# Patient Record
Sex: FEMALE | Race: WHITE | NOT HISPANIC OR LATINO
[De-identification: names, ages, dates, MRNs, and addresses within clinical notes are randomized per-mention and may not be internally consistent; named-entity substitution may affect disease eponyms.]

---

## 2017-06-13 ENCOUNTER — APPOINTMENT (OUTPATIENT)
Dept: ORTHOPEDIC SURGERY | Facility: CLINIC | Age: 62
End: 2017-06-13

## 2017-06-13 VITALS — WEIGHT: 160 LBS | BODY MASS INDEX: 25.71 KG/M2 | HEIGHT: 66 IN

## 2017-06-13 DIAGNOSIS — Z78.9 OTHER SPECIFIED HEALTH STATUS: ICD-10-CM

## 2017-06-13 DIAGNOSIS — Z80.9 FAMILY HISTORY OF MALIGNANT NEOPLASM, UNSPECIFIED: ICD-10-CM

## 2017-08-17 ENCOUNTER — APPOINTMENT (OUTPATIENT)
Dept: VASCULAR SURGERY | Facility: CLINIC | Age: 62
End: 2017-08-17
Payer: COMMERCIAL

## 2017-08-17 PROCEDURE — 93970 EXTREMITY STUDY: CPT

## 2018-01-14 ENCOUNTER — TRANSCRIPTION ENCOUNTER (OUTPATIENT)
Age: 63
End: 2018-01-14

## 2019-01-21 ENCOUNTER — RX RENEWAL (OUTPATIENT)
Age: 64
End: 2019-01-21

## 2019-01-22 ENCOUNTER — TRANSCRIPTION ENCOUNTER (OUTPATIENT)
Age: 64
End: 2019-01-22

## 2019-02-22 DIAGNOSIS — Z12.31 ENCOUNTER FOR SCREENING MAMMOGRAM FOR MALIGNANT NEOPLASM OF BREAST: ICD-10-CM

## 2019-03-08 ENCOUNTER — RECORD ABSTRACTING (OUTPATIENT)
Age: 64
End: 2019-03-08

## 2019-03-09 DIAGNOSIS — M79.652 PAIN IN LEFT THIGH: ICD-10-CM

## 2019-03-09 DIAGNOSIS — Z12.11 ENCOUNTER FOR SCREENING FOR MALIGNANT NEOPLASM OF COLON: ICD-10-CM

## 2019-03-09 DIAGNOSIS — Z87.01 PERSONAL HISTORY OF PNEUMONIA (RECURRENT): ICD-10-CM

## 2019-03-09 DIAGNOSIS — Z87.19 PERSONAL HISTORY OF OTHER DISEASES OF THE DIGESTIVE SYSTEM: ICD-10-CM

## 2019-03-09 DIAGNOSIS — Z80.1 FAMILY HISTORY OF MALIGNANT NEOPLASM OF TRACHEA, BRONCHUS AND LUNG: ICD-10-CM

## 2019-03-09 DIAGNOSIS — Z87.898 PERSONAL HISTORY OF OTHER SPECIFIED CONDITIONS: ICD-10-CM

## 2019-03-09 DIAGNOSIS — Z82.0 FAMILY HISTORY OF EPILEPSY AND OTHER DISEASES OF THE NERVOUS SYSTEM: ICD-10-CM

## 2019-03-09 DIAGNOSIS — M47.817 SPONDYLOSIS W/OUT MYELOPATHY OR RADICULOPATHY, LUMBOSACRAL REGION: ICD-10-CM

## 2019-03-09 DIAGNOSIS — H90.2 CONDUCTIVE HEARING LOSS, UNSPECIFIED: ICD-10-CM

## 2019-03-09 DIAGNOSIS — R43.9 UNSPECIFIED DISTURBANCES OF SMELL AND TASTE: ICD-10-CM

## 2019-03-09 DIAGNOSIS — M79.651 PAIN IN RIGHT THIGH: ICD-10-CM

## 2019-03-09 DIAGNOSIS — R87.610 ATYPICAL SQUAMOUS CELLS OF UNDETERMINED SIGNIFICANCE ON CYTOLOGIC SMEAR OF CERVIX (ASC-US): ICD-10-CM

## 2019-03-09 DIAGNOSIS — H93.19 TINNITUS, UNSPECIFIED EAR: ICD-10-CM

## 2019-03-09 DIAGNOSIS — M54.41 LUMBAGO WITH SCIATICA, RIGHT SIDE: ICD-10-CM

## 2019-03-09 DIAGNOSIS — Z87.440 PERSONAL HISTORY OF URINARY (TRACT) INFECTIONS: ICD-10-CM

## 2019-03-09 DIAGNOSIS — Z86.39 PERSONAL HISTORY OF OTHER ENDOCRINE, NUTRITIONAL AND METABOLIC DISEASE: ICD-10-CM

## 2019-03-09 DIAGNOSIS — Z86.79 PERSONAL HISTORY OF OTHER DISEASES OF THE CIRCULATORY SYSTEM: ICD-10-CM

## 2019-03-09 DIAGNOSIS — M75.22 BICIPITAL TENDINITIS, LEFT SHOULDER: ICD-10-CM

## 2019-03-09 DIAGNOSIS — R92.2 INCONCLUSIVE MAMMOGRAM: ICD-10-CM

## 2019-03-09 DIAGNOSIS — K76.89 OTHER SPECIFIED DISEASES OF LIVER: ICD-10-CM

## 2019-03-09 DIAGNOSIS — Q85.03 SCHWANNOMATOSIS: ICD-10-CM

## 2019-03-09 LAB — CYTOLOGY CVX/VAG DOC THIN PREP: NORMAL

## 2019-04-15 ENCOUNTER — MESSAGE (OUTPATIENT)
Age: 64
End: 2019-04-15

## 2019-04-15 RX ORDER — DOXYCYCLINE HYCLATE 100 MG/1
100 TABLET ORAL ONCE
Qty: 2 | Refills: 0 | Status: COMPLETED | COMMUNITY
Start: 2019-04-15 | End: 2019-04-16

## 2019-04-22 ENCOUNTER — RESULT REVIEW (OUTPATIENT)
Age: 64
End: 2019-04-22

## 2019-04-25 ENCOUNTER — APPOINTMENT (OUTPATIENT)
Dept: GERIATRICS | Facility: CLINIC | Age: 64
End: 2019-04-25
Payer: COMMERCIAL

## 2019-04-25 VITALS
TEMPERATURE: 98.1 F | BODY MASS INDEX: 26.31 KG/M2 | WEIGHT: 163 LBS | HEART RATE: 103 BPM | DIASTOLIC BLOOD PRESSURE: 67 MMHG | OXYGEN SATURATION: 98 % | SYSTOLIC BLOOD PRESSURE: 112 MMHG

## 2019-04-25 DIAGNOSIS — Z23 ENCOUNTER FOR IMMUNIZATION: ICD-10-CM

## 2019-04-25 PROCEDURE — XXXXX: CPT

## 2019-04-25 RX ORDER — PANTOPRAZOLE 40 MG/1
40 TABLET, DELAYED RELEASE ORAL
Refills: 0 | Status: DISCONTINUED | COMMUNITY
End: 2019-04-25

## 2019-04-25 RX ORDER — ESTRADIOL 10 UG/1
10 INSERT VAGINAL
Refills: 0 | Status: DISCONTINUED | COMMUNITY
End: 2019-04-25

## 2019-04-25 RX ORDER — NITROFURANTOIN MONOHYDRATE/MACROCRYSTALLINE 25; 75 MG/1; MG/1
100 CAPSULE ORAL
Refills: 0 | Status: DISCONTINUED | COMMUNITY
End: 2019-04-25

## 2019-04-25 NOTE — HEALTH RISK ASSESSMENT
[No falls in past year] : Patient reported no falls in the past year [0] : 1) Little interest or pleasure doing things: Not at all (0) [With Significant Other] : lives with significant other [Employed] : employed [] :  [Fully functional (bathing, dressing, toileting, transferring, walking, feeding)] : Fully functional (bathing, dressing, toileting, transferring, walking, feeding) [Fully functional (using the telephone, shopping, preparing meals, housekeeping, doing laundry, using] : Fully functional and needs no help or supervision to perform IADLs (using the telephone, shopping, preparing meals, housekeeping, doing laundry, using transportation, managing medications and managing finances) [Smoke Detector] : smoke detector [Carbon Monoxide Detector] : carbon monoxide detector [Seat Belt] :  uses seat belt [Reports changes in hearing] : Reports no changes in hearing [Reports changes in vision] : Reports no changes in vision [Reports normal functional visual acuity (ie: able to read med bottle)] : Reports poor functional visual acuity.  [Reports changes in dental health] : Reports no changes in dental health [MammogramDate] : 04/19 [MammogramComments] : waiting for results

## 2019-04-25 NOTE — HISTORY OF PRESENT ILLNESS
No [de-identified] : mammo done monday 4/22 with ultrasound\par \par colonoscopy dec 2017-clear, repeat due 7 years\par \par dr hernandez-appt may 2019\par \par feeling fatigues towards end of day\par \par pool 1x/week, walking a lot\par jose thigh pain-due to bursitis-relief with bursitis inj jose

## 2019-04-29 ENCOUNTER — MEDICATION RENEWAL (OUTPATIENT)
Age: 64
End: 2019-04-29

## 2019-05-03 ENCOUNTER — RESULT REVIEW (OUTPATIENT)
Age: 64
End: 2019-05-03

## 2019-05-24 ENCOUNTER — APPOINTMENT (OUTPATIENT)
Dept: OBGYN | Facility: CLINIC | Age: 64
End: 2019-05-24
Payer: COMMERCIAL

## 2019-05-24 VITALS
WEIGHT: 164 LBS | DIASTOLIC BLOOD PRESSURE: 68 MMHG | BODY MASS INDEX: 26.36 KG/M2 | SYSTOLIC BLOOD PRESSURE: 118 MMHG | HEIGHT: 66 IN

## 2019-05-24 PROCEDURE — 99396 PREV VISIT EST AGE 40-64: CPT

## 2019-06-02 NOTE — HISTORY OF PRESENT ILLNESS
[1 Year Ago] : 1 year ago [Healthy Diet] : a healthy diet [Good] : being in good health [Post-Menopause, No Sxs] : post-menopausal, currently without symptoms [Regular Exercise] : regular exercise

## 2019-06-21 LAB
BACTERIA UR CULT: NORMAL
CYTOLOGY CVX/VAG DOC THIN PREP: NORMAL
HPV HIGH+LOW RISK DNA PNL CVX: NOT DETECTED

## 2019-09-17 ENCOUNTER — MEDICATION RENEWAL (OUTPATIENT)
Age: 64
End: 2019-09-17

## 2019-10-30 ENCOUNTER — APPOINTMENT (OUTPATIENT)
Dept: GERIATRICS | Facility: CLINIC | Age: 64
End: 2019-10-30

## 2019-11-01 ENCOUNTER — APPOINTMENT (OUTPATIENT)
Dept: RHEUMATOLOGY | Facility: CLINIC | Age: 64
End: 2019-11-01
Payer: COMMERCIAL

## 2019-11-01 VITALS
HEIGHT: 66 IN | DIASTOLIC BLOOD PRESSURE: 70 MMHG | SYSTOLIC BLOOD PRESSURE: 120 MMHG | WEIGHT: 174 LBS | BODY MASS INDEX: 27.97 KG/M2

## 2019-11-01 PROCEDURE — 36415 COLL VENOUS BLD VENIPUNCTURE: CPT

## 2019-11-01 PROCEDURE — 99214 OFFICE O/P EST MOD 30 MIN: CPT | Mod: 25

## 2019-11-01 RX ORDER — DOXYCYCLINE HYCLATE 100 MG/1
100 CAPSULE ORAL ONCE
Qty: 2 | Refills: 0 | Status: DISCONTINUED | COMMUNITY
End: 2019-11-01

## 2019-11-06 NOTE — ASSESSMENT
[FreeTextEntry1] : There definitely is a component of fibromyalgia.  We have discussed pathophysiology and treatment paradigm at length.  Concern for PMR given pain in shoulder girdle and fatigue.  Will check markers of inflammation.  She did  have similar symptoms in the past, and took a 6 day trial of steroids for presumed PMR, without benefit.  These symptoms resolved on their own at the time long after steroids were discontinued.

## 2019-11-06 NOTE — HISTORY OF PRESENT ILLNESS
[FreeTextEntry1] : 63 yo female here for evaluation of body pain.  She reports that 2 weeks ago, she developed pain in her shoulders and neck (front and back).  Pain is continuous and interferes with her sleep. No preceding trauma or overexertion.\par She feels very tired.\par No joint pain  or swelling in hands or knees.  + stiffness in the morning.\par Last week she woke up with headaches 3 days in a row, which is not typical for her.  She took Tylenol and it went away.  She does not remember what part of her head hurt.\par No blurry vision, jaw pain/fatigue or scalp tenderness.\par She took one Aleve on Wednesday, which did not help.\par The trochanteric bursitis pain that was previously injected has not recurred.\par \par She found 3 ticks on her body this year, the last time was in May.\par no dry eyes or mouth\par no oral ulcers\par No Raynauds\par + GERD\par \par office number at work [phone]

## 2019-11-08 ENCOUNTER — MESSAGE (OUTPATIENT)
Age: 64
End: 2019-11-08

## 2019-11-14 ENCOUNTER — RX RENEWAL (OUTPATIENT)
Age: 64
End: 2019-11-14

## 2019-11-18 LAB
25(OH)D3 SERPL-MCNC: 33.1 NG/ML
ALBUMIN SERPL ELPH-MCNC: 4.3 G/DL
ALP BLD-CCNC: 81 U/L
ALT SERPL-CCNC: 17 U/L
ANA SER IF-ACNC: NEGATIVE
ANION GAP SERPL CALC-SCNC: 14 MMOL/L
AST SERPL-CCNC: 15 U/L
B BURGDOR AB SER-IMP: NEGATIVE
B BURGDOR IGM PATRN SER IB-IMP: NEGATIVE
B BURGDOR18KD IGG SER QL IB: NORMAL
B BURGDOR23KD IGG SER QL IB: NORMAL
B BURGDOR23KD IGM SER QL IB: NORMAL
B BURGDOR28KD IGG SER QL IB: NORMAL
B BURGDOR30KD IGG SER QL IB: NORMAL
B BURGDOR31KD IGG SER QL IB: NORMAL
B BURGDOR39KD IGG SER QL IB: NORMAL
B BURGDOR39KD IGM SER QL IB: NORMAL
B BURGDOR41KD IGG SER QL IB: PRESENT
B BURGDOR41KD IGM SER QL IB: PRESENT
B BURGDOR45KD IGG SER QL IB: NORMAL
B BURGDOR58KD IGG SER QL IB: NORMAL
B BURGDOR66KD IGG SER QL IB: NORMAL
B BURGDOR93KD IGG SER QL IB: NORMAL
BASOPHILS # BLD AUTO: 0.05 K/UL
BASOPHILS NFR BLD AUTO: 0.6 %
BILIRUB SERPL-MCNC: 0.2 MG/DL
BUN SERPL-MCNC: 20 MG/DL
CALCIUM SERPL-MCNC: 9.6 MG/DL
CENTROMERE IGG SER-ACNC: <0.2 CD:130001892
CHLORIDE SERPL-SCNC: 101 MMOL/L
CHROMATIN AB SERPL-ACNC: <0.2 AL
CO2 SERPL-SCNC: 26 MMOL/L
CREAT SERPL-MCNC: 0.88 MG/DL
CRP SERPL-MCNC: 0.91 MG/DL
DSDNA AB SER-ACNC: <12 IU/ML
ENA JO1 AB SER IA-ACNC: <0.2 AL
ENA RNP AB SER IA-ACNC: <0.2 AL
ENA SCL70 IGG SER IA-ACNC: <0.2 AL
ENA SM AB SER IA-ACNC: <0.2 AL
ENA SS-A AB SER IA-ACNC: <0.2 AL
ENA SS-B AB SER IA-ACNC: <0.2 AL
EOSINOPHIL # BLD AUTO: 0.06 K/UL
EOSINOPHIL NFR BLD AUTO: 0.8 %
ERYTHROCYTE [SEDIMENTATION RATE] IN BLOOD BY WESTERGREN METHOD: 49 MM/HR
GLUCOSE SERPL-MCNC: 91 MG/DL
HCT VFR BLD CALC: 41.4 %
HGB BLD-MCNC: 12.8 G/DL
IMM GRANULOCYTES NFR BLD AUTO: 0.1 %
LYMPHOCYTES # BLD AUTO: 2 K/UL
LYMPHOCYTES NFR BLD AUTO: 25.7 %
MAN DIFF?: NORMAL
MCHC RBC-ENTMCNC: 30.7 PG
MCHC RBC-ENTMCNC: 30.9 GM/DL
MCV RBC AUTO: 99.3 FL
MONOCYTES # BLD AUTO: 0.67 K/UL
MONOCYTES NFR BLD AUTO: 8.6 %
NEUTROPHILS # BLD AUTO: 5 K/UL
NEUTROPHILS NFR BLD AUTO: 64.2 %
PLATELET # BLD AUTO: 331 K/UL
POTASSIUM SERPL-SCNC: 3.9 MMOL/L
PROT SERPL-MCNC: 7 G/DL
RBC # BLD: 4.17 M/UL
RBC # FLD: 12.8 %
RNA POLYMERASE III IGG: <10 U
SODIUM SERPL-SCNC: 141 MMOL/L
VIT B12 SERPL-MCNC: 553 PG/ML
WBC # FLD AUTO: 7.79 K/UL

## 2019-11-19 ENCOUNTER — TRANSCRIPTION ENCOUNTER (OUTPATIENT)
Age: 64
End: 2019-11-19

## 2019-11-20 ENCOUNTER — APPOINTMENT (OUTPATIENT)
Dept: PODIATRY | Facility: CLINIC | Age: 64
End: 2019-11-20
Payer: COMMERCIAL

## 2019-11-20 VITALS
HEIGHT: 66 IN | DIASTOLIC BLOOD PRESSURE: 80 MMHG | WEIGHT: 174 LBS | SYSTOLIC BLOOD PRESSURE: 142 MMHG | BODY MASS INDEX: 27.97 KG/M2

## 2019-11-20 DIAGNOSIS — S92.215A NONDISPLACED FRACTURE OF CUBOID BONE OF LEFT FOOT, INITIAL ENCOUNTER FOR CLOSED FRACTURE: ICD-10-CM

## 2019-11-20 PROCEDURE — 28470 CLTX METATARSAL FX WO MNP EA: CPT

## 2019-11-20 PROCEDURE — 99203 OFFICE O/P NEW LOW 30 MIN: CPT | Mod: 25

## 2019-11-20 NOTE — PHYSICAL EXAM
[General Appearance - Alert] : alert [General Appearance - In No Acute Distress] : in no acute distress [Full Pulse] : the pedal pulses are present [Edema] : there was no peripheral edema [Deep Tendon Reflexes (DTR)] : deep tendon reflexes were 2+ and symmetric [Sensation] : the sensory exam was normal to light touch and pinprick [Oriented To Time, Place, And Person] : oriented to person, place, and time [No Focal Deficits] : no focal deficits [Impaired Insight] : insight and judgment were intact [Affect] : the affect was normal [FreeTextEntry1] : Ecchymosis and bruising noted but no break in the skin

## 2019-11-20 NOTE — PROCEDURE
[FreeTextEntry1] : X-rays from urgent care having revealed an avulsion fracture identified at the level of the cuboid no other acute pathology seen.\par had a lengthy discussion with the patient regarding the diagnosis etiology and differential diagnosis as well as treatment options for the presenting problem. Risks alternatives and benefits of treatment ranging from conservative to surgical explained in great detail. I also explained the progression of treatment from conservative to possible surgical treatment options as well as the benefits of each. I do stress conservative treatment if in fact conservative treatment is an option until it no longer provides relief. Over-the-counter products medications padding, and splinting were reviewed as well. All questions asked and answered appropriately to the patient's satisfaction\par \par I had a lengthy discussion with the patient regarding immobilization via a cam walker. After reviewing the benefits as well as the risks of being placed in a cam walker the patient has agreed. Next a new cam walker was removed from its packaging and customized to fit the patient's foot and leg and was instructed on how to use the Cam Walker. They were advised that they need to stay in the cam walker at times except showering and sleeping. I advised the patient that they should not drive with a cam walker. The Cam Walker was placed on the appropriate limb, instructions on how to ambulate with the cam walker were reviewed and the patient showed a knowledge on how to walk, remove, and reapply the cam walker. Complete discharge instructions were given as well as a follow up appointment.\par

## 2019-11-20 NOTE — REVIEW OF SYSTEMS
[As Noted in HPI] : as noted in HPI [Negative] : Cardiovascular [Leg Claudication] : no intermittent leg claudication [Lower Ext Edema] : no lower extremity edema [de-identified] : significant bruising to left foot dorsal as well as medial and lateral

## 2019-11-20 NOTE — HISTORY OF PRESENT ILLNESS
[FreeTextEntry1] : Location: lateral side of left foot4\par Duration:about a week, suffered an injury and a fall, had x-rays at  reveal avulsion fx of left cuboid\par Acute:yes\par Past Tx:[facility] and xrays\par Exacerbated by: WB\par \par

## 2019-11-21 ENCOUNTER — RESULT REVIEW (OUTPATIENT)
Age: 64
End: 2019-11-21

## 2019-12-05 ENCOUNTER — RX RENEWAL (OUTPATIENT)
Age: 64
End: 2019-12-05

## 2019-12-17 ENCOUNTER — APPOINTMENT (OUTPATIENT)
Dept: PODIATRY | Facility: CLINIC | Age: 64
End: 2019-12-17

## 2020-01-01 NOTE — END OF VISIT
[Time Spent: ___ minutes] : I have spent [unfilled] minutes of face to face time with the patient
Hyperbilirubinemia

## 2020-01-10 ENCOUNTER — APPOINTMENT (OUTPATIENT)
Dept: RHEUMATOLOGY | Facility: CLINIC | Age: 65
End: 2020-01-10
Payer: COMMERCIAL

## 2020-01-10 VITALS
BODY MASS INDEX: 28.28 KG/M2 | WEIGHT: 176 LBS | HEIGHT: 66 IN | DIASTOLIC BLOOD PRESSURE: 80 MMHG | SYSTOLIC BLOOD PRESSURE: 120 MMHG

## 2020-01-10 PROCEDURE — 36415 COLL VENOUS BLD VENIPUNCTURE: CPT

## 2020-01-10 PROCEDURE — 99213 OFFICE O/P EST LOW 20 MIN: CPT | Mod: 25

## 2020-01-22 LAB
ALBUMIN SERPL ELPH-MCNC: 4.4 G/DL
ALP BLD-CCNC: 69 U/L
ALT SERPL-CCNC: 18 U/L
ANION GAP SERPL CALC-SCNC: 14 MMOL/L
AST SERPL-CCNC: 16 U/L
BASOPHILS # BLD AUTO: 0.07 K/UL
BASOPHILS NFR BLD AUTO: 0.6 %
BILIRUB SERPL-MCNC: 0.3 MG/DL
BUN SERPL-MCNC: 19 MG/DL
CALCIUM SERPL-MCNC: 9.7 MG/DL
CHLORIDE SERPL-SCNC: 98 MMOL/L
CO2 SERPL-SCNC: 28 MMOL/L
CREAT SERPL-MCNC: 0.89 MG/DL
CRP SERPL-MCNC: 0.52 MG/DL
EOSINOPHIL # BLD AUTO: 0.06 K/UL
EOSINOPHIL NFR BLD AUTO: 0.6 %
ERYTHROCYTE [SEDIMENTATION RATE] IN BLOOD BY WESTERGREN METHOD: 47 MM/HR
GLUCOSE SERPL-MCNC: 96 MG/DL
HCT VFR BLD CALC: 41.9 %
HGB BLD-MCNC: 13.2 G/DL
IMM GRANULOCYTES NFR BLD AUTO: 0.5 %
LYMPHOCYTES # BLD AUTO: 2.8 K/UL
LYMPHOCYTES NFR BLD AUTO: 25.9 %
MAN DIFF?: NORMAL
MCHC RBC-ENTMCNC: 31 PG
MCHC RBC-ENTMCNC: 31.5 GM/DL
MCV RBC AUTO: 98.4 FL
MONOCYTES # BLD AUTO: 0.67 K/UL
MONOCYTES NFR BLD AUTO: 6.2 %
NEUTROPHILS # BLD AUTO: 7.17 K/UL
NEUTROPHILS NFR BLD AUTO: 66.2 %
PLATELET # BLD AUTO: 337 K/UL
POTASSIUM SERPL-SCNC: 4.2 MMOL/L
PROT SERPL-MCNC: 6.9 G/DL
RBC # BLD: 4.26 M/UL
RBC # FLD: 13.4 %
SODIUM SERPL-SCNC: 140 MMOL/L
WBC # FLD AUTO: 10.82 K/UL

## 2020-02-03 ENCOUNTER — APPOINTMENT (OUTPATIENT)
Dept: RHEUMATOLOGY | Facility: CLINIC | Age: 65
End: 2020-02-03

## 2020-02-21 ENCOUNTER — APPOINTMENT (OUTPATIENT)
Dept: RHEUMATOLOGY | Facility: CLINIC | Age: 65
End: 2020-02-21
Payer: COMMERCIAL

## 2020-02-21 PROCEDURE — 99214 OFFICE O/P EST MOD 30 MIN: CPT | Mod: 25

## 2020-02-21 PROCEDURE — 36415 COLL VENOUS BLD VENIPUNCTURE: CPT

## 2020-02-28 NOTE — HISTORY OF PRESENT ILLNESS
[FreeTextEntry1] : She is taking Prednisone 15 mg daily.  She feels well except cramps in her legs from the knee down.  No low back pain.\par She denies headache, blurry vision, scalp and jaw tenderness.\par

## 2020-03-06 LAB
ALBUMIN SERPL ELPH-MCNC: 4.6 G/DL
ALP BLD-CCNC: 65 U/L
ALT SERPL-CCNC: 21 U/L
ANION GAP SERPL CALC-SCNC: 14 MMOL/L
AST SERPL-CCNC: 16 U/L
BASOPHILS # BLD AUTO: 0.06 K/UL
BASOPHILS NFR BLD AUTO: 0.6 %
BILIRUB SERPL-MCNC: 0.3 MG/DL
BUN SERPL-MCNC: 17 MG/DL
CALCIUM SERPL-MCNC: 9.7 MG/DL
CHLORIDE SERPL-SCNC: 100 MMOL/L
CO2 SERPL-SCNC: 28 MMOL/L
CREAT SERPL-MCNC: 0.95 MG/DL
CRP SERPL-MCNC: 0.67 MG/DL
EOSINOPHIL # BLD AUTO: 0.09 K/UL
EOSINOPHIL NFR BLD AUTO: 0.9 %
ERYTHROCYTE [SEDIMENTATION RATE] IN BLOOD BY WESTERGREN METHOD: 31 MM/HR
FERRITIN SERPL-MCNC: 82 NG/ML
GLUCOSE SERPL-MCNC: 92 MG/DL
HCT VFR BLD CALC: 43.4 %
HGB BLD-MCNC: 13.5 G/DL
IMM GRANULOCYTES NFR BLD AUTO: 0.3 %
IRON SATN MFR SERPL: 20 %
IRON SERPL-MCNC: 62 UG/DL
LYMPHOCYTES # BLD AUTO: 3.63 K/UL
LYMPHOCYTES NFR BLD AUTO: 36 %
MAGNESIUM SERPL-MCNC: 2.4 MG/DL
MAN DIFF?: NORMAL
MCHC RBC-ENTMCNC: 31 PG
MCHC RBC-ENTMCNC: 31.1 GM/DL
MCV RBC AUTO: 99.5 FL
MONOCYTES # BLD AUTO: 0.79 K/UL
MONOCYTES NFR BLD AUTO: 7.8 %
NEUTROPHILS # BLD AUTO: 5.49 K/UL
NEUTROPHILS NFR BLD AUTO: 54.4 %
PLATELET # BLD AUTO: 349 K/UL
POTASSIUM SERPL-SCNC: 4.3 MMOL/L
PROT SERPL-MCNC: 7.2 G/DL
RBC # BLD: 4.36 M/UL
RBC # FLD: 13.5 %
SODIUM SERPL-SCNC: 142 MMOL/L
TIBC SERPL-MCNC: 307 UG/DL
UIBC SERPL-MCNC: 245 UG/DL
WBC # FLD AUTO: 10.09 K/UL

## 2020-03-13 ENCOUNTER — APPOINTMENT (OUTPATIENT)
Dept: PULMONOLOGY | Facility: CLINIC | Age: 65
End: 2020-03-13
Payer: COMMERCIAL

## 2020-03-13 ENCOUNTER — LABORATORY RESULT (OUTPATIENT)
Age: 65
End: 2020-03-13

## 2020-03-13 VITALS
HEART RATE: 88 BPM | SYSTOLIC BLOOD PRESSURE: 132 MMHG | DIASTOLIC BLOOD PRESSURE: 70 MMHG | BODY MASS INDEX: 28.28 KG/M2 | HEIGHT: 66 IN | WEIGHT: 176 LBS | OXYGEN SATURATION: 96 % | TEMPERATURE: 98.7 F

## 2020-03-13 DIAGNOSIS — J84.10 PULMONARY FIBROSIS, UNSPECIFIED: ICD-10-CM

## 2020-03-13 DIAGNOSIS — R69 ILLNESS, UNSPECIFIED: ICD-10-CM

## 2020-03-13 PROCEDURE — 99213 OFFICE O/P EST LOW 20 MIN: CPT

## 2020-03-15 PROBLEM — R69 INFLUENZA-LIKE ILLNESS: Status: ACTIVE | Noted: 2020-03-13

## 2020-03-15 PROBLEM — J84.10 PULMONARY FIBROSIS: Status: ACTIVE | Noted: 2019-03-09

## 2020-03-15 NOTE — ASSESSMENT
[FreeTextEntry1] : above was discussed at length with the patient who has an excellent understanding of the issues\par \par

## 2020-03-15 NOTE — REVIEW OF SYSTEMS
[Fever] : fever [Ear Disturbance] : ear disturbance [Menopause] : menopause [Anxiety] : anxiety [Fatigue] : no fatigue [Recent Wt Gain (___ Lbs)] : ~T no recent weight gain [EDS] : no EDS [Chills] : no chills [Poor Appetite] : no poor appetite [Recent Wt Loss (___ Lbs)] : ~T no recent weight loss [Dry Eyes] : no dry eyes [Epistaxis] : no epistaxis [Sore Throat] : no sore throat [Eye Irritation] : no eye irritation [Postnasal Drip] : no postnasal drip [Dry Mouth] : no dry mouth [Sinus Problems] : no sinus problems [Mouth Ulcers] : no mouth ulcers [Poor Dentition] : no poor dentition [Edentulous] : no edentulous [Cough] : no cough [Hemoptysis] : no hemoptysis [Chest Tightness] : no chest tightness [Frequent URIs] : no frequent URIs [Sputum] : no sputum [Dyspnea] : no dyspnea [Pleuritic Pain] : no pleuritic pain [Wheezing] : no wheezing [A.M. Dry Mouth] : no a.m. dry mouth [SOB on Exertion] : no sob on exertion [Chest Discomfort] : no chest discomfort [Claudication] : no claudication [Edema] : no edema [Leg Cramps] : no leg cramps [Orthopnea] : no orthopnea [Palpitations] : no palpitations [Phlebitis] : no phlebitis [PND] : no PND [Syncope] : no syncope [Hay Fever] : no hay fever [Watery Eyes] : no watery eyes [Itchy Eyes] : no itchy eyes [Seasonal Allergies] : no seasonal allergies [Nasal Discharge] : no nasal discharge [Hives] : no hives [Angioedema] : no angioedema [Immunocompromised] : not immunocompromised [GERD] : no gerd [Abdominal Pain] : no abdominal pain [Nausea] : no nausea [Vomiting] : no vomiting [Diarrhea] : no diarrhea [Constipation] : no constipation [Dysphagia] : no dysphagia [Bleeding] : no bleeding [Food Intolerance] : no food intolerance [Hepatic Disease] : no hepatic disease [Nocturia] : no nocturia [Dysuria] : no dysuria [Frequency] : no frequency [Urgency] : no urgency [Arthralgias] : no arthralgias [Myalgias] : no myalgias [Back Pain] : no back pain [Fracture] : no fracture [Trauma/ Injury] : no trauma/ injury [Chronic Pain] : no chronic pain [Raynaud] : no raynaud [Rash] : no rash [Ulcerations] : no ulcerations [Itch] : no itch [Telangiectasias] : no telangiectasias [Anemia] : no anemia [Blood Transfusion] : no blood transfusion [Easy Bruising] : no easy bruising [Clotting Disorder/ Frequent bleeding] : no clotting disorder/ frequent bleeding [History of Iron Deficiency] : no history of iron deficiency [Headache] : no headache [Focal Weakness] : no focal weakness [Seizures] : no seizures [Head Injury] : no head injury [Dizziness] : no dizziness [Numbness] : no numbness [Memory Loss] : no memory loss [Involuntary Movements] : no involuntary movements [Paralysis] : no paralysis [Confusion] : no confusion [Tremor] : no tremor [Depression] : no depression [Panic Attacks] : no panic attacks [Diabetes] : no diabetes [Thyroid Problem] : no thyroid problem [Obesity] : no obesity

## 2020-03-15 NOTE — REASON FOR VISIT
[Acute] : an acute visit [Abnormal CXR/ Chest CT] : an abnormal CXR/ chest CT [Cough] : cough [TextBox_44] : sweats

## 2020-03-15 NOTE — HISTORY OF PRESENT ILLNESS
[Former] : former [Never] : never [TextBox_4] : 63 yo WW former smoker 1PPD 12 pack yr hx with + DENNY PMR FM GERD Mild CT-ILD as evidenced by mild subpleural thickening seen on recent CT 11/2019 w/o change c/w 11/17 + RUL 7mm nodule s/p CT Bx'd  3 years ago c/w min int fibrosis and a gastric Schwanomma S100 '+' resected in 2/11 who has been c/o cough and waking up early AM with sweats for the last 2 days. No fevers or chills but temp has been 99.2-99.8 all day today and is now asking if she should be tested for COV-2. No SOB CP pressure and otherwise feels fine.

## 2020-03-15 NOTE — PHYSICAL EXAM
[No Acute Distress] : no acute distress [Normal Oropharynx] : normal oropharynx [Erythema] : erythema [Nasal congestion] : nasal congestion [Normal Appearance] : normal appearance [No Neck Mass] : no neck mass [Normal Rate/Rhythm] : normal rate/rhythm [Normal S1, S2] : normal s1, s2 [No Murmurs] : no murmurs [No Resp Distress] : no resp distress [No Acc Muscle Use] : no acc muscle use [Normal Rhythm and Effort] : normal rhythm and effort [No Abnormalities] : no abnormalities [Benign] : benign [Normal Gait] : normal gait [No Clubbing] : no clubbing [No Cyanosis] : no cyanosis [No Edema] : no edema [FROM] : FROM [Normal Color/ Pigmentation] : normal color/ pigmentation [No Focal Deficits] : no focal deficits [Oriented x3] : oriented x3 [Normal Affect] : normal affect

## 2020-03-15 NOTE — DISCUSSION/SUMMARY
[FreeTextEntry1] : All images and report reviewed by me in the office \par CT 11/21/19 mild subleural fibroiss and stable RUL nodule\par CT BX- results noted \par PFT 4/20/2017 FEV1 2.77 (103% predicted) FEV1/FVC 74 no change postBD % % DLCO 81% consistent with mild hyperinflation and a borderline DLCO

## 2020-03-18 ENCOUNTER — NON-APPOINTMENT (OUTPATIENT)
Age: 65
End: 2020-03-18

## 2020-04-26 ENCOUNTER — MESSAGE (OUTPATIENT)
Age: 65
End: 2020-04-26

## 2020-04-27 ENCOUNTER — RESULT REVIEW (OUTPATIENT)
Age: 65
End: 2020-04-27

## 2020-04-29 ENCOUNTER — APPOINTMENT (OUTPATIENT)
Dept: ORTHOPEDIC SURGERY | Facility: CLINIC | Age: 65
End: 2020-04-29
Payer: COMMERCIAL

## 2020-04-29 VITALS — WEIGHT: 176 LBS | BODY MASS INDEX: 28.28 KG/M2 | HEIGHT: 66 IN

## 2020-04-29 PROCEDURE — 73030 X-RAY EXAM OF SHOULDER: CPT | Mod: RT

## 2020-04-29 PROCEDURE — 99213 OFFICE O/P EST LOW 20 MIN: CPT | Mod: 25

## 2020-04-29 PROCEDURE — 20610 DRAIN/INJ JOINT/BURSA W/O US: CPT | Mod: RT

## 2020-04-29 NOTE — REVIEW OF SYSTEMS
[Joint Pain] : joint pain [Joint Stiffness] : joint stiffness [Sleep Disturbances] : ~T sleep disturbances [Muscle Weakness] : muscle weakness [Negative] : Heme/Lymph

## 2020-04-29 NOTE — PHYSICAL EXAM
[UE] : Sensory: Intact in bilateral upper extremities [Normal] : Gait: normal [Bicep] : biceps 2+ and symmetric bilaterally [Rad] : radial 2+ and symmetric bilaterally [FreeTextEntry2] : Pain on palpation\par right no\par left no\par ROM\par right 140\par left 160\par Strength\par right 4/5\par left 5/5\par Skin intact [Shoulder Instab Anterior Apprehension (Crank) Test Left] : negative Apprehension test

## 2020-04-29 NOTE — HISTORY OF PRESENT ILLNESS
[de-identified] : Patient reports moderate to severe anterior and lateral right shoulder pain for 5 months.  Patient fell on right side when walking dogs in November.  Patient had injection for right shoulder pain 3 years ago with relief.  Pain at night and clicking with motion.   PT has no effect on pain.  NSAID use helps a little with pain.

## 2020-05-05 ENCOUNTER — APPOINTMENT (OUTPATIENT)
Age: 65
End: 2020-05-05

## 2020-05-05 LAB
SARS-COV-2 IGG SERPL IA-ACNC: 0.1 INDEX
SARS-COV-2 IGG SERPL QL IA: NEGATIVE

## 2020-05-06 ENCOUNTER — APPOINTMENT (OUTPATIENT)
Dept: RHEUMATOLOGY | Facility: CLINIC | Age: 65
End: 2020-05-06
Payer: COMMERCIAL

## 2020-05-06 VITALS
SYSTOLIC BLOOD PRESSURE: 110 MMHG | WEIGHT: 176 LBS | BODY MASS INDEX: 28.28 KG/M2 | DIASTOLIC BLOOD PRESSURE: 80 MMHG | HEIGHT: 66 IN

## 2020-05-06 PROCEDURE — 20610 DRAIN/INJ JOINT/BURSA W/O US: CPT

## 2020-05-06 PROCEDURE — 99213 OFFICE O/P EST LOW 20 MIN: CPT | Mod: 25

## 2020-05-06 RX ORDER — METHYLPRED ACET/NACL,ISO-OS/PF 80 MG/ML
80 VIAL (ML) INJECTION
Qty: 1 | Refills: 0 | Status: COMPLETED | OUTPATIENT
Start: 2020-05-06

## 2020-05-06 RX ADMIN — METHYLPREDNISOLONE ACETATE 1 MG/ML: 80 INJECTION, SUSPENSION INTRA-ARTICULAR; INTRALESIONAL; INTRAMUSCULAR; SOFT TISSUE at 00:00

## 2020-05-08 NOTE — HISTORY OF PRESENT ILLNESS
[FreeTextEntry1] : She is on Prednisone 10 mg daily since mid March.  She denies headache, blurry vision, scalp and jaw tenderness.\par She is gaining weight.\par last injection of B/L trochanteric bursitis 3/14/18\par She had right shoulder injected by Dr. Casillas 4/29/20.  The previous shoulder injection was 3 years ago.

## 2020-05-08 NOTE — PROCEDURE
[FreeTextEntry1] : TROCHANTERIC BURSITIS:\par Patient placed in lateral recumbent position. Point of maximal tenderness identified. Area cleaned with alcohol.  L Trochanteric bursa injected with 1cc of Lidocaine 1% and DepoMedrol 80mg. Patient tolerated the procedure well without complications. Post-procedure instructions given.\par \par

## 2020-05-29 ENCOUNTER — APPOINTMENT (OUTPATIENT)
Dept: OBGYN | Facility: CLINIC | Age: 65
End: 2020-05-29
Payer: COMMERCIAL

## 2020-05-29 VITALS
BODY MASS INDEX: 28.93 KG/M2 | HEIGHT: 66 IN | DIASTOLIC BLOOD PRESSURE: 80 MMHG | SYSTOLIC BLOOD PRESSURE: 112 MMHG | WEIGHT: 180 LBS

## 2020-05-29 DIAGNOSIS — Z01.419 ENCOUNTER FOR GYNECOLOGICAL EXAMINATION (GENERAL) (ROUTINE) W/OUT ABNORMAL FINDINGS: ICD-10-CM

## 2020-05-29 PROCEDURE — 99396 PREV VISIT EST AGE 40-64: CPT

## 2020-06-03 ENCOUNTER — APPOINTMENT (OUTPATIENT)
Dept: RHEUMATOLOGY | Facility: CLINIC | Age: 65
End: 2020-06-03
Payer: COMMERCIAL

## 2020-06-03 PROCEDURE — 99213 OFFICE O/P EST LOW 20 MIN: CPT | Mod: 95

## 2020-06-04 NOTE — HISTORY OF PRESENT ILLNESS
[Home] : at home, [unfilled] , at the time of the visit. [Verbal consent obtained from patient] : the patient, [unfilled] [Medical Office: ([facility])___] : at the medical office located in  [FreeTextEntry1] : She is on Prednisone 8 mg for a week now.  No recurrence of symptoms as she lowers the dose.\par She had an injection of her troch bursa at last visit, which helped.\par No new symptoms.\par She denies headache, blurry vision, scalp and jaw tenderness.\par No other joint pain.\par No rashes.

## 2020-06-04 NOTE — ASSESSMENT
[FreeTextEntry1] : cont Prednisone 8 mg daily for 2 weeks, then decrease to 7 mg for 3 weeks, then f/u

## 2020-06-25 ENCOUNTER — NON-APPOINTMENT (OUTPATIENT)
Age: 65
End: 2020-06-25

## 2020-06-25 ENCOUNTER — APPOINTMENT (OUTPATIENT)
Dept: GERIATRICS | Facility: CLINIC | Age: 65
End: 2020-06-25
Payer: COMMERCIAL

## 2020-06-25 VITALS
SYSTOLIC BLOOD PRESSURE: 140 MMHG | OXYGEN SATURATION: 99 % | TEMPERATURE: 97.1 F | WEIGHT: 180 LBS | HEART RATE: 76 BPM | BODY MASS INDEX: 29.05 KG/M2 | DIASTOLIC BLOOD PRESSURE: 80 MMHG

## 2020-06-25 DIAGNOSIS — Z87.891 PERSONAL HISTORY OF NICOTINE DEPENDENCE: ICD-10-CM

## 2020-06-25 DIAGNOSIS — Z78.9 OTHER SPECIFIED HEALTH STATUS: ICD-10-CM

## 2020-06-25 PROCEDURE — 93010 ELECTROCARDIOGRAM REPORT: CPT

## 2020-06-25 PROCEDURE — 99214 OFFICE O/P EST MOD 30 MIN: CPT | Mod: 25

## 2020-06-25 PROCEDURE — 99396 PREV VISIT EST AGE 40-64: CPT | Mod: 25

## 2020-06-28 VITALS — SYSTOLIC BLOOD PRESSURE: 128 MMHG | DIASTOLIC BLOOD PRESSURE: 68 MMHG

## 2020-06-28 NOTE — HISTORY OF PRESENT ILLNESS
[de-identified] : d/w PMR around dec  started on prednisine 15 mg, has had decreasing doses and now on 7mg , doing "ok" with decreasing doses, f/u with Dr stapleton, decreasing doeses 1mg about every 3 weeks, \par \par leg cramps at night, taking magnesium with min results, trying to increase fluid intakds, foot and calf, \par \par pap smear May 2020-neg\par \par due for colonoscopy -2024\par \par ammo-april 2020-neg f/u one year\par \par Ct scan Nov 2019-neg for change in RUL nodule, Liver stable

## 2020-06-28 NOTE — REVIEW OF SYSTEMS
[Joint Pain] : joint pain [Muscle Pain] : muscle pain [Negative] : Heme/Lymph [FreeTextEntry9] : improved on steroids, cont with leg cramps at night

## 2020-06-28 NOTE — PHYSICAL EXAM
[Declined Breast Exam] : declined breast exam  [Declined Rectal Exam] : declined rectal exam [Normal] : affect was normal and insight and judgment were intact [de-identified] : slight decreased hearing right>left

## 2020-07-22 ENCOUNTER — RESULT REVIEW (OUTPATIENT)
Age: 65
End: 2020-07-22

## 2020-07-28 DIAGNOSIS — N39.0 URINARY TRACT INFECTION, SITE NOT SPECIFIED: ICD-10-CM

## 2020-08-11 ENCOUNTER — APPOINTMENT (OUTPATIENT)
Dept: RHEUMATOLOGY | Facility: CLINIC | Age: 65
End: 2020-08-11
Payer: COMMERCIAL

## 2020-08-11 VITALS
HEIGHT: 66 IN | SYSTOLIC BLOOD PRESSURE: 122 MMHG | DIASTOLIC BLOOD PRESSURE: 82 MMHG | WEIGHT: 178 LBS | BODY MASS INDEX: 28.61 KG/M2

## 2020-08-11 PROCEDURE — 99214 OFFICE O/P EST MOD 30 MIN: CPT | Mod: 25

## 2020-08-11 PROCEDURE — 20610 DRAIN/INJ JOINT/BURSA W/O US: CPT

## 2020-08-11 RX ORDER — NITROFURANTOIN (MONOHYDRATE/MACROCRYSTALS) 25; 75 MG/1; MG/1
100 CAPSULE ORAL TWICE DAILY
Qty: 10 | Refills: 0 | Status: DISCONTINUED | COMMUNITY
Start: 2020-07-28 | End: 2020-08-11

## 2020-08-11 RX ADMIN — METHYLPREDNISOLONE ACETATE 1 MG/ML: 40 INJECTION, SUSPENSION INTRA-ARTICULAR; INTRALESIONAL; INTRAMUSCULAR; SOFT TISSUE at 00:00

## 2020-08-12 RX ORDER — METHYLPRED ACET/NACL,ISO-OS/PF 40 MG/ML
40 VIAL (ML) INJECTION
Qty: 1 | Refills: 0 | Status: COMPLETED | OUTPATIENT
Start: 2020-08-11

## 2020-08-13 LAB
CYTOLOGY CVX/VAG DOC THIN PREP: NORMAL
HPV HIGH+LOW RISK DNA PNL CVX: NOT DETECTED

## 2020-08-13 NOTE — PROCEDURE
[FreeTextEntry1] : TROCHANTERIC BURSITIS:\par Patient placed in lateral recumbent position. Point of maximal tenderness identified. Area cleaned with alcohol.  L Trochanteric bursa injected with 1cc of Lidocaine 1% and DepoMedrol 40mg. Patient tolerated the procedure well without complications. Post-procedure instructions given.\par \par

## 2020-08-13 NOTE — HISTORY OF PRESENT ILLNESS
[FreeTextEntry1] : Prednisone is down to 5 mg since Aug 1st.  No flare of symptoms.\par April 29 she had a right shoulder injection by Dr. Casillas, with minimal benefit.  It hurts and clicks a lot.\par She gets frontal headaches.\par No scalp tenderness or jaw pain/fatigue.\par When she walks her dog she gets pain on the side of the her left thigh and into her groin.\par She gets leg cramps.  Dr. Prajapati recommended Tonic water.  The three nights that she didn’t drink it, the cramps came back.\par Last bursitis injection to hip was 5/6/20, which helped, but it wore off a few weeks ago.

## 2020-08-14 ENCOUNTER — RESULT REVIEW (OUTPATIENT)
Age: 65
End: 2020-08-14

## 2020-10-15 ENCOUNTER — APPOINTMENT (OUTPATIENT)
Dept: RHEUMATOLOGY | Facility: CLINIC | Age: 65
End: 2020-10-15
Payer: COMMERCIAL

## 2020-10-15 VITALS
HEIGHT: 66 IN | BODY MASS INDEX: 29.57 KG/M2 | WEIGHT: 184 LBS | DIASTOLIC BLOOD PRESSURE: 80 MMHG | SYSTOLIC BLOOD PRESSURE: 126 MMHG

## 2020-10-15 DIAGNOSIS — G89.29 OTHER CHRONIC PAIN: ICD-10-CM

## 2020-10-15 PROCEDURE — 99214 OFFICE O/P EST MOD 30 MIN: CPT

## 2020-10-18 NOTE — HISTORY OF PRESENT ILLNESS
[FreeTextEntry1] : She started getting headaches at the top of her head yesterday for 12 hours.  She woke up with it and she took Tylenol and it got better, but now it is starting to come back.  She feels like her head is in a fog.\par No blurry vision.  No scalp tenderness.  No jaw pain.\par She had "silver aura" last week in her right eye and lasted 25 minutes.  She didn't get a migraine afterwards.\par She saw Dr. Billingsley who thought her arthritis was worse than the tear and recommends shoulder replacement.  He does not do shoulder replacement, so he referred her to Dr. Franck Bran at [facility].\par Dr. Billingsley did give her an injection.  It took 2 weeks for it to kick in, and it took the edge off for a bit, but now the pain is back again and severe.\par She has pain on the side of her left side of her thigh and front of thigh.\par When she is walking her dog, she feels like she needs to sit down.\par If she lifts her leg or moves it, she has severe pain.  No pain at rest.  Standing for prolonged periods makes the pain come on.\par One day she did 22,000 steps and had no pain.

## 2020-10-18 NOTE — ASSESSMENT
[FreeTextEntry1] : She takes Aleve but her gums are bleeding and it irritates her stomach.  Recommend 2 tabs of Tylenol ES.

## 2020-10-23 ENCOUNTER — RESULT REVIEW (OUTPATIENT)
Age: 65
End: 2020-10-23

## 2020-11-05 ENCOUNTER — APPOINTMENT (OUTPATIENT)
Dept: PAIN MANAGEMENT | Facility: HOSPITAL | Age: 65
End: 2020-11-05
Payer: COMMERCIAL

## 2020-11-05 VITALS
SYSTOLIC BLOOD PRESSURE: 140 MMHG | TEMPERATURE: 97.2 F | DIASTOLIC BLOOD PRESSURE: 82 MMHG | HEIGHT: 66 IN | BODY MASS INDEX: 29.57 KG/M2 | WEIGHT: 184 LBS

## 2020-11-05 PROCEDURE — 99072 ADDL SUPL MATRL&STAF TM PHE: CPT

## 2020-11-05 PROCEDURE — 99214 OFFICE O/P EST MOD 30 MIN: CPT

## 2020-11-05 NOTE — ASSESSMENT
[FreeTextEntry1] : 65 yof presents w/ long standing low back pain that radiates down the bilateral lower extremities. Pain is worse w/ walking. Improves when sitting. Neurogenic claudication symptoms are worsening. Given her failure to improve w/ all other conservative measures and worsening radiculopathy recommend MRI lumbar spine. Patient will return to review imaging and plan for potential intervention, likely JOSE ALBERTO. \par

## 2020-11-05 NOTE — HISTORY OF PRESENT ILLNESS
[9] : 2. What number best describes how, during the past week, pain has interfered with your enjoyment of life? 9/10 pain [FreeTextEntry1] : 65 yof presents w/ long standing low back pain that radiates down the bilateral lower extremities. Pain is worse w/ walking. Improves when sitting. Has cramping in the legs at night which helps w/ tonic water and magnesium. Has a feeling of weakness and heaviness in the legs. \par \par Interventions:\par Left L4-L5 and L5-S1 TFESI 2018 [FreeTextEntry2] : 27

## 2020-11-07 ENCOUNTER — RESULT REVIEW (OUTPATIENT)
Age: 65
End: 2020-11-07

## 2020-11-09 ENCOUNTER — RESULT REVIEW (OUTPATIENT)
Age: 65
End: 2020-11-09

## 2020-11-10 ENCOUNTER — APPOINTMENT (OUTPATIENT)
Dept: PAIN MANAGEMENT | Facility: HOSPITAL | Age: 65
End: 2020-11-10

## 2020-11-10 ENCOUNTER — RESULT REVIEW (OUTPATIENT)
Age: 65
End: 2020-11-10

## 2020-12-01 ENCOUNTER — APPOINTMENT (OUTPATIENT)
Dept: RHEUMATOLOGY | Facility: CLINIC | Age: 65
End: 2020-12-01
Payer: COMMERCIAL

## 2020-12-01 VITALS
SYSTOLIC BLOOD PRESSURE: 130 MMHG | DIASTOLIC BLOOD PRESSURE: 82 MMHG | BODY MASS INDEX: 28.28 KG/M2 | HEIGHT: 66 IN | WEIGHT: 176 LBS

## 2020-12-01 DIAGNOSIS — M19.011 PRIMARY OSTEOARTHRITIS, RIGHT SHOULDER: ICD-10-CM

## 2020-12-01 DIAGNOSIS — M54.5 LOW BACK PAIN: ICD-10-CM

## 2020-12-01 DIAGNOSIS — M25.511 PAIN IN RIGHT SHOULDER: ICD-10-CM

## 2020-12-01 DIAGNOSIS — M62.89 OTHER SPECIFIED DISORDERS OF MUSCLE: ICD-10-CM

## 2020-12-01 PROCEDURE — 99072 ADDL SUPL MATRL&STAF TM PHE: CPT

## 2020-12-01 PROCEDURE — 99214 OFFICE O/P EST MOD 30 MIN: CPT | Mod: 25

## 2020-12-01 PROCEDURE — 36415 COLL VENOUS BLD VENIPUNCTURE: CPT

## 2020-12-01 RX ORDER — PREDNISONE 1 MG/1
1 TABLET ORAL
Qty: 360 | Refills: 3 | Status: COMPLETED | COMMUNITY
Start: 2020-05-06 | End: 2020-11-24

## 2020-12-01 RX ORDER — PREDNISONE 10 MG/1
10 TABLET ORAL
Refills: 0 | Status: COMPLETED | COMMUNITY
Start: 2020-01-10 | End: 2020-11-24

## 2020-12-02 ENCOUNTER — LABORATORY RESULT (OUTPATIENT)
Age: 65
End: 2020-12-02

## 2020-12-04 ENCOUNTER — RESULT REVIEW (OUTPATIENT)
Age: 65
End: 2020-12-04

## 2020-12-04 PROBLEM — M62.89 TENSOR FASCIA LATA SYNDROME: Status: ACTIVE | Noted: 2020-12-04

## 2020-12-04 PROBLEM — M25.511 RIGHT SHOULDER PAIN: Status: ACTIVE | Noted: 2017-06-13

## 2020-12-04 PROBLEM — M54.5 LEFT LOW BACK PAIN: Status: ACTIVE | Noted: 2020-10-15

## 2020-12-04 PROBLEM — M19.011 PRIMARY OSTEOARTHRITIS OF RIGHT SHOULDER: Status: ACTIVE | Noted: 2017-06-13

## 2020-12-04 NOTE — HISTORY OF PRESENT ILLNESS
[FreeTextEntry1] : She had a spine MRI, then saw Dr. Rodríguez and had an epidural, which did not help.  The pain in the leg is worse than the pain in her back.  Dr. Rodríguez proposed injecting another area that showed bad arthritic changes.\par \par She topped Prednisone on Friday.  Started feeling bad weeks ago.  Back pain is constant.  Pain is radiating down the left side of her left leg and is becoming more frequent.  Sitting down alleviates the pain.\par \par She still has constant shoulder pain.  She needs right shoulder surgery by Dr. Whiteside from [facility] but will have the surgery at [facility].  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  No blurry vision.  No jaw or scalp pain.

## 2020-12-10 ENCOUNTER — APPOINTMENT (OUTPATIENT)
Dept: PULMONOLOGY | Facility: CLINIC | Age: 65
End: 2020-12-10
Payer: COMMERCIAL

## 2020-12-10 PROCEDURE — 99214 OFFICE O/P EST MOD 30 MIN: CPT | Mod: 95

## 2020-12-10 NOTE — REASON FOR VISIT
[Abnormal CXR/ Chest CT] : an abnormal CXR/ chest CT [Acute] : an acute visit [Cough] : cough [TextBox_44] : sweats

## 2020-12-10 NOTE — HISTORY OF PRESENT ILLNESS
[Other Location: e.g. School (Enter Location, City,State)___] : at [unfilled], at the time of the visit. [Medical Office: ([facility])___] : at the medical office located in  [Spouse] : spouse [Verbal consent obtained from patient] : the patient, [unfilled] [Former] : former [Never] : never [TextBox_4] : 63 yo [place] former smoker 1PPD 12 pack yr hx with + DENNY PMR FM GERD Mild CT-ILD as evidenced by mild subpleural thickening seen on recent CT 11/2019 w/o change c/w 11/17 + RUL 7mm nodule s/p CT Bx'd  3 years ago c/w min int fibrosis and a gastric Schwanomma S100 '+' resected in 2/11 who has been c/o cough and waking up early AM with sweats for the last 2 days. No fevers or chills but temp has been 99.2-99.8 all day today and is now asking if she should be tested for COV-2. No SOB CP pressure and otherwise feels fine.

## 2020-12-10 NOTE — DISCUSSION/SUMMARY
[FreeTextEntry1] : All images and report reviewed by me in the office \par CT 11/21/19 mild subleural fibroiss and stable RUL nodule\par CT BX- results noted \par PFT 4/20/2017 FEV1 2.77 (103% predicted) FEV1/FVC 74 no change postBD % % DLCO 81% consistent with mild hyperinflation and a borderline DLCO\par Chest CT 12/4/2020 I disagree there has been interval growth in the RUL nodule within the past year.

## 2020-12-10 NOTE — ASSESSMENT
[FreeTextEntry1] : above was discussed at length with the patient who has an excellent understanding of the issues. 32min spent with 18min coordinating care\par \par

## 2020-12-13 ENCOUNTER — OUTPATIENT (OUTPATIENT)
Dept: OUTPATIENT SERVICES | Facility: HOSPITAL | Age: 65
LOS: 1 days | End: 2020-12-13
Payer: COMMERCIAL

## 2020-12-13 PROCEDURE — A9552: CPT

## 2020-12-13 PROCEDURE — 78815 PET IMAGE W/CT SKULL-THIGH: CPT

## 2020-12-13 PROCEDURE — 82962 GLUCOSE BLOOD TEST: CPT

## 2020-12-13 PROCEDURE — 78815 PET IMAGE W/CT SKULL-THIGH: CPT | Mod: 26

## 2020-12-23 PROBLEM — Z01.419 ENCOUNTER FOR ANNUAL ROUTINE GYNECOLOGICAL EXAMINATION: Status: RESOLVED | Noted: 2019-05-24 | Resolved: 2020-12-23

## 2020-12-23 PROBLEM — N39.0 ACUTE LOWER UTI: Status: RESOLVED | Noted: 2020-07-28 | Resolved: 2020-12-23

## 2021-01-07 ENCOUNTER — APPOINTMENT (OUTPATIENT)
Dept: THORACIC SURGERY | Facility: CLINIC | Age: 66
End: 2021-01-07
Payer: COMMERCIAL

## 2021-01-07 ENCOUNTER — APPOINTMENT (OUTPATIENT)
Dept: THORACIC SURGERY | Facility: CLINIC | Age: 66
End: 2021-01-07

## 2021-01-08 ENCOUNTER — APPOINTMENT (OUTPATIENT)
Dept: THORACIC SURGERY | Facility: CLINIC | Age: 66
End: 2021-01-08
Payer: COMMERCIAL

## 2021-01-08 PROCEDURE — 99204 OFFICE O/P NEW MOD 45 MIN: CPT

## 2021-01-08 PROCEDURE — 99072 ADDL SUPL MATRL&STAF TM PHE: CPT

## 2021-01-11 ENCOUNTER — NON-APPOINTMENT (OUTPATIENT)
Age: 66
End: 2021-01-11

## 2021-01-14 ENCOUNTER — RESULT REVIEW (OUTPATIENT)
Age: 66
End: 2021-01-14

## 2021-01-15 ENCOUNTER — APPOINTMENT (OUTPATIENT)
Dept: CARDIOLOGY | Facility: CLINIC | Age: 66
End: 2021-01-15
Payer: COMMERCIAL

## 2021-01-15 ENCOUNTER — NON-APPOINTMENT (OUTPATIENT)
Age: 66
End: 2021-01-15

## 2021-01-15 VITALS
WEIGHT: 176 LBS | HEIGHT: 66 IN | SYSTOLIC BLOOD PRESSURE: 170 MMHG | BODY MASS INDEX: 28.28 KG/M2 | DIASTOLIC BLOOD PRESSURE: 80 MMHG

## 2021-01-15 PROCEDURE — 93242 EXT ECG>48HR<7D RECORDING: CPT

## 2021-01-15 PROCEDURE — 99072 ADDL SUPL MATRL&STAF TM PHE: CPT

## 2021-01-15 PROCEDURE — 99204 OFFICE O/P NEW MOD 45 MIN: CPT

## 2021-01-15 PROCEDURE — 93000 ELECTROCARDIOGRAM COMPLETE: CPT | Mod: 59

## 2021-01-15 NOTE — ASSESSMENT
[FreeTextEntry1] : Referred by Dr. Paulson\par \par EKG January 2021 normal sinus rhythm with sinus variation\par \par 65-year-old ex-smoker with pulmonary nodule in for preop evaluation patient with palpitations as well as chest discomfort would recommend 2-D echocardiogram and treadmill stress test as well as outpatient cardiac monitor to assess sinus arrhythmia and PVCs burden

## 2021-01-15 NOTE — PHYSICAL EXAM
[General Appearance - Well Developed] : well developed [Normal Appearance] : normal appearance [Well Groomed] : well groomed [General Appearance - Well Nourished] : well nourished [No Deformities] : no deformities [General Appearance - In No Acute Distress] : no acute distress [Normal Conjunctiva] : the conjunctiva exhibited no abnormalities [Eyelids - No Xanthelasma] : the eyelids demonstrated no xanthelasmas [Normal Oral Mucosa] : normal oral mucosa [No Oral Pallor] : no oral pallor [No Oral Cyanosis] : no oral cyanosis [Normal Jugular Venous A Waves Present] : normal jugular venous A waves present [Normal Jugular Venous V Waves Present] : normal jugular venous V waves present [No Jugular Venous Rosa A Waves] : no jugular venous rosa A waves [Heart Rate And Rhythm] : heart rate and rhythm were normal [Heart Sounds] : normal S1 and S2 [Murmurs] : no murmurs present [Respiration, Rhythm And Depth] : normal respiratory rhythm and effort [Exaggerated Use Of Accessory Muscles For Inspiration] : no accessory muscle use [Abdomen Soft] : soft [Auscultation Breath Sounds / Voice Sounds] : lungs were clear to auscultation bilaterally [Abdomen Tenderness] : non-tender [Abdomen Mass (___ Cm)] : no abdominal mass palpated [Abnormal Walk] : normal gait [Gait - Sufficient For Exercise Testing] : the gait was sufficient for exercise testing [Nail Clubbing] : no clubbing of the fingernails [Cyanosis, Localized] : no localized cyanosis [Petechial Hemorrhages (___cm)] : no petechial hemorrhages [Skin Color & Pigmentation] : normal skin color and pigmentation [] : no rash [No Venous Stasis] : no venous stasis [Skin Lesions] : no skin lesions [No Skin Ulcers] : no skin ulcer [No Xanthoma] : no  xanthoma was observed [Oriented To Time, Place, And Person] : oriented to person, place, and time [Affect] : the affect was normal [Mood] : the mood was normal [No Anxiety] : not feeling anxious

## 2021-01-15 NOTE — HISTORY OF PRESENT ILLNESS
[FreeTextEntry1] : 65-year-old female with past medical history of intermittent hypertension and hyperlipidemia, ex-smoker and pulmonary nodule that is increasing in size is here for preop evaluation for lobectomy. Patient complains of episodes of chest pain radiating down to her left arm and numbness in her arm pit. She had a previous cardiac workup that started in 2010 which revealed sinus arrhythmia with PVCs. A 2-D echocardiogram was normal and patient also had a exercise stress test which was unremarkable at that time

## 2021-01-20 PROCEDURE — 99072 ADDL SUPL MATRL&STAF TM PHE: CPT

## 2021-01-20 PROCEDURE — 93244 EXT ECG>48HR<7D REV&INTERPJ: CPT

## 2021-01-22 ENCOUNTER — LABORATORY RESULT (OUTPATIENT)
Age: 66
End: 2021-01-22

## 2021-01-24 ENCOUNTER — RESULT REVIEW (OUTPATIENT)
Age: 66
End: 2021-01-24

## 2021-01-25 ENCOUNTER — RESULT REVIEW (OUTPATIENT)
Age: 66
End: 2021-01-25

## 2021-01-27 ENCOUNTER — RESULT CHARGE (OUTPATIENT)
Age: 66
End: 2021-01-27

## 2021-01-27 DIAGNOSIS — T50.Z95A ADVERSE EFFECT OF OTHER VACCINES AND BIOLOGICAL SUBSTANCES, INITIAL ENCOUNTER: ICD-10-CM

## 2021-01-28 ENCOUNTER — APPOINTMENT (OUTPATIENT)
Dept: GERIATRICS | Facility: CLINIC | Age: 66
End: 2021-01-28
Payer: COMMERCIAL

## 2021-01-28 VITALS
TEMPERATURE: 98.3 F | WEIGHT: 171 LBS | OXYGEN SATURATION: 97 % | HEART RATE: 83 BPM | BODY MASS INDEX: 27.6 KG/M2 | SYSTOLIC BLOOD PRESSURE: 126 MMHG | DIASTOLIC BLOOD PRESSURE: 66 MMHG

## 2021-01-28 DIAGNOSIS — Z01.818 ENCOUNTER FOR OTHER PREPROCEDURAL EXAMINATION: ICD-10-CM

## 2021-01-28 PROCEDURE — 99214 OFFICE O/P EST MOD 30 MIN: CPT

## 2021-01-28 RX ORDER — ESTRADIOL 0.1 MG/G
0.1 CREAM VAGINAL
Refills: 0 | Status: DISCONTINUED | COMMUNITY
End: 2021-01-28

## 2021-01-28 RX ORDER — MELOXICAM 7.5 MG/1
7.5 TABLET ORAL TWICE DAILY
Qty: 60 | Refills: 0 | Status: DISCONTINUED | COMMUNITY
Start: 2020-12-02 | End: 2021-01-28

## 2021-01-28 NOTE — HISTORY OF PRESENT ILLNESS
[FreeTextEntry1] : She is taking Prednisone 12.5 mg for about a month.  She notes with decreasing the dose of Prednisone, the achiness came back a little.  Nothing compared to what she had.  No headaches.  no blurry vision.  No scalp or jaw pain.\par + leg cramps

## 2021-01-29 ENCOUNTER — APPOINTMENT (OUTPATIENT)
Dept: CARDIOLOGY | Facility: CLINIC | Age: 66
End: 2021-01-29
Payer: COMMERCIAL

## 2021-01-29 PROCEDURE — 99214 OFFICE O/P EST MOD 30 MIN: CPT | Mod: 25

## 2021-01-29 PROCEDURE — 99214 OFFICE O/P EST MOD 30 MIN: CPT | Mod: 95

## 2021-01-29 NOTE — HISTORY OF PRESENT ILLNESS
[FreeTextEntry1] : 65-year-old female with past medical history of intermittent hypertension and hyperlipidemia, ex-smoker and pulmonary nodule that is increasing in size is here for preop evaluation for lobectomy. Patient complains of episodes of chest pain radiating down to her left arm and numbness in her arm pit. She had a previous cardiac workup that started in 2010 which revealed sinus arrhythmia with PVCs. A 2-D echocardiogram was normal and patient also had a exercise stress test which was unremarkable at that time\par \par Since last visit patient had outpatient cardiac monitor to assess PVC burden found to have 9 episodes of short SVT 10.2 seconds the lowest episode also found a rare PACs and PVCs patient is not on negative chronotropic medication

## 2021-01-29 NOTE — PHYSICAL EXAM
[General Appearance - Well Developed] : well developed [Normal Appearance] : normal appearance [Well Groomed] : well groomed [General Appearance - Well Nourished] : well nourished [No Deformities] : no deformities [General Appearance - In No Acute Distress] : no acute distress [Normal Conjunctiva] : the conjunctiva exhibited no abnormalities [Eyelids - No Xanthelasma] : the eyelids demonstrated no xanthelasmas [Normal Oral Mucosa] : normal oral mucosa [No Oral Pallor] : no oral pallor [No Oral Cyanosis] : no oral cyanosis [Normal Jugular Venous A Waves Present] : normal jugular venous A waves present [Normal Jugular Venous V Waves Present] : normal jugular venous V waves present [No Jugular Venous Rosa A Waves] : no jugular venous rosa A waves [Respiration, Rhythm And Depth] : normal respiratory rhythm and effort [Exaggerated Use Of Accessory Muscles For Inspiration] : no accessory muscle use [Auscultation Breath Sounds / Voice Sounds] : lungs were clear to auscultation bilaterally [Heart Rate And Rhythm] : heart rate and rhythm were normal [Murmurs] : no murmurs present [Heart Sounds] : normal S1 and S2 [Abdomen Soft] : soft [Abdomen Tenderness] : non-tender [Abdomen Mass (___ Cm)] : no abdominal mass palpated [Abnormal Walk] : normal gait [Gait - Sufficient For Exercise Testing] : the gait was sufficient for exercise testing [Nail Clubbing] : no clubbing of the fingernails [Cyanosis, Localized] : no localized cyanosis [Petechial Hemorrhages (___cm)] : no petechial hemorrhages [Skin Color & Pigmentation] : normal skin color and pigmentation [] : no rash [No Venous Stasis] : no venous stasis [Skin Lesions] : no skin lesions [No Skin Ulcers] : no skin ulcer [No Xanthoma] : no  xanthoma was observed [Oriented To Time, Place, And Person] : oriented to person, place, and time [Affect] : the affect was normal [Mood] : the mood was normal [No Anxiety] : not feeling anxious

## 2021-01-29 NOTE — ASSESSMENT
[FreeTextEntry1] : 66 y/o female, former smoker 1PPD 12 pack yr with a PMH of + DNENY ,PMR, GERD, Mild CT-ILD as evidenced by mild subpleural thickening seen on recent CT. She presents today for surgical evaluation of the right upper lobe lung nodule. Previously biopsied in 2017 revealing interstitial fibrosis.  Patient is referred by Dr. Tonny Paulson.\par \par Today the patient reports feeling generally well. Discussed any patient with a spot on lung in someone that is over the age of 40, it is concerning for malignancy.Explained that this is most likely precancerous or a non invasive cancer. A biopsy is not recommended because a negative biopsy still does not preclude malignancy. Resection recommended. Patient already received 1st COVID vaccine on 1/4/21. Has plans to undergo second vaccine in around 3 weeks. Recommend resection around 3 weeks after 2nd dose. Discussed that if hospitals become overwhelmed due to the pandemic, we will have to postpone the surgery. \par \par CT chest completed on 12/04/20: was reviewed which revealed no significant change in the right upper lobe nodule and interval increase in size as compared to 11/15/17. PET CT completed on 12/13/20: was reviewed which revealed  right upper lobe lung nodule does not exhibit increased FDG activity. It should be noted that this does not exclude malignancy.\par \par Will schedule for: Bronchoscopy RIGHT VATS, robotics assisted, RUL wedge resection, intraoperative frozen section, possible right upper lobectomy, with MLND on 2/10/21. She will need medical and cardiac clearance and PST labs. Discussed that we give a local anesthetic intraoperatively to decrease pain sensation postoperatively. The chance of needing additional treatment is around 15% . IF all lymph nodes are clean, no chemo/RT will be needed based on guidelines. \par \par The patient was counseled on the risks, benefits and alternatives of proceeding with lung resection. Specifically, the risk of bleeding, need for a blood transfusion, DVT, pulmonary embolism, pneumonia, postoperative respiratory insufficiency, postoperative ventilator support, as well as prolonged air leak, need for chest drainage, dysrhythmia, myocardial infarction, postoperative pain syndrome, need for adjuvant therapy, risk of recurrence, need for surveillance, conversion to an open procedure, as well as mortality was discussed with the patient who understands and agrees to the above. Will most likely require around 6 weeks out of work. \par \par PLAN:\par 1. Bronchoscopy RIGHT VATS, robotics assisted, RUL wedge resection, intraoperative frozen section, possible right upper lobectomy, with MLND on 2/10/21\par 2. Medical and cardiac clearance ( labs, UA, EKG)\par \par I reviewed the documentation recorded by the scribe in my presence and it accurately and completely records my words and actions\par  \par \par

## 2021-01-29 NOTE — END OF VISIT
[FreeTextEntry3] : I, KETAN MARQUEZ , am scribing for and in the presence of RAJIV GROVE the following sections: history of present illness, past medical/family/surgical/family/social history, review of systems, vital signs, physical exam, and disposition.\par \par

## 2021-01-29 NOTE — ASSESSMENT
[FreeTextEntry1] : Referred by Dr. Paulson\par \par EKG January 2021 normal sinus rhythm with sinus variation\par \par 65-year-old ex-smoker with pulmonary nodule in for preop evaluation patient \par 2-D echocardiogram  - normal \par treadmill stress test - normal \par zio svt and pac and pvc < 1 %\par \par Patient has no cardiac contraindication for planned procedure\par Patient followup post procedure to start beta blocker or calcium channel blocker for SVT\par \par Followup patient\par Reason patient request contact:\par Conference took place on video conference on [employer]\par Consent was obtained from patient\par Time spent: 25 minutes > 50% of the time in the encounter in both counseling and coordination of care for any or all of the diagnosis submitted\par \par

## 2021-01-29 NOTE — HISTORY OF PRESENT ILLNESS
[FreeTextEntry1] : 64 y/o female, former smoker 1PPD 12 pack yr with a PMH of + DENNY ,PMR, GERD, Mild CT-ILD as evidenced by mild subpleural thickening seen on recent CT. She presents today for surgical evaluation of the right upper lobe lung nodule. Patient is referred by Dr. Tonny Paulson\par \par CT chest completed on 12/04/20:\par -no significant change in the right upper lobe nodule\par -interval increase in size as compared to 11/15/17\par \par PET CT completed on 12/13/20:\par -right upper lobe lung nodule does not exhibit increased FDG activity.\par -it should be noted that this does not exclude malignancy\par \par PFT done on 12/23/20 showed FEV1 = 1.92, 98% of predicted value, FVC = 2.47, 103% of predicted value, PEF = , % of predicted value and DLCO = 26.93, 59% of predicted value.\par

## 2021-02-02 VITALS
OXYGEN SATURATION: 97 % | HEIGHT: 66 IN | RESPIRATION RATE: 18 BRPM | WEIGHT: 171 LBS | TEMPERATURE: 98.3 F | BODY MASS INDEX: 27.48 KG/M2 | DIASTOLIC BLOOD PRESSURE: 66 MMHG | HEART RATE: 83 BPM | SYSTOLIC BLOOD PRESSURE: 126 MMHG

## 2021-02-02 PROBLEM — Z01.818 PREOP EXAMINATION: Status: ACTIVE | Noted: 2021-01-15

## 2021-02-02 NOTE — REVIEW OF SYSTEMS
[Joint Pain] : joint pain [Muscle Pain] : muscle pain [Negative] : Heme/Lymph [Dysuria] : dysuria [Frequency] : frequency [FreeTextEntry9] : improved on steroids, cont with leg cramps at night

## 2021-02-02 NOTE — ASSESSMENT
[Procedure Intermediate Risk] : the procedure risk is intermediate [Patient Low Risk] : the patient is a low surgical risk [Optimized for Surgery] : the patient is optimized for surgery [As per surgery] : as per surgery [FreeTextEntry3] : no NSAID's one week prior to surgery [FreeTextEntry1] : Ms Finn was seen today in order to complete a pre-operative assessment pending a RU lobectomy on 2/10/2021 due to a growing pulmonary nodule. \par Ms Finn was found to be in her usual state of health today. She did complain of dysuria prior to the exam and was given a short course of antibiotics pending her surgery. She denies any active cardiac or pulmonary symptoms at the present time. She is able to do her normal activities without any difficulties. She had previously complained of possible chest pain with radiation and palpitations.She was seen by Dr. Scottie Gifford for cardiac evaluation. She underwent cardiac stress testing, ECHO and Holter monitoring. No significant findings were identified and patient was cleared to proceed with surgery. \par Given the above information as well as exam and review of labs today pt may proceed with her planned surgery with acceptable risk. There are no contraindications at the present time and patient is optimized.

## 2021-02-02 NOTE — PHYSICAL EXAM
[General Appearance - Alert] : alert [General Appearance - In No Acute Distress] : in no acute distress [General Appearance - Well Nourished] : well nourished [General Appearance - Well Developed] : well developed [General Appearance - Well-Appearing] : healthy appearing [Sclera] : the sclera and conjunctiva were normal [PERRL With Normal Accommodation] : pupils were equal in size, round, and reactive to light [Extraocular Movements] : extraocular movements were intact [Optic Disc Abnormality] : the optic disc were normal in size and color [Outer Ear] : the ears and nose were normal in appearance [Hearing Threshold Finger Rub Not [place]] : hearing was normal [Examination Of The Oral Cavity] : the lips and gums were normal [Both Tympanic Membranes Were Examined] : both tympanic membranes were normal [Nasal Cavity] : the nasal mucosa and septum were normal [Oropharynx] : the oropharynx was normal [Neck Appearance] : the appearance of the neck was normal [Neck Cervical Mass (___cm)] : no neck mass was observed [Jugular Venous Distention Increased] : there was no jugular-venous distention [Thyroid Diffuse Enlargement] : the thyroid was not enlarged [Thyroid Nodule] : there were no palpable thyroid nodules [Respiration, Rhythm And Depth] : normal respiratory rhythm and effort [Exaggerated Use Of Accessory Muscles For Inspiration] : no accessory muscle use [Auscultation Breath Sounds / Voice Sounds] : lungs were clear to auscultation bilaterally [Chest Palpation] : palpation of the chest revealed no abnormalities [Lungs Percussion] : the lungs were normal to percussion [Apical Impulse] : the apical impulse was normal [Heart Rate And Rhythm] : heart rate was normal and rhythm regular [Heart Sounds] : normal S1 and S2 [Heart Sounds Gallop] : no gallops [Murmurs] : no murmurs [Heart Sounds Pericardial Friction Rub] : no pericardial rub [Abdominal Aorta] : the abdominal aorta was normal [Arterial Pulses Carotid] : carotid pulses were normal with no bruits [Arterial Pulses Femoral] : femoral pulses were normal without bruits [Full Pulse] : the pedal pulses are present [Edema] : there was no peripheral edema [Bowel Sounds] : normal bowel sounds [Abdomen Soft] : soft [Abdomen Tenderness] : non-tender [Abdomen Hernia] : no hernia was discovered [Cervical Lymph Nodes Enlarged Posterior Bilaterally] : posterior cervical [Supraclavicular Lymph Nodes Enlarged Bilaterally] : supraclavicular [Axillary Lymph Nodes Enlarged Bilaterally] : axillary [No CVA Tenderness] : no ~M costovertebral angle tenderness [No Spinal Tenderness] : no spinal tenderness [Abnormal Walk] : normal gait [Nail Clubbing] : no clubbing  or cyanosis of the fingernails [Motor Tone] : muscle strength and tone were normal [Skin Color & Pigmentation] : normal skin color and pigmentation [Skin Turgor] : normal skin turgor [] : no rash [Skin Lesions] : no skin lesions [Cranial Nerves] : cranial nerves 2-12 were intact [Deep Tendon Reflexes (DTR)] : deep tendon reflexes were 2+ and symmetric [Sensation] : the sensory exam was normal to light touch and pinprick [Motor Exam] : the motor exam was normal [No Focal Deficits] : no focal deficits [Oriented To Time, Place, And Person] : oriented to person, place, and time [Impaired Insight] : insight and judgment were intact [Affect] : the affect was normal [Mood] : the mood was normal [Memory Recent] : recent memory was not impaired [Memory Remote] : remote memory was not impaired

## 2021-02-02 NOTE — HISTORY OF PRESENT ILLNESS
[Preoperative Visit] : for a medical evaluation prior to surgery [Scheduled Procedure ___] : a [unfilled] [Date of Surgery ___] : on [unfilled] [Surgeon Name ___] : surgeon: [unfilled] [Good] : Good [Stable] : Stable [Urinary Frequency] : urinary frequency [Prior Anesthesia] : Prior anesthesia [Electrocardiogram] : ~T an ECG ~C was performed [Echocardiogram] : ~T an echocardiogram ~C was performed [Cardiovascular Stress Test] : a cardiac stress test ~T ~C was performed [Fever] : no fever [Chills] : no chills [Fatigue] : no fatigue [Chest Pain] : no chest pain [Cough] : no cough [Dyspnea] : no dyspnea [Dysuria] : no dysuria [Diarrhea] : no diarrhea [Abdominal Pain] : no abdominal pain [Easy Bruising] : no easy bruising [Lower Extremity Swelling] : no lower extremity swelling [Poor Exercise Tolerance] : no poor exercise tolerance [Diabetes] : no diabetes [Cardiovascular Disease] : no cardiovascular disease [Pulmonary Disease] : no pulmonary disease [Anti-Platelet Agents] : no anti-platelet agents [Nicotine Dependence] : no nicotine dependence [Prev Anesthesia Reaction] : no previous anesthesia reaction [Anesthesia Reaction] : no anesthesia reaction [Clotting Disorder] : no clotting disorder [Sudden Death] : no sudden death [Bleeding Disorder] : no bleeding disorder [de-identified] : given course antibiotics for UTI [de-identified] : Dr. Lawrence Cortez [de-identified] : ECHO-1/15/21  Stress testing-1/25/21

## 2021-02-03 NOTE — HISTORY OF PRESENT ILLNESS
[Preoperative Visit] : for a medical evaluation prior to surgery [Date of Surgery ___] : on [unfilled] [Surgeon Name ___] : surgeon: [unfilled] [de-identified] : Dr. Lawrence Cortez

## 2021-02-07 ENCOUNTER — RESULT REVIEW (OUTPATIENT)
Age: 66
End: 2021-02-07

## 2021-02-09 ENCOUNTER — TRANSCRIPTION ENCOUNTER (OUTPATIENT)
Age: 66
End: 2021-02-09

## 2021-02-09 VITALS
HEART RATE: 82 BPM | RESPIRATION RATE: 18 BRPM | TEMPERATURE: 97 F | HEIGHT: 65.5 IN | WEIGHT: 172.84 LBS | SYSTOLIC BLOOD PRESSURE: 129 MMHG | DIASTOLIC BLOOD PRESSURE: 77 MMHG | OXYGEN SATURATION: 100 %

## 2021-02-09 NOTE — H&P ADULT - NSHPLABSRESULTS_GEN_ALL_CORE
CT chest completed on 12/04/20:  -no significant change in the right upper lobe nodule  -interval increase in size as compared to 11/15/17    PET CT completed on 12/13/20:  -right upper lobe lung nodule does not exhibit increased FDG activity.  -it should be noted that this does not exclude malignancy    PFT done on 12/23/20 showed FEV1 = 1.92, 98% of predicted value, FVC = 2.47, 103% of predicted value, PEF = , % of predicted value and DLCO = 26.93, 59% of predicted value.

## 2021-02-09 NOTE — H&P ADULT - REASON FOR ADMISSION
Bronchoscopy RIGHT VATS, robotics assisted, RUL wedge resection, intraoperative frozen section, possible right upper lobectomy, with MLND

## 2021-02-09 NOTE — H&P ADULT - ASSESSMENT
66 y/o female, former smoker 1PPD 12 pack yr, with a PMH of + DENNY ,PMR, GERD, with recent CT chest completed on 12/04/20: revealing no significant change in the right upper lobe nodule and interval increase in size as compared to 11/15/17. This nodule was previously biopsied in 2017 revealing interstitial fibrosis. PET CT completed on 12/13/20: was reviewed which revealed right upper lobe lung nodule does not exhibit increased FDG activity. It should be noted that this does not exclude malignancy. Patient is referred by Dr. Tonny Paulson.  All tests reviewed and patient deemed appropriate candidate for elective Bronchoscopy RIGHT VATS, robotics assisted, RUL wedge resection, intraoperative frozen section, possible right upper lobectomy, with MLND on 2/10/21.

## 2021-02-09 NOTE — H&P ADULT - HISTORY OF PRESENT ILLNESS
66 y/o female, former smoker 1PPD 12 pack yr, with a PMH of + DENNY ,PMR, GERD, with recent CT chest completed on 12/04/20: revealing no significant change in the right upper lobe nodule and interval increase in size as compared to 11/15/17. This nodule was previously biopsied in 2017 revealing interstitial fibrosis. PET CT completed on 12/13/20: was reviewed which revealed right upper lobe lung nodule does not exhibit increased FDG activity. It should be noted that this does not exclude malignancy. Patient is referred by Dr. Tonny Paulson.  She presents today for an elective Bronchoscopy RIGHT VATS, robotics assisted, RUL wedge resection, intraoperative frozen section, possible right upper lobectomy, with MLND on 2/10/21

## 2021-02-10 ENCOUNTER — INPATIENT (INPATIENT)
Facility: HOSPITAL | Age: 66
LOS: 1 days | Discharge: HOME | DRG: 165 | End: 2021-02-12
Attending: SPECIALIST | Admitting: SPECIALIST
Payer: COMMERCIAL

## 2021-02-10 ENCOUNTER — APPOINTMENT (OUTPATIENT)
Dept: THORACIC SURGERY | Facility: HOSPITAL | Age: 66
End: 2021-02-10

## 2021-02-10 ENCOUNTER — RESULT REVIEW (OUTPATIENT)
Age: 66
End: 2021-02-10

## 2021-02-10 DIAGNOSIS — Z98.891 HISTORY OF UTERINE SCAR FROM PREVIOUS SURGERY: Chronic | ICD-10-CM

## 2021-02-10 DIAGNOSIS — Z90.89 ACQUIRED ABSENCE OF OTHER ORGANS: Chronic | ICD-10-CM

## 2021-02-10 DIAGNOSIS — Z98.890 OTHER SPECIFIED POSTPROCEDURAL STATES: Chronic | ICD-10-CM

## 2021-02-10 LAB
ANION GAP SERPL CALC-SCNC: 10 MMOL/L — SIGNIFICANT CHANGE UP (ref 5–17)
APTT BLD: 29.5 SEC — SIGNIFICANT CHANGE UP (ref 27.5–35.5)
BASE EXCESS BLDA CALC-SCNC: -1.1 MMOL/L — SIGNIFICANT CHANGE UP (ref -2–3)
BASOPHILS # BLD AUTO: 0.02 K/UL — SIGNIFICANT CHANGE UP (ref 0–0.2)
BASOPHILS NFR BLD AUTO: 0.2 % — SIGNIFICANT CHANGE UP (ref 0–2)
BLD GP AB SCN SERPL QL: NEGATIVE — SIGNIFICANT CHANGE UP
BUN SERPL-MCNC: 15 MG/DL — SIGNIFICANT CHANGE UP (ref 7–23)
CA-I BLDA-SCNC: 0.83 MMOL/L — LOW (ref 1.12–1.3)
CALCIUM SERPL-MCNC: 8.9 MG/DL — SIGNIFICANT CHANGE UP (ref 8.4–10.5)
CHLORIDE SERPL-SCNC: 106 MMOL/L — SIGNIFICANT CHANGE UP (ref 96–108)
CO2 SERPL-SCNC: 23 MMOL/L — SIGNIFICANT CHANGE UP (ref 22–31)
COHGB MFR BLDA: 0.3 % — SIGNIFICANT CHANGE UP
CREAT SERPL-MCNC: 0.69 MG/DL — SIGNIFICANT CHANGE UP (ref 0.5–1.3)
EOSINOPHIL # BLD AUTO: 0 K/UL — SIGNIFICANT CHANGE UP (ref 0–0.5)
EOSINOPHIL NFR BLD AUTO: 0 % — SIGNIFICANT CHANGE UP (ref 0–6)
GAS PNL BLDA: SIGNIFICANT CHANGE UP
GLUCOSE BLDC GLUCOMTR-MCNC: 140 MG/DL — HIGH (ref 70–99)
GLUCOSE BLDC GLUCOMTR-MCNC: 144 MG/DL — HIGH (ref 70–99)
GLUCOSE BLDC GLUCOMTR-MCNC: 146 MG/DL — HIGH (ref 70–99)
GLUCOSE BLDC GLUCOMTR-MCNC: 99 MG/DL — SIGNIFICANT CHANGE UP (ref 70–99)
GLUCOSE SERPL-MCNC: 179 MG/DL — HIGH (ref 70–99)
HCO3 BLDA-SCNC: 22 MMOL/L — SIGNIFICANT CHANGE UP (ref 21–28)
HCT VFR BLD CALC: 35 % — SIGNIFICANT CHANGE UP (ref 34.5–45)
HGB BLD-MCNC: 11 G/DL — LOW (ref 11.5–15.5)
HGB BLDA-MCNC: 10.1 G/DL — LOW (ref 11.5–15.5)
IMM GRANULOCYTES NFR BLD AUTO: 0.6 % — SIGNIFICANT CHANGE UP (ref 0–1.5)
INR BLD: 1.11 — SIGNIFICANT CHANGE UP (ref 0.88–1.16)
LYMPHOCYTES # BLD AUTO: 0.69 K/UL — LOW (ref 1–3.3)
LYMPHOCYTES # BLD AUTO: 5.8 % — LOW (ref 13–44)
MCHC RBC-ENTMCNC: 30.6 PG — SIGNIFICANT CHANGE UP (ref 27–34)
MCHC RBC-ENTMCNC: 31.4 GM/DL — LOW (ref 32–36)
MCV RBC AUTO: 97.2 FL — SIGNIFICANT CHANGE UP (ref 80–100)
METHGB MFR BLDA: 0.2 % — SIGNIFICANT CHANGE UP
MONOCYTES # BLD AUTO: 0.14 K/UL — SIGNIFICANT CHANGE UP (ref 0–0.9)
MONOCYTES NFR BLD AUTO: 1.2 % — LOW (ref 2–14)
NEUTROPHILS # BLD AUTO: 11.05 K/UL — HIGH (ref 1.8–7.4)
NEUTROPHILS NFR BLD AUTO: 92.2 % — HIGH (ref 43–77)
NRBC # BLD: 0 /100 WBCS — SIGNIFICANT CHANGE UP (ref 0–0)
O2 CT VFR BLDA CALC: 14.8 ML/DL — SIGNIFICANT CHANGE UP (ref 15–23)
OXYHGB MFR BLDA: 99 % — SIGNIFICANT CHANGE UP (ref 94–100)
PCO2 BLDA: 30 MMHG — LOW (ref 32–45)
PH BLDA: 7.48 — HIGH (ref 7.35–7.45)
PLATELET # BLD AUTO: 282 K/UL — SIGNIFICANT CHANGE UP (ref 150–400)
PO2 BLDA: 288 MMHG — HIGH (ref 83–108)
POTASSIUM BLDA-SCNC: 3.1 MMOL/L — LOW (ref 3.5–4.9)
POTASSIUM SERPL-MCNC: 4.1 MMOL/L — SIGNIFICANT CHANGE UP (ref 3.5–5.3)
POTASSIUM SERPL-SCNC: 4.1 MMOL/L — SIGNIFICANT CHANGE UP (ref 3.5–5.3)
PROTHROM AB SERPL-ACNC: 13.2 SEC — SIGNIFICANT CHANGE UP (ref 10.6–13.6)
RBC # BLD: 3.6 M/UL — LOW (ref 3.8–5.2)
RBC # FLD: 12.5 % — SIGNIFICANT CHANGE UP (ref 10.3–14.5)
RH IG SCN BLD-IMP: POSITIVE — SIGNIFICANT CHANGE UP
SAO2 % BLDA: 100 % — SIGNIFICANT CHANGE UP (ref 95–100)
SODIUM BLDA-SCNC: 140 MMOL/L — SIGNIFICANT CHANGE UP (ref 138–146)
SODIUM SERPL-SCNC: 139 MMOL/L — SIGNIFICANT CHANGE UP (ref 135–145)
WBC # BLD: 11.97 K/UL — HIGH (ref 3.8–10.5)
WBC # FLD AUTO: 11.97 K/UL — HIGH (ref 3.8–10.5)

## 2021-02-10 PROCEDURE — 32674 THORACOSCOPY LYMPH NODE EXC: CPT | Mod: 80

## 2021-02-10 PROCEDURE — 32668 THORACOSCOPY W/W RESECT DIAG: CPT | Mod: 80

## 2021-02-10 PROCEDURE — 32674 THORACOSCOPY LYMPH NODE EXC: CPT

## 2021-02-10 PROCEDURE — 32663 THORACOSCOPY W/LOBECTOMY: CPT | Mod: 80

## 2021-02-10 PROCEDURE — 71045 X-RAY EXAM CHEST 1 VIEW: CPT | Mod: 26

## 2021-02-10 PROCEDURE — 88332 PATH CONSLTJ SURG EA ADD BLK: CPT | Mod: 26

## 2021-02-10 PROCEDURE — 32668 THORACOSCOPY W/W RESECT DIAG: CPT

## 2021-02-10 PROCEDURE — 88331 PATH CONSLTJ SURG 1 BLK 1SPC: CPT | Mod: 26

## 2021-02-10 PROCEDURE — 88309 TISSUE EXAM BY PATHOLOGIST: CPT | Mod: 26

## 2021-02-10 PROCEDURE — 32663 THORACOSCOPY W/LOBECTOMY: CPT

## 2021-02-10 PROCEDURE — 88305 TISSUE EXAM BY PATHOLOGIST: CPT | Mod: 26

## 2021-02-10 PROCEDURE — S2900 ROBOTIC SURGICAL SYSTEM: CPT | Mod: NC

## 2021-02-10 PROCEDURE — 31622 DX BRONCHOSCOPE/WASH: CPT

## 2021-02-10 RX ORDER — HEPARIN SODIUM 5000 [USP'U]/ML
5000 INJECTION INTRAVENOUS; SUBCUTANEOUS EVERY 8 HOURS
Refills: 0 | Status: DISCONTINUED | OUTPATIENT
Start: 2021-02-10 | End: 2021-02-12

## 2021-02-10 RX ORDER — BUPIVACAINE 13.3 MG/ML
20 INJECTION, SUSPENSION, LIPOSOMAL INFILTRATION ONCE
Refills: 0 | Status: DISCONTINUED | OUTPATIENT
Start: 2021-02-10 | End: 2021-02-12

## 2021-02-10 RX ORDER — DEXTROSE 50 % IN WATER 50 %
15 SYRINGE (ML) INTRAVENOUS ONCE
Refills: 0 | Status: DISCONTINUED | OUTPATIENT
Start: 2021-02-10 | End: 2021-02-12

## 2021-02-10 RX ORDER — SODIUM CHLORIDE 9 MG/ML
1000 INJECTION, SOLUTION INTRAVENOUS
Refills: 0 | Status: DISCONTINUED | OUTPATIENT
Start: 2021-02-10 | End: 2021-02-12

## 2021-02-10 RX ORDER — DEXTROSE 50 % IN WATER 50 %
25 SYRINGE (ML) INTRAVENOUS ONCE
Refills: 0 | Status: DISCONTINUED | OUTPATIENT
Start: 2021-02-10 | End: 2021-02-12

## 2021-02-10 RX ORDER — DEXTROSE 50 % IN WATER 50 %
12.5 SYRINGE (ML) INTRAVENOUS ONCE
Refills: 0 | Status: DISCONTINUED | OUTPATIENT
Start: 2021-02-10 | End: 2021-02-12

## 2021-02-10 RX ORDER — GLUCAGON INJECTION, SOLUTION 0.5 MG/.1ML
1 INJECTION, SOLUTION SUBCUTANEOUS ONCE
Refills: 0 | Status: DISCONTINUED | OUTPATIENT
Start: 2021-02-10 | End: 2021-02-12

## 2021-02-10 RX ORDER — CEFAZOLIN SODIUM 1 G
2000 VIAL (EA) INJECTION EVERY 8 HOURS
Refills: 0 | Status: COMPLETED | OUTPATIENT
Start: 2021-02-10 | End: 2021-02-11

## 2021-02-10 RX ORDER — ACETAMINOPHEN 500 MG
650 TABLET ORAL EVERY 6 HOURS
Refills: 0 | Status: DISCONTINUED | OUTPATIENT
Start: 2021-02-10 | End: 2021-02-12

## 2021-02-10 RX ORDER — FAMOTIDINE 10 MG/ML
20 INJECTION INTRAVENOUS EVERY 12 HOURS
Refills: 0 | Status: DISCONTINUED | OUTPATIENT
Start: 2021-02-10 | End: 2021-02-11

## 2021-02-10 RX ORDER — LIDOCAINE 4 G/100G
1 CREAM TOPICAL DAILY
Refills: 0 | Status: DISCONTINUED | OUTPATIENT
Start: 2021-02-10 | End: 2021-02-12

## 2021-02-10 RX ORDER — KETOROLAC TROMETHAMINE 30 MG/ML
15 SYRINGE (ML) INJECTION ONCE
Refills: 0 | Status: DISCONTINUED | OUTPATIENT
Start: 2021-02-10 | End: 2021-02-10

## 2021-02-10 RX ORDER — ESOMEPRAZOLE MAGNESIUM 40 MG/1
1 CAPSULE, DELAYED RELEASE ORAL
Qty: 0 | Refills: 0 | DISCHARGE

## 2021-02-10 RX ORDER — ESOMEPRAZOLE MAGNESIUM 40 MG/1
100 CAPSULE, DELAYED RELEASE ORAL
Qty: 0 | Refills: 0 | DISCHARGE

## 2021-02-10 RX ORDER — ACETAMINOPHEN 500 MG
1000 TABLET ORAL ONCE
Refills: 0 | Status: COMPLETED | OUTPATIENT
Start: 2021-02-10 | End: 2021-02-10

## 2021-02-10 RX ORDER — INSULIN LISPRO 100/ML
VIAL (ML) SUBCUTANEOUS
Refills: 0 | Status: DISCONTINUED | OUTPATIENT
Start: 2021-02-10 | End: 2021-02-12

## 2021-02-10 RX ADMIN — HEPARIN SODIUM 5000 UNIT(S): 5000 INJECTION INTRAVENOUS; SUBCUTANEOUS at 21:32

## 2021-02-10 RX ADMIN — Medication 100 MILLIGRAM(S): at 17:11

## 2021-02-10 RX ADMIN — HEPARIN SODIUM 5000 UNIT(S): 5000 INJECTION INTRAVENOUS; SUBCUTANEOUS at 15:42

## 2021-02-10 RX ADMIN — Medication 15 MILLIGRAM(S): at 21:32

## 2021-02-10 RX ADMIN — FAMOTIDINE 20 MILLIGRAM(S): 10 INJECTION INTRAVENOUS at 17:12

## 2021-02-10 RX ADMIN — LIDOCAINE 1 PATCH: 4 CREAM TOPICAL at 13:56

## 2021-02-10 RX ADMIN — Medication 400 MILLIGRAM(S): at 15:40

## 2021-02-10 RX ADMIN — LIDOCAINE 1 PATCH: 4 CREAM TOPICAL at 19:24

## 2021-02-10 NOTE — BRIEF OPERATIVE NOTE - COMMENTS
Dr. Soto was the first assistant for this case including but not limited to robotic-assisted surgery and right upper lobectomy     I was present for this procedure and participated as first assistant as described by the PA above, unless otherwise noted below.

## 2021-02-11 LAB
A1C WITH ESTIMATED AVERAGE GLUCOSE RESULT: 5.6 % — SIGNIFICANT CHANGE UP (ref 4–5.6)
ANION GAP SERPL CALC-SCNC: 10 MMOL/L — SIGNIFICANT CHANGE UP (ref 5–17)
BASOPHILS # BLD AUTO: 0.02 K/UL — SIGNIFICANT CHANGE UP (ref 0–0.2)
BASOPHILS NFR BLD AUTO: 0.2 % — SIGNIFICANT CHANGE UP (ref 0–2)
BUN SERPL-MCNC: 15 MG/DL — SIGNIFICANT CHANGE UP (ref 7–23)
CALCIUM SERPL-MCNC: 8.5 MG/DL — SIGNIFICANT CHANGE UP (ref 8.4–10.5)
CHLORIDE SERPL-SCNC: 102 MMOL/L — SIGNIFICANT CHANGE UP (ref 96–108)
CO2 SERPL-SCNC: 25 MMOL/L — SIGNIFICANT CHANGE UP (ref 22–31)
CREAT SERPL-MCNC: 0.82 MG/DL — SIGNIFICANT CHANGE UP (ref 0.5–1.3)
EOSINOPHIL # BLD AUTO: 0 K/UL — SIGNIFICANT CHANGE UP (ref 0–0.5)
EOSINOPHIL NFR BLD AUTO: 0 % — SIGNIFICANT CHANGE UP (ref 0–6)
ESTIMATED AVERAGE GLUCOSE: 114 MG/DL — SIGNIFICANT CHANGE UP (ref 68–114)
GLUCOSE BLDC GLUCOMTR-MCNC: 105 MG/DL — HIGH (ref 70–99)
GLUCOSE BLDC GLUCOMTR-MCNC: 112 MG/DL — HIGH (ref 70–99)
GLUCOSE SERPL-MCNC: 100 MG/DL — HIGH (ref 70–99)
HCT VFR BLD CALC: 32.9 % — LOW (ref 34.5–45)
HGB BLD-MCNC: 10.2 G/DL — LOW (ref 11.5–15.5)
IMM GRANULOCYTES NFR BLD AUTO: 0.9 % — SIGNIFICANT CHANGE UP (ref 0–1.5)
LYMPHOCYTES # BLD AUTO: 1.42 K/UL — SIGNIFICANT CHANGE UP (ref 1–3.3)
LYMPHOCYTES # BLD AUTO: 11.6 % — LOW (ref 13–44)
MAGNESIUM SERPL-MCNC: 1.8 MG/DL — SIGNIFICANT CHANGE UP (ref 1.6–2.6)
MCHC RBC-ENTMCNC: 30.2 PG — SIGNIFICANT CHANGE UP (ref 27–34)
MCHC RBC-ENTMCNC: 31 GM/DL — LOW (ref 32–36)
MCV RBC AUTO: 97.3 FL — SIGNIFICANT CHANGE UP (ref 80–100)
MONOCYTES # BLD AUTO: 1.18 K/UL — HIGH (ref 0–0.9)
MONOCYTES NFR BLD AUTO: 9.6 % — SIGNIFICANT CHANGE UP (ref 2–14)
NEUTROPHILS # BLD AUTO: 9.55 K/UL — HIGH (ref 1.8–7.4)
NEUTROPHILS NFR BLD AUTO: 77.7 % — HIGH (ref 43–77)
NRBC # BLD: 0 /100 WBCS — SIGNIFICANT CHANGE UP (ref 0–0)
PLATELET # BLD AUTO: 273 K/UL — SIGNIFICANT CHANGE UP (ref 150–400)
POTASSIUM SERPL-MCNC: 3.7 MMOL/L — SIGNIFICANT CHANGE UP (ref 3.5–5.3)
POTASSIUM SERPL-SCNC: 3.7 MMOL/L — SIGNIFICANT CHANGE UP (ref 3.5–5.3)
RBC # BLD: 3.38 M/UL — LOW (ref 3.8–5.2)
RBC # FLD: 12.7 % — SIGNIFICANT CHANGE UP (ref 10.3–14.5)
SODIUM SERPL-SCNC: 137 MMOL/L — SIGNIFICANT CHANGE UP (ref 135–145)
WBC # BLD: 12.28 K/UL — HIGH (ref 3.8–10.5)
WBC # FLD AUTO: 12.28 K/UL — HIGH (ref 3.8–10.5)

## 2021-02-11 PROCEDURE — 71045 X-RAY EXAM CHEST 1 VIEW: CPT | Mod: 26

## 2021-02-11 RX ORDER — PANTOPRAZOLE SODIUM 20 MG/1
40 TABLET, DELAYED RELEASE ORAL
Refills: 0 | Status: DISCONTINUED | OUTPATIENT
Start: 2021-02-11 | End: 2021-02-12

## 2021-02-11 RX ORDER — MAGNESIUM OXIDE 400 MG ORAL TABLET 241.3 MG
800 TABLET ORAL ONCE
Refills: 0 | Status: COMPLETED | OUTPATIENT
Start: 2021-02-11 | End: 2021-02-11

## 2021-02-11 RX ORDER — SODIUM CHLORIDE 9 MG/ML
500 INJECTION, SOLUTION INTRAVENOUS ONCE
Refills: 0 | Status: COMPLETED | OUTPATIENT
Start: 2021-02-11 | End: 2021-02-11

## 2021-02-11 RX ORDER — KETOROLAC TROMETHAMINE 30 MG/ML
15 SYRINGE (ML) INJECTION ONCE
Refills: 0 | Status: DISCONTINUED | OUTPATIENT
Start: 2021-02-11 | End: 2021-02-11

## 2021-02-11 RX ORDER — SIMETHICONE 80 MG/1
80 TABLET, CHEWABLE ORAL ONCE
Refills: 0 | Status: COMPLETED | OUTPATIENT
Start: 2021-02-11 | End: 2021-02-11

## 2021-02-11 RX ORDER — POTASSIUM CHLORIDE 20 MEQ
40 PACKET (EA) ORAL ONCE
Refills: 0 | Status: COMPLETED | OUTPATIENT
Start: 2021-02-11 | End: 2021-02-11

## 2021-02-11 RX ADMIN — LIDOCAINE 1 PATCH: 4 CREAM TOPICAL at 13:13

## 2021-02-11 RX ADMIN — HEPARIN SODIUM 5000 UNIT(S): 5000 INJECTION INTRAVENOUS; SUBCUTANEOUS at 06:50

## 2021-02-11 RX ADMIN — HEPARIN SODIUM 5000 UNIT(S): 5000 INJECTION INTRAVENOUS; SUBCUTANEOUS at 12:58

## 2021-02-11 RX ADMIN — SIMETHICONE 80 MILLIGRAM(S): 80 TABLET, CHEWABLE ORAL at 08:12

## 2021-02-11 RX ADMIN — Medication 15 MILLIGRAM(S): at 08:52

## 2021-02-11 RX ADMIN — Medication 100 MILLIGRAM(S): at 08:53

## 2021-02-11 RX ADMIN — LIDOCAINE 1 PATCH: 4 CREAM TOPICAL at 01:10

## 2021-02-11 RX ADMIN — MAGNESIUM OXIDE 400 MG ORAL TABLET 800 MILLIGRAM(S): 241.3 TABLET ORAL at 12:58

## 2021-02-11 RX ADMIN — LIDOCAINE 1 PATCH: 4 CREAM TOPICAL at 18:19

## 2021-02-11 RX ADMIN — Medication 40 MILLIEQUIVALENT(S): at 12:59

## 2021-02-11 RX ADMIN — FAMOTIDINE 20 MILLIGRAM(S): 10 INJECTION INTRAVENOUS at 06:50

## 2021-02-11 RX ADMIN — Medication 650 MILLIGRAM(S): at 19:45

## 2021-02-11 RX ADMIN — Medication 650 MILLIGRAM(S): at 12:59

## 2021-02-11 RX ADMIN — SODIUM CHLORIDE 500 MILLILITER(S): 9 INJECTION, SOLUTION INTRAVENOUS at 08:52

## 2021-02-11 RX ADMIN — Medication 100 MILLIGRAM(S): at 01:08

## 2021-02-11 RX ADMIN — Medication 650 MILLIGRAM(S): at 07:03

## 2021-02-11 RX ADMIN — HEPARIN SODIUM 5000 UNIT(S): 5000 INJECTION INTRAVENOUS; SUBCUTANEOUS at 21:55

## 2021-02-11 NOTE — PHYSICAL THERAPY INITIAL EVALUATION ADULT - ADDITIONAL COMMENTS
Patient lives alone in private house with no steps to enter, nor inside. Does not use any Assistive Devices at baseline. Denies recent Hx of falls.

## 2021-02-11 NOTE — PHYSICAL THERAPY INITIAL EVALUATION ADULT - GENERAL OBSERVATIONS, REHAB EVAL
Patient received semi-lombardi in bed  in NAD on RA, +SCDs, +Telemetry, +PIV. Cleared by AMIE Hernandez. Agreeable to PT.

## 2021-02-11 NOTE — PHYSICAL THERAPY INITIAL EVALUATION ADULT - RANGE OF MOTION EXAMINATION, REHAB EVAL
Except for Shoulder Fl/Abd due to pain (anticipating right shoulder replacement)/no ROM deficits were identified

## 2021-02-11 NOTE — PROGRESS NOTE ADULT - ASSESSMENT
A/P:    64 y/o female, former smoker, with a PMHx of + DENNY , GERD, with recent CT chest completed on 12/04/20 interval increase in size of right upper lobe nodule. This nodule was previously biopsied in 2017 which revealed interstitial fibrosis. PET CT completed 12/13/20 revealed right upper lobe lung nodule, not FDG avid. She was referred to Dr. Cortez by Dr. Tonny Paulson for surgical evaluation and was deemed a good surgical candidate. On 2/10/21 she underwent an uncomplicated right VATS, RA RULx, MLND. Post operatively she was brought to the PACU with 1 CT in place, on suction, no air leak. POD1 CT removed, f/u CXR stable.    Neurovascular:   - No delirium. Pain well controlled with current regimen.  -Continue tylenol PRN    Cardiovascular:   -Hemodynamically stable. HR controlled  - Hx of HTN, BP controlled    Respiratory:   - POD1 s/p R VATS, RA RULx, MLND, path pending  - CT removed today, CXR stable   - 02 Sat = 98% on RA  -Encourage C+DB and Use of IS 10x / hr while awake.  -F/u AM CXR     GI:   - Stable.  -Continue GI PPX with protonix  -PO Diet.    Renal / :  - BUN/Cr stable at 11/0.82  -Continue to monitor renal function.  -Monitor I/O's.  - Replete electrolytes PRN    Endocrine:    - No known hx diabetes or thyroid disease     Hematologic:  -H/H stable at 10.2/32.9 with no obvious signs of bleeding  -Coagulation Panel.    ID:  -Pt remains afebrile with no elevation in WBC or signs of infection  -Continue to monitor CBC  -Observe for SIRS/Sepsis Syndrome.    Prophylaxis:  -DVT prophylaxis with 5000 SubQ Heparin q8h.  -SCD's    Disposition:  -Home when medically appropriate pending CT management   
A/P:    64 y/o female, former smoker, with a PMHx of + DENNY , GERD, with recent CT chest completed on 12/04/20 interval increase in size of right upper lobe nodule. This nodule was previously biopsied in 2017 which revealed interstitial fibrosis. PET CT completed 12/13/20 revealed right upper lobe lung nodule, not FDG avid. She was referred to Dr. Cortez by Dr. Tonny Paulson for surgical evaluation and was deemed a good surgical candidate. On 2/10/21 she underwent an uncomplicated right VATS, RA RULx, MLND. Post operatively she was brought to the PACU with 1 CT in place, on suction, no air leak.   Neurovascular:   - No delirium. Pain well controlled with current regimen.  -Continue tylenol PRN    Cardiovascular:   -Hemodynamically stable. HR controlled  - Hx of HTN, BP controlled    Respiratory:   - POD0 s/p R VATS, RA RULx, MLND, path pending  - 1CT in place, on suction, no air leak   - 02 Sat = 98% on 2L NC.  -If on oxygen wean to RA from for O2 Sat > 93%.  -Encourage C+DB and Use of IS 10x / hr while awake.  -F/u AM CXR     GI:   - Stable.  -Continue GI PPX with protonix  -PO Diet.    Renal / :  - BUN/Cr stable at 15/0.69  -Continue to monitor renal function.  -Monitor I/O's.  - Replete electrolytes PRN    Endocrine:    - No known hx diabetes or thyroid disease     Hematologic:  -H/H stable at 11.0/35.0 with no obvious signs of bleeding  -Coagulation Panel.    ID:  -Pt remains afebrile with no elevation in WBC or signs of infection  -Continue to monitor CBC  -Observe for SIRS/Sepsis Syndrome.    Prophylaxis:  -DVT prophylaxis with 5000 SubQ Heparin q8h.  -SCD's    Disposition:  -Home when medically appropriate pending CT management

## 2021-02-11 NOTE — PHYSICAL THERAPY INITIAL EVALUATION ADULT - DIAGNOSIS, PT EVAL
6C: impaired ventilation, respiration/gas exchange and aerobic capacity/endurance associated with airway clearance dysfunction

## 2021-02-11 NOTE — PHYSICAL THERAPY INITIAL EVALUATION ADULT - PERTINENT HX OF CURRENT PROBLEM, REHAB EVAL
66 y/o female, former smoker, with a PMHx of + DENNY , GERD, with recent CT chest completed on 12/04/20 interval increase in size of right upper lobe nodule. This nodule was previously biopsied in 2017 which revealed interstitial fibrosis. Now s/p right VATS, RA RULx,

## 2021-02-11 NOTE — CHART NOTE - NSCHARTNOTEFT_GEN_A_CORE
CT Removal:    Pt seen and examined at bedside.  Case discussed with Dr. Cortez.  Minimal output from CTs.  No air leak appreciated.  CT removed without incident per Dr. Cortez request.  Occlusive DSD placed.  CXR no obvious PTX noted.  Pt tolerated procedure well.

## 2021-02-12 ENCOUNTER — TRANSCRIPTION ENCOUNTER (OUTPATIENT)
Age: 66
End: 2021-02-12

## 2021-02-12 VITALS
HEART RATE: 92 BPM | OXYGEN SATURATION: 97 % | DIASTOLIC BLOOD PRESSURE: 66 MMHG | RESPIRATION RATE: 16 BRPM | SYSTOLIC BLOOD PRESSURE: 132 MMHG

## 2021-02-12 PROBLEM — Q85.03 SCHWANNOMATOSIS: Chronic | Status: ACTIVE | Noted: 2021-02-10

## 2021-02-12 PROBLEM — M79.7 FIBROMYALGIA: Chronic | Status: ACTIVE | Noted: 2021-02-09

## 2021-02-12 PROBLEM — R91.8 OTHER NONSPECIFIC ABNORMAL FINDING OF LUNG FIELD: Chronic | Status: ACTIVE | Noted: 2021-02-10

## 2021-02-12 PROBLEM — M54.5 LOW BACK PAIN: Chronic | Status: ACTIVE | Noted: 2021-02-09

## 2021-02-12 PROBLEM — K21.9 GASTRO-ESOPHAGEAL REFLUX DISEASE WITHOUT ESOPHAGITIS: Chronic | Status: ACTIVE | Noted: 2021-02-09

## 2021-02-12 LAB
ANION GAP SERPL CALC-SCNC: 6 MMOL/L — SIGNIFICANT CHANGE UP (ref 5–17)
BUN SERPL-MCNC: 17 MG/DL — SIGNIFICANT CHANGE UP (ref 7–23)
CALCIUM SERPL-MCNC: 8.7 MG/DL — SIGNIFICANT CHANGE UP (ref 8.4–10.5)
CHLORIDE SERPL-SCNC: 105 MMOL/L — SIGNIFICANT CHANGE UP (ref 96–108)
CO2 SERPL-SCNC: 28 MMOL/L — SIGNIFICANT CHANGE UP (ref 22–31)
CREAT SERPL-MCNC: 0.81 MG/DL — SIGNIFICANT CHANGE UP (ref 0.5–1.3)
GLUCOSE BLDC GLUCOMTR-MCNC: 93 MG/DL — SIGNIFICANT CHANGE UP (ref 70–99)
GLUCOSE BLDC GLUCOMTR-MCNC: 97 MG/DL — SIGNIFICANT CHANGE UP (ref 70–99)
GLUCOSE SERPL-MCNC: 98 MG/DL — SIGNIFICANT CHANGE UP (ref 70–99)
HCT VFR BLD CALC: 32.7 % — LOW (ref 34.5–45)
HGB BLD-MCNC: 10 G/DL — LOW (ref 11.5–15.5)
MAGNESIUM SERPL-MCNC: 2.1 MG/DL — SIGNIFICANT CHANGE UP (ref 1.6–2.6)
MCHC RBC-ENTMCNC: 30.5 PG — SIGNIFICANT CHANGE UP (ref 27–34)
MCHC RBC-ENTMCNC: 30.6 GM/DL — LOW (ref 32–36)
MCV RBC AUTO: 99.7 FL — SIGNIFICANT CHANGE UP (ref 80–100)
NRBC # BLD: 0 /100 WBCS — SIGNIFICANT CHANGE UP (ref 0–0)
PLATELET # BLD AUTO: 239 K/UL — SIGNIFICANT CHANGE UP (ref 150–400)
POTASSIUM SERPL-MCNC: 4.7 MMOL/L — SIGNIFICANT CHANGE UP (ref 3.5–5.3)
POTASSIUM SERPL-SCNC: 4.7 MMOL/L — SIGNIFICANT CHANGE UP (ref 3.5–5.3)
RBC # BLD: 3.28 M/UL — LOW (ref 3.8–5.2)
RBC # FLD: 12.6 % — SIGNIFICANT CHANGE UP (ref 10.3–14.5)
SODIUM SERPL-SCNC: 139 MMOL/L — SIGNIFICANT CHANGE UP (ref 135–145)
WBC # BLD: 8.02 K/UL — SIGNIFICANT CHANGE UP (ref 3.8–10.5)
WBC # FLD AUTO: 8.02 K/UL — SIGNIFICANT CHANGE UP (ref 3.8–10.5)

## 2021-02-12 PROCEDURE — 85027 COMPLETE CBC AUTOMATED: CPT

## 2021-02-12 PROCEDURE — 85730 THROMBOPLASTIN TIME PARTIAL: CPT

## 2021-02-12 PROCEDURE — 84132 ASSAY OF SERUM POTASSIUM: CPT

## 2021-02-12 PROCEDURE — 82962 GLUCOSE BLOOD TEST: CPT

## 2021-02-12 PROCEDURE — 84295 ASSAY OF SERUM SODIUM: CPT

## 2021-02-12 PROCEDURE — 86900 BLOOD TYPING SEROLOGIC ABO: CPT

## 2021-02-12 PROCEDURE — 86901 BLOOD TYPING SEROLOGIC RH(D): CPT

## 2021-02-12 PROCEDURE — 83036 HEMOGLOBIN GLYCOSYLATED A1C: CPT

## 2021-02-12 PROCEDURE — 88305 TISSUE EXAM BY PATHOLOGIST: CPT

## 2021-02-12 PROCEDURE — 82330 ASSAY OF CALCIUM: CPT

## 2021-02-12 PROCEDURE — 85610 PROTHROMBIN TIME: CPT

## 2021-02-12 PROCEDURE — 71045 X-RAY EXAM CHEST 1 VIEW: CPT

## 2021-02-12 PROCEDURE — 80048 BASIC METABOLIC PNL TOTAL CA: CPT

## 2021-02-12 PROCEDURE — 71045 X-RAY EXAM CHEST 1 VIEW: CPT | Mod: 26

## 2021-02-12 PROCEDURE — 86850 RBC ANTIBODY SCREEN: CPT

## 2021-02-12 PROCEDURE — S2900: CPT

## 2021-02-12 PROCEDURE — C1889: CPT

## 2021-02-12 PROCEDURE — 88332 PATH CONSLTJ SURG EA ADD BLK: CPT

## 2021-02-12 PROCEDURE — 71045 X-RAY EXAM CHEST 1 VIEW: CPT | Mod: 26,77

## 2021-02-12 PROCEDURE — C9399: CPT

## 2021-02-12 PROCEDURE — 85025 COMPLETE CBC W/AUTO DIFF WBC: CPT

## 2021-02-12 PROCEDURE — 88331 PATH CONSLTJ SURG 1 BLK 1SPC: CPT

## 2021-02-12 PROCEDURE — 36415 COLL VENOUS BLD VENIPUNCTURE: CPT

## 2021-02-12 PROCEDURE — 85018 HEMOGLOBIN: CPT

## 2021-02-12 PROCEDURE — 83735 ASSAY OF MAGNESIUM: CPT

## 2021-02-12 PROCEDURE — 88309 TISSUE EXAM BY PATHOLOGIST: CPT

## 2021-02-12 RX ORDER — ACETAMINOPHEN 500 MG
2 TABLET ORAL
Qty: 56 | Refills: 0
Start: 2021-02-12 | End: 2021-02-18

## 2021-02-12 RX ORDER — ACETAMINOPHEN 500 MG
1000 TABLET ORAL ONCE
Refills: 0 | Status: COMPLETED | OUTPATIENT
Start: 2021-02-12 | End: 2021-02-12

## 2021-02-12 RX ORDER — KETOROLAC TROMETHAMINE 30 MG/ML
30 SYRINGE (ML) INJECTION ONCE
Refills: 0 | Status: DISCONTINUED | OUTPATIENT
Start: 2021-02-12 | End: 2021-02-12

## 2021-02-12 RX ORDER — ACETAMINOPHEN 500 MG
2 TABLET ORAL
Qty: 0 | Refills: 0 | DISCHARGE

## 2021-02-12 RX ADMIN — LIDOCAINE 1 PATCH: 4 CREAM TOPICAL at 00:17

## 2021-02-12 RX ADMIN — Medication 30 MILLIGRAM(S): at 07:38

## 2021-02-12 RX ADMIN — PANTOPRAZOLE SODIUM 40 MILLIGRAM(S): 20 TABLET, DELAYED RELEASE ORAL at 05:54

## 2021-02-12 RX ADMIN — Medication 650 MILLIGRAM(S): at 04:06

## 2021-02-12 RX ADMIN — HEPARIN SODIUM 5000 UNIT(S): 5000 INJECTION INTRAVENOUS; SUBCUTANEOUS at 05:54

## 2021-02-12 RX ADMIN — LIDOCAINE 1 PATCH: 4 CREAM TOPICAL at 11:57

## 2021-02-12 RX ADMIN — Medication 400 MILLIGRAM(S): at 11:05

## 2021-02-12 RX ADMIN — HEPARIN SODIUM 5000 UNIT(S): 5000 INJECTION INTRAVENOUS; SUBCUTANEOUS at 15:15

## 2021-02-12 NOTE — DISCHARGE NOTE PROVIDER - CARE PROVIDER_API CALL
Lawrence Cortez)  Surgery; Thoracic Surgery  [address] [place] [address]  Phone: [phone]  Fax: [phone]  Scheduled Appointment: 02/18/2021 11:00 AM    Tonny Paulson)  Critical Care Medicine; Pulmonary Disease; Sleep Medicine  [address] [place] [address], [address]  [place] [address]  Phone: [phone]  Fax: [phone]  Follow Up Time:

## 2021-02-12 NOTE — DISCHARGE NOTE PROVIDER - PROVIDER TOKENS
PROVIDER:[TOKEN:[[id number]],SCHEDULEDAPPT:[02/18/2021],SCHEDULEDAPPTTIME:[11:00 AM]],PROVIDER:[TOKEN:[[id number]]]

## 2021-02-12 NOTE — DISCHARGE NOTE PROVIDER - NSDCFUSCHEDAPPT_GEN_ALL_CORE_FT
KAYA GANN ; 02/18/2021 ; [facility] [address]  KAYA GANN ; 02/25/2021 ; [facility] Card[facility] [address]  KAYA GANN ; 03/03/2021 ; [facility] Rhe[facility] [address]

## 2021-02-12 NOTE — DISCHARGE NOTE PROVIDER - NSDCMRMEDTOKEN_GEN_ALL_CORE_FT
NexIUM 40 mg oral delayed release capsule: 1 cap(s) orally once a day  Tylenol 500 mg oral tablet: 2 tab(s) orally every 6 hours, As Needed   NexIUM 40 mg oral delayed release capsule: 1 cap(s) orally once a day  Tylenol Extra Strength 500 mg oral tablet: 2 tab(s) orally every 6 hours, As Needed

## 2021-02-12 NOTE — DISCHARGE NOTE PROVIDER - NSDCCPCAREPLAN_GEN_ALL_CORE_FT
PRINCIPAL DISCHARGE DIAGNOSIS  Diagnosis: Nodule of right lung  Assessment and Plan of Treatment:

## 2021-02-12 NOTE — DISCHARGE NOTE PROVIDER - HOSPITAL COURSE
66 y/o female, former smoker, with a PMHx of + DENNY , GERD, with recent CT chest completed on 12/04/20 interval increase in size of right upper lobe nodule. This nodule was previously biopsied in 2017 which revealed interstitial fibrosis. PET CT completed 12/13/20 revealed right upper lobe lung nodule, not FDG avid. She was referred to Dr. Cortez by Dr. Tonny Paulson for surgical evaluation and was deemed a good surgical candidate. On 2/10/21 she underwent an uncomplicated right VATS, RA RULx, MLND. Post operatively she was brought to the PACU with 1 CT in place, on suction, no air leak. POD1 CT removed, f/u CXR stable.     64 y/o female, former smoker, with a PMHx of + DENNY , GERD, with recent CT chest completed on 12/04/20 interval increase in size of right upper lobe nodule. This nodule was previously biopsied in 2017 which revealed interstitial fibrosis. PET CT completed 12/13/20 revealed right upper lobe lung nodule, not FDG avid. She was referred to Dr. Cortez by Dr. Tonny Paulson for surgical evaluation and was deemed a good surgical candidate. On 2/10/21 she underwent an uncomplicated right VATS, RA RULx, MLND. Post operatively she was brought to the PACU with 1 CT in place, on suction, no air leak. POD1 CT removed, f/u CXR stable.  POD2 small apical air space noted on CXR, afternoon CXR was repeated and stable. As per Dr. Cortez and Dr. Soto patient was stable and ready for discharge home. At time of discharge patient was saturating well on room air, tolerating PO diet, pain was well controlled, and she felt ready to go home.    Over 30 minutes was spent with the patient reviewing the discharge material including medications, follow up appointments, recovery, concerning symptoms, and how to contact their health care providers if they have questions

## 2021-02-12 NOTE — DISCHARGE NOTE PROVIDER - NSDCCPTREATMENT_GEN_ALL_CORE_FT
PRINCIPAL PROCEDURE  Procedure: Lobectomy, lung, upper lobe, right, robot-assisted  Findings and Treatment:

## 2021-02-12 NOTE — DISCHARGE NOTE PROVIDER - NSDCADMDATE_GEN_ALL_CORE_FT
Deepak Dougherty  Identified pt with two pt identifiers(name and ). Chief Complaint   Patient presents with    Dizziness     BP issue/ might need glasses per pt       Reviewed record In preparation for visit and have obtained necessary documentation. Has info on advanced directive but has not filled them out. 1. Have you been to the ER, urgent care clinic or hospitalized since your last visit? No     2. Have you seen or consulted any other health care providers outside of the [address] [place] [address] since your last visit? Include any pap smears or colon screening. ENT wed    Vitals reviewed with provider.     Health Maintenance reviewed:     Health Maintenance Due   Topic    Shingrix Vaccine Age 50> (1 of 2)          Wt Readings from Last 3 Encounters:   18 224 lb 1.6 oz (101.7 kg)   18 224 lb (101.6 kg)   18 228 lb (103.4 kg)        Temp Readings from Last 3 Encounters:   18 98.1 °F (36.7 °C) (Oral)   18 98.1 °F (36.7 °C) (Oral)   18 97.6 °F (36.4 °C) (Oral)        BP Readings from Last 3 Encounters:   18 146/76   18 138/68   18 131/59        Pulse Readings from Last 3 Encounters:   18 (!) 59   18 66   18 75        Vitals:    18 1002 18 1003 18 1004   BP: 137/80 149/75 146/76   Pulse: (!) 59     Resp: 18     Temp: 98.1 °F (36.7 °C)     TempSrc: Oral     SpO2: 96%     Weight: 224 lb 1.6 oz (101.7 kg)     Height: 5' 9\" (1.753 m)     PainSc:   0 - No pain            Learning Assessment:   :       Learning Assessment 10/5/2017 2014   PRIMARY LEARNER Patient Patient   HIGHEST LEVEL OF EDUCATION - PRIMARY LEARNER  - DID NOT GRADUATE HIGH SCHOOL   BARRIERS PRIMARY LEARNER - NONE   CO-LEARNER CAREGIVER - No   PRIMARY LANGUAGE ENGLISH ENGLISH    NEED - No   LEARNER PREFERENCE PRIMARY LISTENING LISTENING     DEMONSTRATION -     READING -   ANSWERED BY patient Patient   RELATIONSHIP SELF SELF Depression Screening:   :       PHQ over the last two weeks 9/4/2018   Little interest or pleasure in doing things Not at all   Feeling down, depressed, irritable, or hopeless Not at all   Total Score PHQ 2 0        Fall Risk Assessment:   :       Fall Risk Assessment, last 12 mths 9/4/2018   Able to walk? Yes   Fall in past 12 months? No   Fall with injury? -        Abuse Screening:   :       Abuse Screening Questionnaire 9/4/2018 7/3/2018 10/5/2017   Do you ever feel afraid of your partner? N N N   Are you in a relationship with someone who physically or mentally threatens you? N N N   Is it safe for you to go home?  Y Y Y        ADL Screening:   :       ADL Assessment 7/3/2018   Feeding yourself No Help Needed   Getting from bed to chair No Help Needed   Getting dressed No Help Needed   Bathing or showering No Help Needed   Walk across the room (includes cane/walker) No Help Needed   Using the telphone No Help Needed   Taking your medications No Help Needed   Preparing meals No Help Needed   Managing money (expenses/bills) No Help Needed   Moderately strenuous housework (laundry) No Help Needed   Shopping for personal items (toiletries/medicines) No Help Needed   Shopping for groceries No Help Needed   Driving No Help Needed   Climbing a flight of stairs No Help Needed   Getting to places beyond walking distances No Help Needed 10-Feb-2021 05:26

## 2021-02-12 NOTE — DISCHARGE NOTE PROVIDER - CARE PROVIDERS DIRECT ADDRESSES
[email or web address]

## 2021-02-12 NOTE — PROGRESS NOTE ADULT - SUBJECTIVE AND OBJECTIVE BOX
Patient discussed on morning rounds with Dr. Cortez    Operation / Date: 2/10/21 R VATS, RA Right upper lobectomy, MLND    SUBJECTIVE ASSESSMENT:  65y Female assessed post operatively in the PACU. Complains of some right sided back pain, worse with deep breath. Denies fever, chills, nausea, vomiting.     Vital Signs Last 24 Hrs  T(C): 37.1 (10 Feb 2021 18:28), Max: 37.1 (10 Feb 2021 18:28)  T(F): 98.8 (10 Feb 2021 18:28), Max: 98.8 (10 Feb 2021 18:28)  HR: 73 (10 Feb 2021 18:28) (72 - 75)  BP: 136/70 (10 Feb 2021 18:28) (105/58 - 136/70)  BP(mean): 96 (10 Feb 2021 18:28) (76 - 96)  RR: 16 (10 Feb 2021 18:28) (15 - 26)  SpO2: 100% (10 Feb 2021 18:28) (99% - 100%)  I&O's Detail    10 Feb 2021 07:01  -  10 Feb 2021 19:46  --------------------------------------------------------  IN:    IV PiggyBack: 150 mL    Lactated Ringers: 200 mL  Total IN: 350 mL    OUT:    Chest Tube (mL): 32 mL    Voided (mL): 300 mL  Total OUT: 332 mL    Total NET: 18 mL      CHEST TUBE:  Yes AIR LEAKS: No. Suction    VITA DRAIN:  No.  EPICARDIAL WIRES: No.  TIE DOWNS: Yes  ALBERTO: No.    Physical Exam  CONSTITUTIONAL: Well appearing in NAD   NEURO: A&OX3. No focal deficits noted, moving bilateral upper and lower extremities                    CV: RRR, no murmurs, rubs, gallops  RESPIRATORY: Clear to auscultation bilateral anterior lung fields, no wheezes, rales, rhonchi   GI: +BS, NT/ND  MUSKULOSKELETAL: No peripheral edema or calf tenderness. Full strength and ROM bilateral upper and lower extremities   VASCULAR: Bilateral distal pulses 2+  INCISIONS: R VATS incisions clean, dry, intact       LABS:                        11.0   11.97 )-----------( 282      ( 10 Feb 2021 13:33 )             35.0       COUMADIN: No    PT/INR - ( 10 Feb 2021 13:33 )   PT: 13.2 sec;   INR: 1.11          PTT - ( 10 Feb 2021 13:33 )  PTT:29.5 sec    02-10    139  |  106  |  15  ----------------------------<  179<H>  4.1   |  23  |  0.69    Ca    8.9      10 Feb 2021 13:33    MEDICATIONS  (STANDING):  BUpivacaine liposome 1.3% Injectable (no eMAR) 20 milliLiter(s) Local Injection once  ceFAZolin   IVPB 2000 milliGRAM(s) IV Intermittent every 8 hours  dextrose 40% Gel 15 Gram(s) Oral once  dextrose 5%. 1000 milliLiter(s) (50 mL/Hr) IV Continuous <Continuous>  dextrose 5%. 1000 milliLiter(s) (100 mL/Hr) IV Continuous <Continuous>  dextrose 50% Injectable 25 Gram(s) IV Push once  dextrose 50% Injectable 12.5 Gram(s) IV Push once  dextrose 50% Injectable 25 Gram(s) IV Push once  famotidine Injectable 20 milliGRAM(s) IV Push every 12 hours  glucagon  Injectable 1 milliGRAM(s) IntraMuscular once  heparin   Injectable 5000 Unit(s) SubCutaneous every 8 hours  insulin lispro (ADMELOG) corrective regimen sliding scale   SubCutaneous Before meals and at bedtime  lactated ringers. 1000 milliLiter(s) (50 mL/Hr) IV Continuous <Continuous>  lidocaine   Patch 1 Patch Transdermal daily    MEDICATIONS  (PRN):        RADIOLOGY & ADDITIONAL TESTS:    
Patient discussed on morning rounds with Dr. Cortez    Operation / Date: 2/10/21 R VATS, RA Right upper lobectomy, MLND    Surgeon: Dr. Cortez    Referring Physician: Dr. Paulson    SUBJECTIVE ASSESSMENT:  65y Female assessed at bedside this AM. Pain is better controlled today. No acute complaints, is coughing with some pain and states her breathing is better. Is using IS (1500 cc) and is ambulating without difficulty. Has not had a BM but endorses flatus. Denies abdominal pain. Denies fever, chills, nausea, vomiting.     HOSPITAL COURSE:  64 y/o female, former smoker, with a PMHx of + DENNY , GERD, with recent CT chest completed on 12/04/20 interval increase in size of right upper lobe nodule. This nodule was previously biopsied in 2017 which revealed interstitial fibrosis. PET CT completed 12/13/20 revealed right upper lobe lung nodule, not FDG avid. She was referred to Dr. Cortez by Dr. Tonny Paulson for surgical evaluation and was deemed a good surgical candidate. On 2/10/21 she underwent an uncomplicated right VATS, RA RULx, MLND. Post operatively she was brought to the PACU with 1 CT in place, on suction, no air leak. POD1 CT removed, f/u CXR stable.  POD2 small apical air space noted on CXR, afternoon CXR was repeated and stable. As per Dr. Cortez and Dr. Soto patient was stable and ready for discharge home. At time of discharge patient was saturating well on room air, tolerating PO diet, pain was well controlled, and she felt ready to go home.    Over 30 minutes was spent with the patient reviewing the discharge material including medications, follow up appointments, recovery, concerning symptoms, and how to contact their health care providers if they have questions    Vital Signs Last 24 Hrs  T(C): 36.4 (12 Feb 2021 09:18), Max: 36.6 (11 Feb 2021 21:19)  T(F): 97.6 (12 Feb 2021 09:18), Max: 97.9 (11 Feb 2021 21:19)  HR: 67 (12 Feb 2021 12:30) (67 - 84)  BP: 106/56 (12 Feb 2021 16:00) (98/56 - 112/51)  BP(mean): 78 (12 Feb 2021 16:00) (68 - 78)  RR: 19 (12 Feb 2021 16:00) (16 - 19)  SpO2: 98% (12 Feb 2021 16:00) (94% - 100%)    EPICARDIAL WIRES REMOVED: N/A  TIE DOWNS REMOVED: No    Physical Exam  CONSTITUTIONAL: Well appearing in NAD   NEURO: A&OX3. No focal deficits noted, moving bilateral upper and lower extremities                    CV: RRR, no murmurs, rubs, gallops  RESPIRATORY: Clear to auscultation bilateral anterior lung fields, no wheezes, rales, rhonchi   GI: +BS, NT/ND  MUSKULOSKELETAL: No peripheral edema or calf tenderness. Full strength and ROM bilateral upper and lower extremities   VASCULAR: Bilateral distal pulses 2+  INCISIONS: R VATS incisions clean, dry, intact     LABS:                        10.0   8.02  )-----------( 239      ( 12 Feb 2021 06:01 )             32.7       COUMADIN:  No    02-12    139  |  105  |  17  ----------------------------<  98  4.7   |  28  |  0.81    Ca    8.7      12 Feb 2021 06:01  Mg     2.1     02-12    Discharge CXR:  < from: Xray Chest 1 View-PORTABLE IMMEDIATE (Xray Chest 1 View-PORTABLE IMMEDIATE .) (02.12.21 @ 13:08) >    IMPRESSION: Small right apical pneumothorax, unchanged from earlier same day, slightly increased since yesterday February 11, 2021.    < end of copied text >    Med Reconciliation:  Medication Reconciliation Status	Admission Reconciliation is Completed  Discharge Reconciliation is Completed  	  Discharge Medications	NexIUM 40 mg oral delayed release capsule: 1 cap(s) orally once a day  Tylenol Extra Strength 500 mg oral tablet: 2 tab(s) orally every 6 hours, As Needed  	  ,  ,     Care Plan/Procedures:  Goal(s)	To get better and follow your care plan as instructed.  Discharge Diagnoses, Assessment and Plan of Treatment	PRINCIPAL DISCHARGE DIAGNOSIS  Diagnosis: Nodule of right lung  Assessment and Plan of Treatment:         Discharge Procedures, Findings and Treatment	PRINCIPAL PROCEDURE  Procedure: Lobectomy, lung, upper lobe, right, robot-assisted  Findings and Treatment:            Follow Up:  Care Providers for Follow up (PCP/Outpatient Provider)	Lawrence Cortez)  Surgery; Thoracic Surgery  [address] [place] [address]  Phone: [phone]  Fax: [phone]  Scheduled Appointment: 02/18/2021 11:00 AM    Tonny Paulson)  Critical Care Medicine; Pulmonary Disease; Sleep Medicine  [address] [place] [address], [address]  [place] [address]  Phone: [phone]  Fax: [phone]  Follow Up Time:     Patient's Scheduled Appointments	KAYA GANN ; 02/18/2021 ; [facility] [place] [address]  KAYA GANN ; 02/25/2021 ; [facility] Cardio [address]  KAYA GANN ; 03/03/2021 ; NPP Rheum [address]  Activity	Do not drive or operate machinery, Do not make important decisions, No heavy lifting/straining, Showering allowed, Stairs allowed, Walking - Indoors allowed, Walking - Outdoors allowed  Additional Instructions	- You have one suture in place where your chest tube was. Please leave this in place until your appointment with Dr. Cortez.    -Walk daily as tolerated and use your incentive spirometer every hour.    -No driving or strenuous activity/exercise for 6 weeks, or until cleared by your surgeon.    -Gently clean your incisions with anti-bacterial soap and water, pat dry.  You may leave them open to air.    -Call your doctor if you have shortness of breath, chest pain not relieved by pain medication, dizziness, fever >101.5, or increased redness or drainage from incisions.        
Patient discussed on morning rounds with Dr. Cortez    Operation / Date: 2/10/21 R VATS, RA Right upper lobectomy, MLND    SUBJECTIVE ASSESSMENT:  65y Female assessed at bedside this morning. Patient complained of right sided pain, associated with the chest tube, better after tube removal. Denies SOB. Endorses mild cough. No BM post op yet, endorses flatus. Is using IS (1250 cc). Denies fever, chills, nausea, vomiting.     Vital Signs Last 24 Hrs  T(C): 36.3 (11 Feb 2021 09:16), Max: 37.1 (10 Feb 2021 18:28)  T(F): 97.3 (11 Feb 2021 09:16), Max: 98.8 (10 Feb 2021 18:28)  HR: 73 (11 Feb 2021 09:16) (63 - 76)  BP: 102/53 (11 Feb 2021 09:16) (80/52 - 136/70)  BP(mean): 74 (11 Feb 2021 09:16) (62 - 96)  RR: 18 (11 Feb 2021 09:16) (13 - 26)  SpO2: 100% (11 Feb 2021 09:16) (98% - 100%)  I&O's Detail    10 Feb 2021 07:01  -  11 Feb 2021 07:00  --------------------------------------------------------  IN:    IV PiggyBack: 150 mL    Lactated Ringers: 200 mL  Total IN: 350 mL    OUT:    Chest Tube (mL): 87 mL    Voided (mL): 500 mL  Total OUT: 587 mL    Total NET: -237 mL      11 Feb 2021 07:01  -  11 Feb 2021 11:15  --------------------------------------------------------  IN:  Total IN: 0 mL    OUT:    Voided (mL): 250 mL  Total OUT: 250 mL    Total NET: -250 mL    CHEST TUBE:  No  VITA DRAIN:  No.  EPICARDIAL WIRES: No.  TIE DOWNS: Yes  ALBERTO: No.    Physical Exam  CONSTITUTIONAL: Well appearing in NAD   NEURO: A&OX3. No focal deficits noted, moving bilateral upper and lower extremities                    CV: RRR, no murmurs, rubs, gallops  RESPIRATORY: Clear to auscultation bilateral anterior lung fields, no wheezes, rales, rhonchi   GI: +BS, NT/ND  MUSKULOSKELETAL: No peripheral edema or calf tenderness. Full strength and ROM bilateral upper and lower extremities   VASCULAR: Bilateral distal pulses 2+  INCISIONS: R VATS incisions clean, dry, intact     LABS:                        10.2   12.28 )-----------( 273      ( 11 Feb 2021 10:32 )             32.9       COUMADIN: No    PT/INR - ( 10 Feb 2021 13:33 )   PT: 13.2 sec;   INR: 1.11          PTT - ( 10 Feb 2021 13:33 )  PTT:29.5 sec    02-11    137  |  102  |  15  ----------------------------<  100<H>  3.7   |  25  |  0.82    Ca    8.5      11 Feb 2021 10:32  Mg     1.8     02-11    MEDICATIONS  (STANDING):  BUpivacaine liposome 1.3% Injectable (no eMAR) 20 milliLiter(s) Local Injection once  dextrose 40% Gel 15 Gram(s) Oral once  dextrose 5%. 1000 milliLiter(s) (50 mL/Hr) IV Continuous <Continuous>  dextrose 5%. 1000 milliLiter(s) (100 mL/Hr) IV Continuous <Continuous>  dextrose 50% Injectable 25 Gram(s) IV Push once  dextrose 50% Injectable 12.5 Gram(s) IV Push once  dextrose 50% Injectable 25 Gram(s) IV Push once  glucagon  Injectable 1 milliGRAM(s) IntraMuscular once  heparin   Injectable 5000 Unit(s) SubCutaneous every 8 hours  insulin lispro (ADMELOG) corrective regimen sliding scale   SubCutaneous Before meals and at bedtime  lactated ringers. 1000 milliLiter(s) (50 mL/Hr) IV Continuous <Continuous>  lidocaine   Patch 1 Patch Transdermal daily  magnesium oxide 800 milliGRAM(s) Oral once  pantoprazole    Tablet 40 milliGRAM(s) Oral before breakfast  potassium chloride    Tablet ER 40 milliEquivalent(s) Oral once    MEDICATIONS  (PRN):  acetaminophen   Tablet .. 650 milliGRAM(s) Oral every 6 hours PRN Mild Pain (1 - 3)      RADIOLOGY & ADDITIONAL TESTS:  < from: Xray Chest 1 View- PORTABLE-Routine (Xray Chest 1 View- PORTABLE-Routine in AM.) (02.11.21 @ 01:58) >  INTERPRETATION:  Clinical history/reason for exam: Postop.    Frontal chest.    COMPARISON: February 10, 2021.    Findings/  impression: Stable positioning right chest tube. Heart, lungs and mediastinum are unremarkable. Stable bony structures.    < end of copied text >

## 2021-02-12 NOTE — DISCHARGE NOTE NURSING/CASE MANAGEMENT/SOCIAL WORK - PATIENT PORTAL LINK FT
You can access the FollowMyHealth Patient Portal offered by [facility] by registering at the following website: [email or web address]. By joining [employer]’s FollowMyHealth portal, you will also be able to view your health information using other applications (apps) compatible with our system.

## 2021-02-12 NOTE — DISCHARGE NOTE PROVIDER - NSDCFUADDINST_GEN_ALL_CORE_FT
- You have one suture in place where your chest tube was. Please leave this in place until your appointment with Dr. Cortez.    -Walk daily as tolerated and use your incentive spirometer every hour.    -No driving or strenuous activity/exercise for 6 weeks, or until cleared by your surgeon.    -Gently clean your incisions with anti-bacterial soap and water, pat dry.  You may leave them open to air.    -Call your doctor if you have shortness of breath, chest pain not relieved by pain medication, dizziness, fever >101.5, or increased redness or drainage from incisions.

## 2021-02-15 LAB — SURGICAL PATHOLOGY STUDY: SIGNIFICANT CHANGE UP

## 2021-02-16 ENCOUNTER — NON-APPOINTMENT (OUTPATIENT)
Age: 66
End: 2021-02-16

## 2021-02-18 ENCOUNTER — APPOINTMENT (OUTPATIENT)
Dept: THORACIC SURGERY | Facility: CLINIC | Age: 66
End: 2021-02-18
Payer: COMMERCIAL

## 2021-02-19 ENCOUNTER — NON-APPOINTMENT (OUTPATIENT)
Age: 66
End: 2021-02-19

## 2021-02-19 ENCOUNTER — APPOINTMENT (OUTPATIENT)
Dept: PULMONOLOGY | Facility: CLINIC | Age: 66
End: 2021-02-19
Payer: COMMERCIAL

## 2021-02-19 ENCOUNTER — RESULT REVIEW (OUTPATIENT)
Age: 66
End: 2021-02-19

## 2021-02-19 VITALS
TEMPERATURE: 98.7 F | WEIGHT: 163 LBS | HEART RATE: 111 BPM | OXYGEN SATURATION: 98 % | HEIGHT: 66 IN | SYSTOLIC BLOOD PRESSURE: 142 MMHG | DIASTOLIC BLOOD PRESSURE: 80 MMHG | BODY MASS INDEX: 26.2 KG/M2

## 2021-02-19 PROCEDURE — 99214 OFFICE O/P EST MOD 30 MIN: CPT

## 2021-02-19 PROCEDURE — 99072 ADDL SUPL MATRL&STAF TM PHE: CPT

## 2021-02-19 NOTE — REVIEW OF SYSTEMS
[Cough] : cough [SOB on Exertion] : sob on exertion [Chest Discomfort] : chest discomfort [Negative] : Endocrine

## 2021-02-22 DIAGNOSIS — K21.9 GASTRO-ESOPHAGEAL REFLUX DISEASE WITHOUT ESOPHAGITIS: ICD-10-CM

## 2021-02-22 DIAGNOSIS — D02.21: ICD-10-CM

## 2021-02-22 DIAGNOSIS — Z87.891 PERSONAL HISTORY OF NICOTINE DEPENDENCE: ICD-10-CM

## 2021-02-22 DIAGNOSIS — Z88.2 ALLERGY STATUS TO SULFONAMIDES: ICD-10-CM

## 2021-02-22 DIAGNOSIS — M35.3 POLYMYALGIA RHEUMATICA: ICD-10-CM

## 2021-02-22 DIAGNOSIS — Z91.02 FOOD ADDITIVES ALLERGY STATUS: ICD-10-CM

## 2021-02-22 DIAGNOSIS — R91.1 SOLITARY PULMONARY NODULE: ICD-10-CM

## 2021-02-22 DIAGNOSIS — Z91.041 RADIOGRAPHIC DYE ALLERGY STATUS: ICD-10-CM

## 2021-02-22 NOTE — PROCEDURE
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  pN0

## 2021-02-22 NOTE — PHYSICAL EXAM
[No Acute Distress] : no acute distress [Normal Oropharynx] : normal oropharynx [Erythema] : erythema [Nasal congestion] : nasal congestion [Normal Appearance] : normal appearance [No Neck Mass] : no neck mass [Normal Rate/Rhythm] : normal rate/rhythm [Normal S1, S2] : normal s1, s2 [No Murmurs] : no murmurs [No Resp Distress] : no resp distress [No Acc Muscle Use] : no acc muscle use [Normal Rhythm and Effort] : normal rhythm and effort [Benign] : benign [Normal Gait] : normal gait [No Clubbing] : no clubbing [No Cyanosis] : no cyanosis [No Edema] : no edema [FROM] : FROM [Normal Color/ Pigmentation] : normal color/ pigmentation [No Focal Deficits] : no focal deficits [Oriented x3] : oriented x3 [Normal Affect] : normal affect [Surgical scars] : surgical scars [TextBox_80] : wound healing, slight swelling around R an incision, tender

## 2021-02-22 NOTE — HISTORY OF PRESENT ILLNESS
[Former] : former [Never] : never [< 30 pack-years] : < 30 pack-years [TextBox_4] : This VIP (especially to me) is here for a follow-up visit post op. She was slightly SOB walking here from her x-ray but she did take the stairs (down) and elevator up. Her throat feels much better. She denies a productive cough. She did have some oozing from her incision site which she noticed because her shirt stuck to her when she had to take it off for the x-ray. She was given Percocet but she is only taking it at night. [TextBox_11] : 1 [TextBox_13] : 12

## 2021-02-25 ENCOUNTER — OUTPATIENT (OUTPATIENT)
Dept: OUTPATIENT SERVICES | Facility: HOSPITAL | Age: 66
LOS: 1 days | End: 2021-02-25
Payer: COMMERCIAL

## 2021-02-25 ENCOUNTER — RESULT REVIEW (OUTPATIENT)
Age: 66
End: 2021-02-25

## 2021-02-25 ENCOUNTER — APPOINTMENT (OUTPATIENT)
Dept: CARDIOLOGY | Facility: CLINIC | Age: 66
End: 2021-02-25
Payer: COMMERCIAL

## 2021-02-25 ENCOUNTER — APPOINTMENT (OUTPATIENT)
Dept: THORACIC SURGERY | Facility: CLINIC | Age: 66
End: 2021-02-25
Payer: COMMERCIAL

## 2021-02-25 VITALS
OXYGEN SATURATION: 98 % | BODY MASS INDEX: 26.36 KG/M2 | HEIGHT: 66 IN | RESPIRATION RATE: 18 BRPM | HEART RATE: 89 BPM | SYSTOLIC BLOOD PRESSURE: 138 MMHG | DIASTOLIC BLOOD PRESSURE: 67 MMHG | WEIGHT: 164 LBS | TEMPERATURE: 97.6 F

## 2021-02-25 DIAGNOSIS — Z98.890 OTHER SPECIFIED POSTPROCEDURAL STATES: Chronic | ICD-10-CM

## 2021-02-25 DIAGNOSIS — Z90.89 ACQUIRED ABSENCE OF OTHER ORGANS: Chronic | ICD-10-CM

## 2021-02-25 DIAGNOSIS — Z98.891 HISTORY OF UTERINE SCAR FROM PREVIOUS SURGERY: Chronic | ICD-10-CM

## 2021-02-25 DIAGNOSIS — Z09 ENCOUNTER FOR FOLLOW-UP EXAMINATION AFTER COMPLETED TREATMENT FOR CONDITIONS OTHER THAN MALIGNANT NEOPLASM: ICD-10-CM

## 2021-02-25 PROCEDURE — 71046 X-RAY EXAM CHEST 2 VIEWS: CPT

## 2021-02-25 PROCEDURE — 99024 POSTOP FOLLOW-UP VISIT: CPT

## 2021-02-25 PROCEDURE — 71046 X-RAY EXAM CHEST 2 VIEWS: CPT | Mod: 26

## 2021-02-25 PROCEDURE — 99213 OFFICE O/P EST LOW 20 MIN: CPT | Mod: 95

## 2021-02-25 NOTE — ASSESSMENT
[FreeTextEntry1] : Referred by Dr. Paulson\par \par EKG January 2021 normal sinus rhythm with sinus variation\par \par 65-year-old ex-smoker with pulmonary nodule in for preop evaluation patient \par 2-D echocardiogram  - normal \par treadmill stress test - normal \par zio svt and pac and pvc < 1 %\par \par Patient has no cardiac contraindication for planned procedure\par Patient followup post procedure to start beta blocker or calcium channel blocker for SVT. she prefers to repeat zio patch ( will place next week) to assess svt burden. \par \par suggest incentive spirometry. \par \par Followup patient\par Reason patient request contact:\par Conference took place on video conference on [employer]\par Consent was obtained from patient\par Time spent: 25 minutes > 50% of the time in the encounter in both counseling and coordination of care for any or all of the diagnosis submitted\par \par

## 2021-02-25 NOTE — HISTORY OF PRESENT ILLNESS
[FreeTextEntry1] : 65-year-old female with past medical history of intermittent hypertension and hyperlipidemia, ex-smoker and pulmonary nodule that is increasing in size is here for preop evaluation for lobectomy. Patient complains of episodes of chest pain radiating down to her left arm and numbness in her arm pit. She had a previous cardiac workup that started in 2010 which revealed sinus arrhythmia with PVCs. A 2-D echocardiogram was normal and patient also had a exercise stress test which was unremarkable at that time\par \par patient had outpatient cardiac monitor to assess PVC burden found to have 9 episodes of short SVT 10.2 seconds the lowest episode also found a rare PACs and PVCs patient is not on negative chronotropic medication\par \par Since last visit - had tracy - has cough and dyspnea intermittently. no palpitations or syncope.

## 2021-02-25 NOTE — PHYSICAL EXAM
[General Appearance - Well Developed] : well developed [Normal Appearance] : normal appearance [Well Groomed] : well groomed [General Appearance - Well Nourished] : well nourished [No Deformities] : no deformities [General Appearance - In No Acute Distress] : no acute distress [Normal Conjunctiva] : the conjunctiva exhibited no abnormalities [Eyelids - No Xanthelasma] : the eyelids demonstrated no xanthelasmas [Normal Oral Mucosa] : normal oral mucosa [No Oral Pallor] : no oral pallor [No Oral Cyanosis] : no oral cyanosis [Normal Jugular Venous A Waves Present] : normal jugular venous A waves present [Normal Jugular Venous V Waves Present] : normal jugular venous V waves present [No Jugular Venous Rosa A Waves] : no jugular venous rosa A waves [Respiration, Rhythm And Depth] : normal respiratory rhythm and effort [Exaggerated Use Of Accessory Muscles For Inspiration] : no accessory muscle use [Auscultation Breath Sounds / Voice Sounds] : lungs were clear to auscultation bilaterally [Heart Rate And Rhythm] : heart rate and rhythm were normal [Heart Sounds] : normal S1 and S2 [Murmurs] : no murmurs present [Abdomen Soft] : soft [Abdomen Tenderness] : non-tender [Abdomen Mass (___ Cm)] : no abdominal mass palpated [Abnormal Walk] : normal gait [Gait - Sufficient For Exercise Testing] : the gait was sufficient for exercise testing [Nail Clubbing] : no clubbing of the fingernails [Cyanosis, Localized] : no localized cyanosis [Petechial Hemorrhages (___cm)] : no petechial hemorrhages [Skin Color & Pigmentation] : normal skin color and pigmentation [] : no rash [No Venous Stasis] : no venous stasis [Skin Lesions] : no skin lesions [No Skin Ulcers] : no skin ulcer [No Xanthoma] : no  xanthoma was observed [Oriented To Time, Place, And Person] : oriented to person, place, and time [Affect] : the affect was normal [Mood] : the mood was normal [No Anxiety] : not feeling anxious

## 2021-03-03 ENCOUNTER — APPOINTMENT (OUTPATIENT)
Dept: RHEUMATOLOGY | Facility: CLINIC | Age: 66
End: 2021-03-03
Payer: COMMERCIAL

## 2021-03-03 ENCOUNTER — APPOINTMENT (OUTPATIENT)
Dept: PULMONOLOGY | Facility: CLINIC | Age: 66
End: 2021-03-03
Payer: COMMERCIAL

## 2021-03-03 ENCOUNTER — NON-APPOINTMENT (OUTPATIENT)
Age: 66
End: 2021-03-03

## 2021-03-03 ENCOUNTER — RESULT REVIEW (OUTPATIENT)
Age: 66
End: 2021-03-03

## 2021-03-03 ENCOUNTER — APPOINTMENT (OUTPATIENT)
Dept: CARDIOLOGY | Facility: CLINIC | Age: 66
End: 2021-03-03
Payer: COMMERCIAL

## 2021-03-03 VITALS
DIASTOLIC BLOOD PRESSURE: 60 MMHG | SYSTOLIC BLOOD PRESSURE: 102 MMHG | HEIGHT: 66 IN | WEIGHT: 164 LBS | HEART RATE: 106 BPM | BODY MASS INDEX: 26.36 KG/M2 | OXYGEN SATURATION: 97 %

## 2021-03-03 VITALS
SYSTOLIC BLOOD PRESSURE: 120 MMHG | DIASTOLIC BLOOD PRESSURE: 80 MMHG | BODY MASS INDEX: 26.52 KG/M2 | HEIGHT: 66 IN | WEIGHT: 165 LBS

## 2021-03-03 DIAGNOSIS — Z90.2 ACQUIRED ABSENCE OF LUNG [PART OF]: ICD-10-CM

## 2021-03-03 DIAGNOSIS — M70.62 TROCHANTERIC BURSITIS, LEFT HIP: ICD-10-CM

## 2021-03-03 DIAGNOSIS — J95.811 POSTPROCEDURAL PNEUMOTHORAX: ICD-10-CM

## 2021-03-03 DIAGNOSIS — J38.3 OTHER DISEASES OF VOCAL CORDS: ICD-10-CM

## 2021-03-03 PROCEDURE — 99072 ADDL SUPL MATRL&STAF TM PHE: CPT

## 2021-03-03 PROCEDURE — 20610 DRAIN/INJ JOINT/BURSA W/O US: CPT

## 2021-03-03 PROCEDURE — 36415 COLL VENOUS BLD VENIPUNCTURE: CPT

## 2021-03-03 PROCEDURE — 99214 OFFICE O/P EST MOD 30 MIN: CPT

## 2021-03-03 PROCEDURE — 99214 OFFICE O/P EST MOD 30 MIN: CPT | Mod: 25

## 2021-03-03 RX ADMIN — METHYLPREDNISOLONE ACETATE 1 MG/ML: 40 INJECTION, SUSPENSION INTRA-ARTICULAR; INTRALESIONAL; INTRAMUSCULAR; SOFT TISSUE at 00:00

## 2021-03-03 NOTE — PROCEDURE
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  pN0

## 2021-03-03 NOTE — HISTORY OF PRESENT ILLNESS
[< 30 pack-years] : < 30 pack-years [TextBox_4] : OTF is here for a follow up visit. She is experiencing some upper airway issues with a cough that is onset by talking and has a history of vocal cord cysts. She would like to switch to Dr. Burnette (ENT) because he is great and wonderful and also covered under her insurance. She notices that she has been wheezing in the morning which clears up as the day progresses.  [TextBox_11] : 1 [TextBox_13] : 12

## 2021-03-03 NOTE — PHYSICAL EXAM
[No Acute Distress] : no acute distress [Normal Oropharynx] : normal oropharynx [Erythema] : erythema [Nasal congestion] : nasal congestion [Normal Appearance] : normal appearance [No Neck Mass] : no neck mass [Normal Rate/Rhythm] : normal rate/rhythm [Normal S1, S2] : normal s1, s2 [No Murmurs] : no murmurs [No Resp Distress] : no resp distress [No Acc Muscle Use] : no acc muscle use [Normal Rhythm and Effort] : normal rhythm and effort [Surgical scars] : surgical scars [Benign] : benign [Normal Gait] : normal gait [No Clubbing] : no clubbing [No Cyanosis] : no cyanosis [No Edema] : no edema [FROM] : FROM [Normal Color/ Pigmentation] : normal color/ pigmentation [No Focal Deficits] : no focal deficits [Oriented x3] : oriented x3 [Normal Affect] : normal affect [TextBox_11] : erythematous oropharynx [TextBox_80] : wound healing, slight swelling around R an incision, tender

## 2021-03-04 LAB
ALBUMIN SERPL ELPH-MCNC: 4.8 G/DL
ALP BLD-CCNC: 98 U/L
ALT SERPL-CCNC: 15 U/L
ANION GAP SERPL CALC-SCNC: 14 MMOL/L
AST SERPL-CCNC: 17 U/L
BASOPHILS # BLD AUTO: 0.05 K/UL
BASOPHILS NFR BLD AUTO: 0.6 %
BILIRUB SERPL-MCNC: 0.3 MG/DL
BUN SERPL-MCNC: 13 MG/DL
CALCIUM SERPL-MCNC: 10.2 MG/DL
CHLORIDE SERPL-SCNC: 101 MMOL/L
CO2 SERPL-SCNC: 25 MMOL/L
CREAT SERPL-MCNC: 0.88 MG/DL
CRP SERPL-MCNC: 11 MG/L
EOSINOPHIL # BLD AUTO: 0.02 K/UL
EOSINOPHIL NFR BLD AUTO: 0.3 %
ERYTHROCYTE [SEDIMENTATION RATE] IN BLOOD BY WESTERGREN METHOD: 45 MM/HR
GLUCOSE SERPL-MCNC: 98 MG/DL
HCT VFR BLD CALC: 42.1 %
HGB BLD-MCNC: 13 G/DL
IMM GRANULOCYTES NFR BLD AUTO: 0.3 %
LYMPHOCYTES # BLD AUTO: 2.02 K/UL
LYMPHOCYTES NFR BLD AUTO: 25.4 %
MAN DIFF?: NORMAL
MCHC RBC-ENTMCNC: 30.7 PG
MCHC RBC-ENTMCNC: 30.9 GM/DL
MCV RBC AUTO: 99.5 FL
MONOCYTES # BLD AUTO: 0.58 K/UL
MONOCYTES NFR BLD AUTO: 7.3 %
NEUTROPHILS # BLD AUTO: 5.25 K/UL
NEUTROPHILS NFR BLD AUTO: 66.1 %
PLATELET # BLD AUTO: 400 K/UL
POTASSIUM SERPL-SCNC: 4.1 MMOL/L
PROT SERPL-MCNC: 7.4 G/DL
RBC # BLD: 4.23 M/UL
RBC # FLD: 12.4 %
SODIUM SERPL-SCNC: 140 MMOL/L
WBC # FLD AUTO: 7.94 K/UL

## 2021-03-04 RX ORDER — METHYLPRED ACET/NACL,ISO-OS/PF 40 MG/ML
40 VIAL (ML) INJECTION
Qty: 1 | Refills: 0 | Status: COMPLETED | OUTPATIENT
Start: 2021-03-03

## 2021-03-08 PROBLEM — M70.62 TROCHANTERIC BURSITIS OF LEFT HIP: Status: ACTIVE | Noted: 2020-05-06

## 2021-03-08 NOTE — PROCEDURE
[FreeTextEntry1] : \par TROCHANTERIC BURSITIS:\par Patient placed in lateral recumbent position. Point of maximal tenderness identified. Area cleaned with alcohol. L Trochanteric bursa injected with 1 cc of Lidocaine 1% and DepoMedrol 40mg. Patient tolerated the procedure well without complications. Post-procedure instructions given.\par

## 2021-03-08 NOTE — HISTORY OF PRESENT ILLNESS
[FreeTextEntry1] : Her physicians have been following a lung nodule for awhile.  \par She had a repeat CT recently, which showed that the nodules were getting bigger and changing shape.  had She had a biopsy 3 weeks ago, which showed they were malignant.  She underwent a robotic lobectomy at [facility].  Nodule identified as a stage 1 adenocarcinoma.  No more treatment needed.  She needs repeat CT in 3 months.  When she talks, she coughs.\par She saw Dr. Gifford and had a holter monitor placed.\par She had a repeat CXR, which was normal.\par She has a h/o cysts in her trachea that was thought to be due to intubation for surgery.  She will see Dr. Burnette later today to evaluate if this is the etiology  of the coughing.,\par \par She has severe pain of her left trochanteric bursitis.  No benefit with PT.\par She had an epidural by Dr. Rodríguez, without improvement.\par \par No pain from PMR.  She denies headache, blurry vision, scalp and jaw tenderness.\par \par She takes oxycodone 1 tab at night for post-op pain, but it doesn’t help her trochanteric bursitis pain.\par

## 2021-03-08 NOTE — ASSESSMENT
[FreeTextEntry1] : Latest inflammatory markers were low.  Will repeat, although I won't be surprised if they are elevated due to her recent diagnosis and surgery.\par \par Will give injection for trochanteric bursitis for temporary relief, as the pain is significantly interfering with her QOL and ability to walk.

## 2021-03-17 ENCOUNTER — RESULT REVIEW (OUTPATIENT)
Age: 66
End: 2021-03-17

## 2021-03-18 ENCOUNTER — APPOINTMENT (OUTPATIENT)
Dept: THORACIC SURGERY | Facility: CLINIC | Age: 66
End: 2021-03-18

## 2021-04-01 ENCOUNTER — APPOINTMENT (OUTPATIENT)
Dept: CARDIOLOGY | Facility: CLINIC | Age: 66
End: 2021-04-01
Payer: COMMERCIAL

## 2021-04-01 VITALS
WEIGHT: 160 LBS | HEIGHT: 66 IN | DIASTOLIC BLOOD PRESSURE: 84 MMHG | SYSTOLIC BLOOD PRESSURE: 122 MMHG | BODY MASS INDEX: 25.71 KG/M2

## 2021-04-01 PROCEDURE — 99214 OFFICE O/P EST MOD 30 MIN: CPT

## 2021-04-01 PROCEDURE — 99072 ADDL SUPL MATRL&STAF TM PHE: CPT

## 2021-04-01 NOTE — HISTORY OF PRESENT ILLNESS
[FreeTextEntry1] : 65-year-old female with past medical history of intermittent hypertension and hyperlipidemia, ex-smoker and pulmonary nodule s/p lobectomy. \par \par patient had outpatient cardiac monitor to assess PVC burden found to have 9 episodes of short SVT 10.2 seconds the lowest episode also found a rare PACs and PVCs patient is not on negative chronotropic medication\par \par Since last visit - had surgery - has cough and dyspnea intermittently. no palpitations or syncope.

## 2021-04-01 NOTE — ASSESSMENT
[FreeTextEntry1] : Referred by Dr. Paulson\par \par EKG January 2021 normal sinus rhythm with sinus variation\par \par 65-year-old ex-smoker with pulmonary nodule in for preop evaluation patient \par 2-D echocardiogram  - Jan 2021 - normal \par treadmill stress test - Jan 2021 - normal \par zio svt and pac and pvc < 1 %\par \par Patient followup post procedure to start beta blocker or calcium channel blocker for SVT. she prefers to repeat zio patch ( will place next week) to assess svt burden. \par \par suggest incentive spirometry. \par \par April 2021 - \par s/p surgery had stage 1 lung ca\par zio worn 1 run of VT\par 18 runs of SVT in 9 days longest 14 beats. \par \par suggest ct angio for complete ischemic eval\par Toprol for SVT. \par \par \par \par

## 2021-04-06 ENCOUNTER — RESULT REVIEW (OUTPATIENT)
Age: 66
End: 2021-04-06

## 2021-04-07 ENCOUNTER — APPOINTMENT (OUTPATIENT)
Dept: NEUROSURGERY | Facility: CLINIC | Age: 66
End: 2021-04-07
Payer: COMMERCIAL

## 2021-04-07 VITALS
SYSTOLIC BLOOD PRESSURE: 134 MMHG | HEIGHT: 66 IN | BODY MASS INDEX: 41.78 KG/M2 | DIASTOLIC BLOOD PRESSURE: 70 MMHG | HEART RATE: 74 BPM | TEMPERATURE: 97.2 F | WEIGHT: 260 LBS

## 2021-04-07 VITALS
SYSTOLIC BLOOD PRESSURE: 132 MMHG | WEIGHT: 160 LBS | HEIGHT: 66 IN | HEART RATE: 91 BPM | DIASTOLIC BLOOD PRESSURE: 84 MMHG | BODY MASS INDEX: 25.71 KG/M2 | TEMPERATURE: 97.2 F

## 2021-04-07 DIAGNOSIS — M54.42 LUMBAGO WITH SCIATICA, LEFT SIDE: ICD-10-CM

## 2021-04-07 PROCEDURE — 99205 OFFICE O/P NEW HI 60 MIN: CPT

## 2021-04-07 PROCEDURE — 99072 ADDL SUPL MATRL&STAF TM PHE: CPT

## 2021-04-07 NOTE — HISTORY OF PRESENT ILLNESS
[de-identified] : KAYA GANN is a 66 year female with a PMH of HTN, HLD, ex-smoker, paroxysmal SVT, Hx Pulmonary nodule s/p VATS 2/2021-path adenocarcinoma in situ, GERD, Lumbar radiculopathy seen by Dr. Rodríguez, Pain management who presents to the office today for neurosurgical consultation due to long standing low back pain radiating to bilateral lower extremities, worse while walking.  She complains of weakness and heaviness in the legs.  Specifically, her pain is worse in the left leg.  It begins in the left hip and radiates down to the left foot, specifically along the posterolateral aspect of the leg.  She denies numbness/tingling.  Denies bowel/bladder incontinence.  She has undergone epidural steroid injections, trigger point injections, and physical therapy in the past with no alleviation of her symptoms.  Her pain significantly impacts her mobility and her quality of life.\par

## 2021-04-07 NOTE — END OF VISIT
[FreeTextEntry3] : I have seen the patient and reviewed the case together with PA and I agree with the final recommendations and plan of care.\par \par Sathya Muñoz MD\par Neurosurgery\par \par  [Time Spent: ___ minutes] : I have spent [unfilled] minutes of time on the encounter. [>50% of the face to face encounter time was spent on counseling and/or coordination of care for ___] : Greater than 50% of the face to face encounter time was spent on counseling and/or coordination of care for [unfilled]

## 2021-04-07 NOTE — DATA REVIEWED
[de-identified] : \par Report date: 11/9/2020 \par  \par  View Order\par \par \par (Report matches study selected on Patient History pane)\par \par \par   \par \par \par Exam: MRI LUMBAR SPINE \par Order#: [id number] \par \par \par \par History: RADICULOPATHY, LUMBAR REGION \par \par  MRI lumbar spine without contrast 11/09/2020 \par \par  Multiplanar MR imaging of the lumbar spine obtained. The study preformed on a 3 matthew magnet. \par \par  Comparison made previous MRI lumbar spine 12/1/2017 \par \par  There is mild straightening of the lumbar spine. There is again grade 1 spondylolisthesis of L4 on \par L5. There is again slight retrolisthesis of L5 on S1. There is no vertebral body compression \par fracture. There is mild heterogeneity of the bone marrow again noted. There is no pathologic bone \par marrow edema. \par \par  The distal thoracic cord and conus are normal in size and signal intensity. There is interval \par kinking/disorganization and crowding of the cauda equina at L1-L2 level which may relate to L2-L3 \par progressive stenosis. \par \par  There is multilevel lumbar spondylosis and intervertebral disc height loss with disc desiccation. \par There is prominence of posterior epidural fat. \par \par  Findings at specific disc levels: \par \par  L5-S1: Diffuse disc bulge with slight increase prominence of left paracentral small extruded disc \par herniation compressing anterolateral aspect of sac and left S1 nerve root. Bilateral facet arthritis\par and ligamentum flavum thickening. Bilateral left greater the right moderate to severe foraminal \par stenosis. \par \par  L4-L5: No change. Grade 1 spondylolisthesis. Diffuse disc bulge flattens sac. Severe central and \par lateral recess stenosis. Bilateral hypertrophic facet arthritis and ligamentum flavum thickening. \par Bilateral moderate foraminal stenosis. \par \par  L3-L4: No change. Diffuse disc bulge flattens ventral aspect of sac. Moderate central spinal \par stenosis. Bilateral facet arthritis and ligamentum flavum thickening. Bilateral moderate foraminal \par stenosis. \par \par  L2-L3: Increase in size of broad-based diffuse disc bulge with superimposed right paracentral disc \par protrusion compressing sac resulting in moderate to central spinal stenosis. Mild to moderate \par bilateral foraminal stenosis. \par \par  L1-L2: No disc bulge or herniation. No central canal or foraminal nerve root compromise. \par \par  T12-L1: No disc bulge or herniation. No central canal or foraminal nerve root compromise. \par \par  T10-11 and T11-T12 sagittal only images reveals disc bulging and facet hypertrophy resulting in \par suspected mild central canal compromise at each level. \par \par  There is no evidence of paraspinal mass. \par \par \par \par  Impression: \par \par  Multilevel lumbar spondylosis with grade 1 spondylolisthesis L4 on L5 and slight retrolisthesis of \par L5 on S1. \par \par  L2-3 progressive diffuse disc bulge with small right paracentral disc protrusion compressing sac \par resulting in moderate central spinal stenosis with suspected kinking/disorganization with crowding \par of adjacent cephalad cauda equina nerve roots. \par \par  L5-S1: Diffuse disc bulge with suspected slight increase prominence of left paracentral extruded \par disc herniation compressing anterolateral aspect of sac and left S1 nerve root. \par \par  No change in L4-L5 grade 1 spondylolisthesis and severe central spinal stenosis. \par \par  No change in L3-4 moderate central spinal stenosis. \par \par ***Electronically Signed *** \par ----------------------------------------------- \par Pérez Herbert MD 11/09/20 1503 \par \par Dictated on 11/09/20

## 2021-04-07 NOTE — ASSESSMENT
[FreeTextEntry1] : I have discussed the natural history and treatment options for neurogenic claudication due to lumbar spinal stenosis with the patient. I explained the indications for observation, conservative management, medical management, physical therapy, pain management approaches and surgery. I explained the different types and surgical approaches including anterior and posterior approaches as well as decompression only procedures and instrumented fusions. I discussed the risks, benefits, possible complications and expected outcome related to each treatment option. The risks of surgery were discussed in detail including but not limited to postoperative infection at the surgical site, hospital acquired pneumonia, hospital acquired urinary tract infection, postoperative meningitis, wound dehiscence, CSF leak, stroke (ischemic and hemorrhagic), postoperative seizures, worsening motor function due to spinal cord or nerve injury, postoperative visual deficit which could be permanent (blindness) when surgery is performed in a prone position, cardiovascular complications (MI, PE, DVT) and I also explained that some of these complications could lead to sepsis, coma or even death.  In the end, my recommendation is to proceed with L3-5 laminectomy and decompression with possible discectomy at the left L5-S1 level.  We will obtain xrays to include flexion and extension to r/o instability and CT scan of the lumbar spine in order to determine if a fusion will be necessary.  She will consider this option for surgery and reach out to us if she wishes to proceed.  If so, we will arrange a date/time in the near future to coordinate with her.  She agrees with the plan of care and verbalizes understanding.  All questions answered.\par \par FR[facility]Y [facility]\jared

## 2021-04-15 ENCOUNTER — RESULT REVIEW (OUTPATIENT)
Age: 66
End: 2021-04-15

## 2021-04-20 ENCOUNTER — RESULT REVIEW (OUTPATIENT)
Age: 66
End: 2021-04-20

## 2021-04-21 ENCOUNTER — APPOINTMENT (OUTPATIENT)
Dept: NEUROSURGERY | Facility: CLINIC | Age: 66
End: 2021-04-21

## 2021-04-21 VITALS
HEIGHT: 65 IN | HEART RATE: 77 BPM | TEMPERATURE: 97.5 F | SYSTOLIC BLOOD PRESSURE: 103 MMHG | WEIGHT: 157 LBS | DIASTOLIC BLOOD PRESSURE: 72 MMHG | BODY MASS INDEX: 26.16 KG/M2

## 2021-04-22 ENCOUNTER — APPOINTMENT (OUTPATIENT)
Dept: NEUROSURGERY | Facility: CLINIC | Age: 66
End: 2021-04-22
Payer: COMMERCIAL

## 2021-04-22 VITALS
DIASTOLIC BLOOD PRESSURE: 71 MMHG | HEIGHT: 65.5 IN | HEART RATE: 67 BPM | TEMPERATURE: 97.8 F | WEIGHT: 157 LBS | BODY MASS INDEX: 25.84 KG/M2 | SYSTOLIC BLOOD PRESSURE: 119 MMHG

## 2021-04-22 PROCEDURE — 99072 ADDL SUPL MATRL&STAF TM PHE: CPT

## 2021-04-22 PROCEDURE — 99215 OFFICE O/P EST HI 40 MIN: CPT

## 2021-04-22 RX ORDER — METOPROLOL TARTRATE 50 MG/1
50 TABLET, FILM COATED ORAL
Qty: 6 | Refills: 1 | Status: DISCONTINUED | COMMUNITY
Start: 2021-04-01 | End: 2021-04-22

## 2021-04-22 NOTE — ASSESSMENT
[FreeTextEntry1] : I have discussed the natural history and treatment options for neurogenic claudication due to lumbar spinal stenosis with the patient. I explained the indications for observation, conservative management, medical management, physical therapy, pain management approaches and surgery. I explained the different types and surgical approaches including anterior and posterior approaches as well as decompression only procedures and instrumented fusions. I discussed the risks, benefits, possible complications and expected outcome related to each treatment option. The risks of surgery were discussed in detail including but not limited to postoperative infection at the surgical site, hospital acquired pneumonia, hospital acquired urinary tract infection, postoperative meningitis, wound dehiscence, CSF leak, stroke (ischemic and hemorrhagic), postoperative seizures, worsening motor function due to spinal cord or nerve injury, postoperative visual deficit which could be permanent (blindness) when surgery is performed in a prone position, cardiovascular complications (MI, PE, DVT) and I also explained that some of these complications could lead to sepsis, coma or even death. In the end, my recommendation is to proceed with L3-5 laminectomy and decompression with discectomy at the left L5-S1 level. F/E Xrays did not show instability. She agrees with the plan of care and verbalizes understanding. All questions answered.\par

## 2021-04-29 ENCOUNTER — APPOINTMENT (OUTPATIENT)
Dept: CARDIOLOGY | Facility: CLINIC | Age: 66
End: 2021-04-29
Payer: COMMERCIAL

## 2021-04-29 VITALS
WEIGHT: 157 LBS | BODY MASS INDEX: 25.84 KG/M2 | SYSTOLIC BLOOD PRESSURE: 110 MMHG | DIASTOLIC BLOOD PRESSURE: 71 MMHG | HEIGHT: 65.5 IN

## 2021-04-29 PROCEDURE — 99072 ADDL SUPL MATRL&STAF TM PHE: CPT

## 2021-04-29 PROCEDURE — 99214 OFFICE O/P EST MOD 30 MIN: CPT

## 2021-04-29 NOTE — HISTORY OF PRESENT ILLNESS
[FreeTextEntry1] : 66-year-old female with past medical history of intermittent hypertension and hyperlipidemia, ex-smoker and pulmonary nodule s/p lobectomy. \par \par patient had outpatient cardiac monitor to assess PVC burden found to have 9 episodes of short SVT 10.2 seconds the lowest episode also found a rare PACs and PVCs patient is not on negative chronotropic medication\par \par Since last visit - had lung surgery - has cough and dyspnea intermittently. \par here for cardiac ct results. \par has rare palpitations. \par pt going for neuro surgery for lumbar stenosis.

## 2021-04-29 NOTE — ASSESSMENT
[FreeTextEntry1] : Referred by Dr. Paulson\par \par EKG January 2021 normal sinus rhythm with sinus variation\par \par 66-year-old ex-smoker with pulmonary nodule in for preop evaluation patient \par 2-D echocardiogram  - Jan 2021 - normal \par treadmill stress test - Jan 2021 - normal \par zio svt and pac and pvc < 1 %\par \par April 2021 - \par s/p surgery had stage 1 lung ca\par zio worn 1 run of VT\par 18 runs of SVT in 9 days longest 14 beats. \par \par Toprol for SVT. \par \par Cardiac CT  with normal coronaries. \par \par Pt has no cardiac contraindication for planned surgery - recommend continuing Toprol. \par \par 30 min.

## 2021-04-29 NOTE — PHYSICAL EXAM
[General Appearance - Well Developed] : well developed [Normal Appearance] : normal appearance [Well Groomed] : well groomed [General Appearance - Well Nourished] : well nourished [No Deformities] : no deformities [General Appearance - In No Acute Distress] : no acute distress [Normal Conjunctiva] : the conjunctiva exhibited no abnormalities [Eyelids - No Xanthelasma] : the eyelids demonstrated no xanthelasmas [Normal Oral Mucosa] : normal oral mucosa [No Oral Pallor] : no oral pallor [No Oral Cyanosis] : no oral cyanosis [Normal Jugular Venous A Waves Present] : normal jugular venous A waves present [Normal Jugular Venous V Waves Present] : normal jugular venous V waves present [No Jugular Venous Rosa A Waves] : no jugular venous rosa A waves [Respiration, Rhythm And Depth] : normal respiratory rhythm and effort [Exaggerated Use Of Accessory Muscles For Inspiration] : no accessory muscle use [Auscultation Breath Sounds / Voice Sounds] : lungs were clear to auscultation bilaterally [Heart Sounds] : normal S1 and S2 [Heart Rate And Rhythm] : heart rate and rhythm were normal [Murmurs] : no murmurs present [Abdomen Soft] : soft [Abdomen Tenderness] : non-tender [Abdomen Mass (___ Cm)] : no abdominal mass palpated [Abnormal Walk] : normal gait [Gait - Sufficient For Exercise Testing] : the gait was sufficient for exercise testing [Nail Clubbing] : no clubbing of the fingernails [Cyanosis, Localized] : no localized cyanosis [Petechial Hemorrhages (___cm)] : no petechial hemorrhages [Skin Color & Pigmentation] : normal skin color and pigmentation [] : no rash [No Venous Stasis] : no venous stasis [Skin Lesions] : no skin lesions [No Skin Ulcers] : no skin ulcer [No Xanthoma] : no  xanthoma was observed [Oriented To Time, Place, And Person] : oriented to person, place, and time [Affect] : the affect was normal [Mood] : the mood was normal [No Anxiety] : not feeling anxious

## 2021-05-04 DIAGNOSIS — Z08 ENCOUNTER FOR FOLLOW-UP EXAMINATION AFTER COMPLETED TREATMENT FOR MALIGNANT NEOPLASM: ICD-10-CM

## 2021-05-04 DIAGNOSIS — Z85.118 ENCOUNTER FOR FOLLOW-UP EXAMINATION AFTER COMPLETED TREATMENT FOR MALIGNANT NEOPLASM: ICD-10-CM

## 2021-05-06 ENCOUNTER — RESULT REVIEW (OUTPATIENT)
Age: 66
End: 2021-05-06

## 2021-05-14 ENCOUNTER — RESULT REVIEW (OUTPATIENT)
Age: 66
End: 2021-05-14

## 2021-05-16 ENCOUNTER — RESULT CHARGE (OUTPATIENT)
Age: 66
End: 2021-05-16

## 2021-05-18 ENCOUNTER — RESULT REVIEW (OUTPATIENT)
Age: 66
End: 2021-05-18

## 2021-05-18 ENCOUNTER — APPOINTMENT (OUTPATIENT)
Dept: NEUROLOGY | Facility: CLINIC | Age: 66
End: 2021-05-18
Payer: COMMERCIAL

## 2021-05-18 PROCEDURE — 95885 MUSC TST DONE W/NERV TST LIM: CPT

## 2021-05-18 PROCEDURE — 95910 NRV CNDJ TEST 7-8 STUDIES: CPT

## 2021-05-18 PROCEDURE — 99072 ADDL SUPL MATRL&STAF TM PHE: CPT

## 2021-05-27 ENCOUNTER — NON-APPOINTMENT (OUTPATIENT)
Age: 66
End: 2021-05-27

## 2021-05-27 ENCOUNTER — LABORATORY RESULT (OUTPATIENT)
Age: 66
End: 2021-05-27

## 2021-05-27 ENCOUNTER — APPOINTMENT (OUTPATIENT)
Dept: PULMONOLOGY | Facility: CLINIC | Age: 66
End: 2021-05-27
Payer: COMMERCIAL

## 2021-05-27 VITALS
HEART RATE: 65 BPM | SYSTOLIC BLOOD PRESSURE: 118 MMHG | TEMPERATURE: 97.8 F | OXYGEN SATURATION: 99 % | HEIGHT: 65.5 IN | WEIGHT: 157 LBS | BODY MASS INDEX: 25.84 KG/M2 | DIASTOLIC BLOOD PRESSURE: 72 MMHG

## 2021-05-27 DIAGNOSIS — Z01.811 ENCOUNTER FOR PREPROCEDURAL RESPIRATORY EXAMINATION: ICD-10-CM

## 2021-05-27 DIAGNOSIS — G89.18 OTHER ACUTE POSTPROCEDURAL PAIN: ICD-10-CM

## 2021-05-27 PROCEDURE — 99215 OFFICE O/P EST HI 40 MIN: CPT

## 2021-05-27 PROCEDURE — 99072 ADDL SUPL MATRL&STAF TM PHE: CPT

## 2021-05-27 NOTE — DISCUSSION/SUMMARY
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  pN0\par \par PFTs 05/21/2021 Actual FEV1 1.93 (Pred 75%) FEV1/FVC- 61, %  % RV/% DLCO 58% \par \par PFT 4/20/2017 FEV1 2.77 (103% predicted) FEV1/FVC 74 no change postBD % % DLCO 81% consistent with mild hyperinflation and a borderline DLCO

## 2021-05-27 NOTE — PHYSICAL EXAM
[No Acute Distress] : no acute distress [Erythema] : erythema [Nasal congestion] : nasal congestion [Turbinate hypertrophy] : turbinate hypertrophy [III] : Mallampati Class: III [Normal Appearance] : normal appearance [No Neck Mass] : no neck mass [Normal Rate/Rhythm] : normal rate/rhythm [Normal S1, S2] : normal s1, s2 [No Murmurs] : no murmurs [No Resp Distress] : no resp distress [No Acc Muscle Use] : no acc muscle use [Normal Rhythm and Effort] : normal rhythm and effort [Surgical scars] : surgical scars [Benign] : benign [Normal Gait] : normal gait [No Clubbing] : no clubbing [No Cyanosis] : no cyanosis [FROM] : FROM [No Edema] : no edema [Normal Color/ Pigmentation] : normal color/ pigmentation [No Focal Deficits] : no focal deficits [Oriented x3] : oriented x3 [Normal Affect] : normal affect [TextBox_11] : erythematous o/p [TextBox_68] : slightly decreased BS [TextBox_80] : Tender R lat cw incision

## 2021-05-27 NOTE — HISTORY OF PRESENT ILLNESS
[< 30 pack-years] : < 30 pack-years [Never] : never [TextBox_4] : The patient returns for follow up of her lung cancer and after her  RUL wedge via robotic VATS and preop for her laminectomy. \par She continues to look 20 years younger than her stated age but still has a persistent cough and had a CT to f/u on the lung w/ Dr Cortez. SHe has noticed inc SOB when outside and walking. no chest pressure although still has R sided pain at the chest tube site. SHe started on Flonase and didn’t feel much better although the globus sensation in her throat resolved. She does have a cyst in the back of her tongue which Dr Diallo said could be from the intubation. In [facility] she has not been sleeping well because of her back pain which is prompting her laminectomy scheduled for 6/17/2021. She is in pain at night and waking up frequently at night whenever she moves and also waking up in the am with severe pains. The back pain also limits her ability to walk. [TextBox_11] : 1 [TextBox_13] : 12

## 2021-05-27 NOTE — REASON FOR VISIT
[Follow-Up] : a follow-up visit [Pulmonary Fibrosis] : pulmonary fibrosis [Pulmonary Nodules] : pulmonary nodules

## 2021-06-01 ENCOUNTER — APPOINTMENT (OUTPATIENT)
Dept: CARDIOLOGY | Facility: CLINIC | Age: 66
End: 2021-06-01
Payer: COMMERCIAL

## 2021-06-01 LAB
25(OH)D3 SERPL-MCNC: 37.8 NG/ML
A ALTERNATA IGE QN: <0.1 KUA/L
A ALTERNATA IGE QN: <0.1 KUA/L
A FUMIGATUS IGE QN: <0.1 KUA/L
A FUMIGATUS IGE QN: <0.1 KUA/L
ALBUMIN SERPL ELPH-MCNC: 4.7 G/DL
ALP BLD-CCNC: 102 U/L
ALT SERPL-CCNC: 20 U/L
ANION GAP SERPL CALC-SCNC: 16 MMOL/L
APPEARANCE: CLEAR
APTT BLD: 35.5 SEC
AST SERPL-CCNC: 18 U/L
BACTERIA: NEGATIVE
BASOPHILS # BLD AUTO: 0.04 K/UL
BASOPHILS NFR BLD AUTO: 0.6 %
BERMUDA GRASS IGE QN: <0.1 KUA/L
BILIRUB SERPL-MCNC: 0.3 MG/DL
BILIRUBIN URINE: NEGATIVE
BLOOD URINE: NEGATIVE
BOXELDER IGE QN: <0.1 KUA/L
BUN SERPL-MCNC: 15 MG/DL
C HERBARUM IGE QN: <0.1 KUA/L
C HERBARUM IGE QN: <0.1 KUA/L
CALCIUM SERPL-MCNC: 9.9 MG/DL
CALIF WALNUT IGE QN: <0.1 KUA/L
CAT DANDER IGE QN: <0.1 KUA/L
CHLORIDE SERPL-SCNC: 99 MMOL/L
CMN PIGWEED IGE QN: <0.1 KUA/L
CO2 SERPL-SCNC: 25 MMOL/L
COLOR: COLORLESS
COMMON RAGWEED IGE QN: <0.1 KUA/L
COTTONWOOD IGE QN: <0.1 KUA/L
CREAT SERPL-MCNC: 0.7 MG/DL
CRP SERPL-MCNC: 8 MG/L
D FARINAE IGE QN: <0.1 KUA/L
D PTERONYSS IGE QN: <0.1 KUA/L
DEPRECATED A ALTERNATA IGE RAST QL: 0
DEPRECATED A ALTERNATA IGE RAST QL: 0
DEPRECATED A FUMIGATUS IGE RAST QL: 0
DEPRECATED A FUMIGATUS IGE RAST QL: 0
DEPRECATED BERMUDA GRASS IGE RAST QL: 0
DEPRECATED BOXELDER IGE RAST QL: 0
DEPRECATED C HERBARUM IGE RAST QL: 0
DEPRECATED C HERBARUM IGE RAST QL: 0
DEPRECATED CAT DANDER IGE RAST QL: 0
DEPRECATED COMMON PIGWEED IGE RAST QL: 0
DEPRECATED COMMON RAGWEED IGE RAST QL: 0
DEPRECATED COTTONWOOD IGE RAST QL: 0
DEPRECATED D FARINAE IGE RAST QL: 0
DEPRECATED D PTERONYSS IGE RAST QL: 0
DEPRECATED DOG DANDER IGE RAST QL: 0
DEPRECATED GOOSEFOOT IGE RAST QL: 0
DEPRECATED LONDON PLANE IGE RAST QL: 0
DEPRECATED MOUSE EPITH IGE RAST QL: 0
DEPRECATED MOUSE URINE PROT IGE RAST QL: 0
DEPRECATED MUGWORT IGE RAST QL: 0
DEPRECATED P NOTATUM IGE RAST QL: 0
DEPRECATED P NOTATUM IGE RAST QL: 0
DEPRECATED RED CEDAR IGE RAST QL: 0
DEPRECATED ROACH IGE RAST QL: 0
DEPRECATED S ROSTRATA IGE RAST QL: 0
DEPRECATED SHEEP SORREL IGE RAST QL: 0
DEPRECATED SILVER BIRCH IGE RAST QL: 0
DEPRECATED TIMOTHY IGE RAST QL: 0
DEPRECATED WHITE ASH IGE RAST QL: 0
DEPRECATED WHITE OAK IGE RAST QL: 0
DOG DANDER IGE QN: <0.1 KUA/L
EOSINOPHIL # BLD AUTO: 0.08 K/UL
EOSINOPHIL NFR BLD AUTO: 1.2 %
FERRITIN SERPL-MCNC: 122 NG/ML
GLUCOSE QUALITATIVE U: NEGATIVE
GLUCOSE SERPL-MCNC: 88 MG/DL
GOOSEFOOT IGE QN: <0.1 KUA/L
HCT VFR BLD CALC: 42.3 %
HGB BLD-MCNC: 13.2 G/DL
HYALINE CASTS: 0 /LPF
IMM GRANULOCYTES NFR BLD AUTO: 0.2 %
INR PPP: 0.99 RATIO
IRON SATN MFR SERPL: 22 %
IRON SERPL-MCNC: 68 UG/DL
KETONES URINE: NEGATIVE
LEUKOCYTE ESTERASE URINE: NEGATIVE
LONDON PLANE IGE QN: <0.1 KUA/L
LYMPHOCYTES # BLD AUTO: 2.26 K/UL
LYMPHOCYTES NFR BLD AUTO: 34.9 %
MAN DIFF?: NORMAL
MCHC RBC-ENTMCNC: 30.9 PG
MCHC RBC-ENTMCNC: 31.2 GM/DL
MCV RBC AUTO: 99.1 FL
MICROSCOPIC-UA: NORMAL
MONOCYTES # BLD AUTO: 0.41 K/UL
MONOCYTES NFR BLD AUTO: 6.3 %
MOUSE EPITH IGE QN: <0.1 KUA/L
MOUSE URINE PROT IGE QN: <0.1 KUA/L
MUGWORT IGE QN: <0.1 KUA/L
MULBERRY (T70) CLASS: 0
MULBERRY (T70) CONC: <0.1 KUA/L
NEUTROPHILS # BLD AUTO: 3.67 K/UL
NEUTROPHILS NFR BLD AUTO: 56.8 %
NITRITE URINE: NEGATIVE
P NOTATUM IGE QN: <0.1 KUA/L
P NOTATUM IGE QN: <0.1 KUA/L
PH URINE: 5.5
PLATELET # BLD AUTO: 336 K/UL
POTASSIUM SERPL-SCNC: 3.8 MMOL/L
PROT SERPL-MCNC: 7.4 G/DL
PROTEIN URINE: NEGATIVE
PT BLD: 11.7 SEC
RBC # BLD: 4.27 M/UL
RBC # FLD: 13.5 %
RED BLOOD CELLS URINE: 0 /HPF
RED CEDAR IGE QN: <0.1 KUA/L
ROACH IGE QN: <0.1 KUA/L
S ROSTRATA IGE QN: <0.1 KUA/L
SHEEP SORREL IGE QN: <0.1 KUA/L
SILVER BIRCH IGE QN: <0.1 KUA/L
SODIUM SERPL-SCNC: 141 MMOL/L
SPECIFIC GRAVITY URINE: 1
SQUAMOUS EPITHELIAL CELLS: 0 /HPF
TIBC SERPL-MCNC: 314 UG/DL
TIMOTHY IGE QN: <0.1 KUA/L
TOTAL IGE SMQN RAST: 18 KU/L
TREE ALLERG MIX1 IGE QL: 0
UIBC SERPL-MCNC: 246 UG/DL
UROBILINOGEN URINE: NORMAL
WBC # FLD AUTO: 6.47 K/UL
WHITE ASH IGE QN: <0.1 KUA/L
WHITE BLOOD CELLS URINE: 0 /HPF
WHITE ELM IGE QN: 0
WHITE ELM IGE QN: <0.1 KUA/L
WHITE OAK IGE QN: <0.1 KUA/L

## 2021-06-01 PROCEDURE — 99213 OFFICE O/P EST LOW 20 MIN: CPT | Mod: 95

## 2021-06-01 NOTE — HISTORY OF PRESENT ILLNESS
[FreeTextEntry1] : 66-year-old female with past medical history of intermittent hypertension and hyperlipidemia, ex-smoker and pulmonary nodule s/p lobectomy, SVT on Toprol.  \par \par patient had outpatient cardiac monitor to assess PVC burden found to have 9 episodes of short SVT 10.2 seconds the lowest episode also found a rare PACs and PVCs patient is not on negative chronotropic medication\par \par Since last visit - has rare palpitations. tolerating Toprol \par EKG NSR. \par pt going for neuro surgery for lumbar stenosis.

## 2021-06-01 NOTE — ASSESSMENT
[FreeTextEntry1] : Referred by Dr. Paulson\par \par EKG January 2021 normal sinus rhythm with sinus variation\par \par 66-year-old ex-smoker with pulmonary nodule in for preop evaluation patient \par 2-D echocardiogram  - Jan 2021 - normal \par treadmill stress test - Jan 2021 - normal \par zio svt and pac and pvc < 1 %\par \par April 2021 - \par s/p surgery had stage 1 lung ca\par zio worn 1 run of VT\par 18 runs of SVT in 9 days longest 14 beats. \par \par Tolerating Toprol for SVT. \par \par Cardiac CT  with normal coronaries. \par \par BP controlled\par EKG reviewed - May 2021 - NSR. \par Pt has no cardiac contraindication for planned surgery - recommend continuing Toprol. \par \par \par f/u 6 months. \par \par Followup patient\par Reason patient request contact:\par Conference took place on video conference on [employer]\par Consent was obtained from patient\par Time spent: 25 minutes > 50% of the time in the encounter in both counseling and coordination of care for any or all of the diagnosis submitted\par \par

## 2021-06-02 ENCOUNTER — APPOINTMENT (OUTPATIENT)
Dept: OBGYN | Facility: CLINIC | Age: 66
End: 2021-06-02

## 2021-06-04 ENCOUNTER — APPOINTMENT (OUTPATIENT)
Dept: GERIATRICS | Facility: CLINIC | Age: 66
End: 2021-06-04
Payer: COMMERCIAL

## 2021-06-04 VITALS
WEIGHT: 161 LBS | OXYGEN SATURATION: 97 % | BODY MASS INDEX: 26.38 KG/M2 | DIASTOLIC BLOOD PRESSURE: 70 MMHG | TEMPERATURE: 98.4 F | HEART RATE: 80 BPM | SYSTOLIC BLOOD PRESSURE: 98 MMHG

## 2021-06-04 PROCEDURE — 99214 OFFICE O/P EST MOD 30 MIN: CPT

## 2021-06-04 PROCEDURE — 99072 ADDL SUPL MATRL&STAF TM PHE: CPT

## 2021-06-13 NOTE — ASSESSMENT
[Procedure Intermediate Risk] : the procedure risk is intermediate [Patient Low Risk] : the patient is a low surgical risk [Optimized for Surgery] : the patient is optimized for surgery [As per surgery] : as per surgery [FreeTextEntry3] : pt aware to hold all NSAID's,  [FreeTextEntry1] : Ms. Finn presents today for  pre-operative assessment pending laminectomy with Dr Muñoz on 6/17. \par As above, patient has been seen by Dr Paulson from Pulmonary as well as Dr Gifford from Cardiology. She has been started on Anoro and Toprol respectively and is tolerating both. She is aware to continue both of these medications up to and through her surgery. If coughing or SOB post -op it has been recommended that she be started on Duonebs post-op. \par At this point in time she may proceed with her surgery at an acceptable risk. All testing has been reviewed.

## 2021-06-13 NOTE — PHYSICAL EXAM
[General Appearance - Alert] : alert [General Appearance - In No Acute Distress] : in no acute distress [General Appearance - Well Nourished] : well nourished [General Appearance - Well Developed] : well developed [General Appearance - Well-Appearing] : healthy appearing [Sclera] : the sclera and conjunctiva were normal [PERRL With Normal Accommodation] : pupils were equal in size, round, and reactive to light [Extraocular Movements] : extraocular movements were intact [Optic Disc Abnormality] : the optic disc were normal in size and color [Outer Ear] : the ears and nose were normal in appearance [Hearing Threshold Finger Rub Not [place]] : hearing was normal [Examination Of The Oral Cavity] : the lips and gums were normal [Both Tympanic Membranes Were Examined] : both tympanic membranes were normal [Nasal Cavity] : the nasal mucosa and septum were normal [Oropharynx] : the oropharynx was normal [Neck Appearance] : the appearance of the neck was normal [Neck Cervical Mass (___cm)] : no neck mass was observed [Jugular Venous Distention Increased] : there was no jugular-venous distention [Thyroid Diffuse Enlargement] : the thyroid was not enlarged [Thyroid Nodule] : there were no palpable thyroid nodules [Respiration, Rhythm And Depth] : normal respiratory rhythm and effort [Exaggerated Use Of Accessory Muscles For Inspiration] : no accessory muscle use [Auscultation Breath Sounds / Voice Sounds] : lungs were clear to auscultation bilaterally [Chest Palpation] : palpation of the chest revealed no abnormalities [Lungs Percussion] : the lungs were normal to percussion [Apical Impulse] : the apical impulse was normal [Heart Rate And Rhythm] : heart rate was normal and rhythm regular [Heart Sounds] : normal S1 and S2 [Heart Sounds Gallop] : no gallops [Murmurs] : no murmurs [Heart Sounds Pericardial Friction Rub] : no pericardial rub [Arterial Pulses Carotid] : carotid pulses were normal with no bruits [Abdominal Aorta] : the abdominal aorta was normal [Arterial Pulses Femoral] : femoral pulses were normal without bruits [Full Pulse] : the pedal pulses are present [Edema] : there was no peripheral edema [Bowel Sounds] : normal bowel sounds [Abdomen Soft] : soft [Abdomen Tenderness] : non-tender [Abdomen Hernia] : no hernia was discovered [Cervical Lymph Nodes Enlarged Posterior Bilaterally] : posterior cervical [Supraclavicular Lymph Nodes Enlarged Bilaterally] : supraclavicular [Axillary Lymph Nodes Enlarged Bilaterally] : axillary [No CVA Tenderness] : no ~M costovertebral angle tenderness [No Spinal Tenderness] : no spinal tenderness [Abnormal Walk] : normal gait [Nail Clubbing] : no clubbing  or cyanosis of the fingernails [Motor Tone] : muscle strength and tone were normal [Skin Color & Pigmentation] : normal skin color and pigmentation [Skin Turgor] : normal skin turgor [] : no rash [Skin Lesions] : no skin lesions [Cranial Nerves] : cranial nerves 2-12 were intact [Deep Tendon Reflexes (DTR)] : deep tendon reflexes were 2+ and symmetric [Sensation] : the sensory exam was normal to light touch and pinprick [Motor Exam] : the motor exam was normal [No Focal Deficits] : no focal deficits [Oriented To Time, Place, And Person] : oriented to person, place, and time [Impaired Insight] : insight and judgment were intact [Affect] : the affect was normal [Mood] : the mood was normal [Memory Recent] : recent memory was not impaired [Memory Remote] : remote memory was not impaired

## 2021-06-13 NOTE — REVIEW OF SYSTEMS
[As Noted in HPI] : as noted in HPI [Cough] : cough [Dysuria] : dysuria [Frequency] : frequency [Joint Pain] : joint pain [Muscle Pain] : muscle pain [Negative] : Heme/Lymph [FreeTextEntry9] : improved on steroids, cont with leg cramps at night

## 2021-06-13 NOTE — HISTORY OF PRESENT ILLNESS
[Preoperative Visit] : for a medical evaluation prior to surgery [Scheduled Procedure ___] : a [unfilled] [Date of Surgery ___] : on [unfilled] [Surgeon Name ___] : surgeon: [unfilled] [Good] : Good [Cough] : cough [Dysuria] : dysuria [Cardiovascular Disease] : cardiovascular disease [Pulmonary Disease] : pulmonary disease [Prior Anesthesia] : Prior anesthesia [Electrocardiogram] : ~T an ECG ~C was performed [Echocardiogram] : ~T an echocardiogram ~C was performed [Cardiovascular Stress Test] : a cardiac stress test ~T ~C was performed [Fever] : no fever [Chills] : no chills [Fatigue] : no fatigue [Chest Pain] : no chest pain [Dyspnea] : no dyspnea [Urinary Frequency] : no urinary frequency [Nausea] : no nausea [Vomiting] : no vomiting [Diarrhea] : no diarrhea [Abdominal Pain] : no abdominal pain [Easy Bruising] : no easy bruising [Lower Extremity Swelling] : no lower extremity swelling [Poor Exercise Tolerance] : no poor exercise tolerance [Diabetes] : no diabetes [Anti-Platelet Agents] : no anti-platelet agents [Nicotine Dependence] : no nicotine dependence [Renal Disease] : no renal disease [Alcohol Use] : no  alcohol use [GI Disease] : no gastrointestinal disease [Sleep Apnea] : no sleep apnea [Thromboembolic Problems] : no thromboembolic problems [Frequent use of NSAIDs] : no use of NSAIDs [Transfusion Reaction] : no transfusion reaction [Impaired Immunity] : no impaired immunity [Steroid Use in Last 6 Months] : no steroid use in the last six months [Frequent Aspirin Use] : no frequent aspirin use [Prev Anesthesia Reaction] : no previous anesthesia reaction [Anesthesia Reaction] : no anesthesia reaction [Sudden Death] : no sudden death [Clotting Disorder] : no clotting disorder [Bleeding Disorder] : no bleeding disorder [FreeTextEntry1] : Ms. Finn presents today in order to complete a pre-operative assessment in anticipation of L3-5 laminectomy on 6/17/2021.\par \par Ms Finn is s/p recent RUL VATS wedge resection secondary to stage I lung cancer. CT lungs revealed mucus plugging-she was started on Anoro in attempt to minimize plugging which may possibly be due to COPD. She states that she cont to experience a mild cough. \par \par In addition she underwent cardiac work up with Dr Gifford-ECHO, stress test, ziclari-found to have SVT, started on Toprol which she is tolerating well.

## 2021-06-14 ENCOUNTER — RESULT REVIEW (OUTPATIENT)
Age: 66
End: 2021-06-14

## 2021-06-17 ENCOUNTER — RESULT REVIEW (OUTPATIENT)
Age: 66
End: 2021-06-17

## 2021-06-17 ENCOUNTER — APPOINTMENT (OUTPATIENT)
Dept: NEUROSURGERY | Facility: HOSPITAL | Age: 66
End: 2021-06-17

## 2021-06-21 DIAGNOSIS — R05 COUGH: ICD-10-CM

## 2021-06-23 ENCOUNTER — NON-APPOINTMENT (OUTPATIENT)
Age: 66
End: 2021-06-23

## 2021-06-25 ENCOUNTER — NON-APPOINTMENT (OUTPATIENT)
Age: 66
End: 2021-06-25

## 2021-06-30 ENCOUNTER — APPOINTMENT (OUTPATIENT)
Dept: NEUROSURGERY | Facility: CLINIC | Age: 66
End: 2021-06-30
Payer: COMMERCIAL

## 2021-06-30 ENCOUNTER — APPOINTMENT (OUTPATIENT)
Dept: OBGYN | Facility: CLINIC | Age: 66
End: 2021-06-30
Payer: COMMERCIAL

## 2021-06-30 VITALS
BODY MASS INDEX: 25.52 KG/M2 | TEMPERATURE: 97.2 F | WEIGHT: 155 LBS | DIASTOLIC BLOOD PRESSURE: 71 MMHG | HEIGHT: 65.5 IN | SYSTOLIC BLOOD PRESSURE: 95 MMHG | HEART RATE: 75 BPM

## 2021-06-30 VITALS
DIASTOLIC BLOOD PRESSURE: 74 MMHG | SYSTOLIC BLOOD PRESSURE: 122 MMHG | BODY MASS INDEX: 25.66 KG/M2 | WEIGHT: 154 LBS | HEIGHT: 65 IN

## 2021-06-30 DIAGNOSIS — Z11.51 ENCOUNTER FOR SCREENING FOR HUMAN PAPILLOMAVIRUS (HPV): ICD-10-CM

## 2021-06-30 DIAGNOSIS — Z78.0 ASYMPTOMATIC MENOPAUSAL STATE: ICD-10-CM

## 2021-06-30 DIAGNOSIS — Z12.31 ENCOUNTER FOR SCREENING MAMMOGRAM FOR MALIGNANT NEOPLASM OF BREAST: ICD-10-CM

## 2021-06-30 DIAGNOSIS — Z12.11 ENCOUNTER FOR SCREENING FOR MALIGNANT NEOPLASM OF COLON: ICD-10-CM

## 2021-06-30 PROCEDURE — 99072 ADDL SUPL MATRL&STAF TM PHE: CPT

## 2021-06-30 PROCEDURE — 99024 POSTOP FOLLOW-UP VISIT: CPT

## 2021-06-30 PROCEDURE — 99396 PREV VISIT EST AGE 40-64: CPT

## 2021-06-30 NOTE — REASON FOR VISIT
[Follow-Up: _____] : a [unfilled] follow-up visit [FreeTextEntry1] : 6/30/21: 66 year female with a PMH of HTN, HLD, ex-smoker, paroxysmal SVT, Hx Pulmonary nodule s/p VATS 2/2021-path adenocarcinoma in situ, GERD, Lumbar radiculopathy, now s/p L3-L5 laminectomy, left L5-S1 microdisectomy on 6/17/21. She presents to clinic for post operative follow up visit. She reports that she has left sided hip pain that radiates down her left mid thigh. She describes this pain similar to the one she had prior to surgery however it is not as severe as it use to be. In addition, she also complains of left foot numbness and that it feels cold. \par She denies: headaches, chest pain, shortness of breath, weakness in extremities. \par \par \par \par 4/22/21: Huma comes to the office today for neurosurgical evaluation, imaging review for neurogenic claudication from lumbar stenosis. She continues to complain of low back pain radiating to bilateral lower extremities worse while walking. She also complains of weakness and heaviness of legs. Her pain is worse in the left leg, begins in the left hip and radiates along the posterolateral aspect of the leg. She denies numbness,tingling, bladder or bowel incontinence. She denies any new symptoms since her prior visit.\par \par 4/7/21 Ms Finn is a 66 year female with a PMH of HTN, HLD, ex-smoker, paroxysmal SVT, Hx Pulmonary nodule s/p VATS 2/2021-path adenocarcinoma in situ, GERD, Lumbar radiculopathy seen by Dr. Rodríguez, Pain management who presents to the office today for neurosurgical consultation due to long standing low back pain radiating to bilateral lower extremities, worse while walking. She complains of weakness and heaviness in the legs. Specifically, her pain is worse in the left leg. It begins in the left hip and radiates down to the left foot, specifically along the posterolateral aspect of the leg. She denies numbness/tingling. Denies bowel/bladder incontinence. She has undergone epidural steroid injections, trigger point injections, and physical therapy in the past with no alleviation of her symptoms. Her pain significantly impacts her mobility and her quality of life.\par \par

## 2021-06-30 NOTE — PHYSICAL EXAM
[Clean] : clean [Dry] : dry [No Drainage] : without drainage [Oriented To Time, Place, And Person] : oriented to person, place, and time [Impaired Insight] : insight and judgment were intact [Affect] : the affect was normal [Person] : oriented to person [Place] : oriented to place [Time] : oriented to time [Short Term Intact] : short term memory intact [Remote Intact] : remote memory intact [Span Intact] : the attention span was normal [Concentration Intact] : normal concentrating ability [Fluency] : fluency intact [Comprehension] : comprehension intact [Current Events] : adequate knowledge of current events [Past History] : adequate knowledge of personal past history [Vocabulary] : adequate range of vocabulary [Cranial Nerves Optic (II)] : visual acuity intact bilaterally,  pupils equal round and reactive to light [Cranial Nerves Oculomotor (III)] : extraocular motion intact [Cranial Nerves Trigeminal (V)] : facial sensation intact symmetrically [Cranial Nerves Facial (VII)] : face symmetrical [Cranial Nerves Vestibulocochlear (VIII)] : hearing was intact bilaterally [Cranial Nerves Glossopharyngeal (IX)] : tongue and palate midline [Cranial Nerves Accessory (XI - Cranial And Spinal)] : head turning and shoulder shrug symmetric [Cranial Nerves Hypoglossal (XII)] : there was no tongue deviation with protrusion [Motor Tone] : muscle tone was normal in all four extremities [Motor Strength] : muscle strength was normal in all four extremities [No Muscle Atrophy] : normal bulk in all four extremities [Sensation Tactile Decrease] : light touch was intact [Abnormal Walk] : normal gait [Balance] : balance was intact [2+] : Patella left 2+ [FreeTextEntry1] : lower back. [FreeTextEntry6] : She had one staple removed from hemovac site.  [Past-pointing] : there was no past-pointing [Tremor] : no tremor present

## 2021-06-30 NOTE — ASSESSMENT
[FreeTextEntry1] : Assessment: 66 year female with a PMH of HTN, HLD, ex-smoker, paroxysmal SVT, Hx Pulmonary nodule s/p VATS 2/2021-path adenocarcinoma in situ, GERD, Lumbar radiculopathy, now s/p L3-L5 laminectomy, left L5-S1 microdisectomy on 6/17/21, presents to clinic for post-op follow up visit\par \par \par \par Plan:\par Ms. Finn is doing well during her post operative appointment. She does have complaints of left foot numbness/coldness however her foot was warm to the touch, pulses intact. Provided patient education that her pain is likely secondary from inflammation due to surgery and will likely get better in 2-3 months. Lastly, I recommend she follow up with Dr. Rodríguez for pain management.\par \par She will follow up in our clinic in 6 weeks . The patient understands the plan of care and is in agreement.  All questions answered to patient satisfaction.\par

## 2021-07-08 PROBLEM — Z11.51 SCREENING FOR HPV (HUMAN PAPILLOMAVIRUS): Status: ACTIVE | Noted: 2019-05-24

## 2021-07-08 NOTE — HISTORY OF PRESENT ILLNESS
[FreeTextEntry1] : 66 y o P1 female presents for routine annual GYN care\par Denies current GYN care\par She was diagnosed with lung carcinoma in 2/2021 and is s/p surgery\par Also s/p back surgery for herniated discs\par Reports normal bowel and bladder function\par Postmenopausal w/ mild vaginal dryness\par Last pap 7/2020 NILM HPV negative\par Breast imaging 5/2021 Benign BI-RADS 1\par Colonoscopy 2016 normal: repeat in 5 years\par \par \par

## 2021-07-09 ENCOUNTER — RESULT REVIEW (OUTPATIENT)
Age: 66
End: 2021-07-09

## 2021-07-12 ENCOUNTER — APPOINTMENT (OUTPATIENT)
Dept: GERIATRICS | Facility: CLINIC | Age: 66
End: 2021-07-12
Payer: COMMERCIAL

## 2021-07-12 VITALS
DIASTOLIC BLOOD PRESSURE: 80 MMHG | HEART RATE: 77 BPM | WEIGHT: 157 LBS | SYSTOLIC BLOOD PRESSURE: 123 MMHG | OXYGEN SATURATION: 99 % | TEMPERATURE: 97.9 F | BODY MASS INDEX: 26.13 KG/M2

## 2021-07-12 DIAGNOSIS — Z00.00 ENCOUNTER FOR GENERAL ADULT MEDICAL EXAMINATION W/OUT ABNORMAL FINDINGS: ICD-10-CM

## 2021-07-12 PROCEDURE — 99072 ADDL SUPL MATRL&STAF TM PHE: CPT

## 2021-07-12 PROCEDURE — 99214 OFFICE O/P EST MOD 30 MIN: CPT

## 2021-07-12 PROCEDURE — 99396 PREV VISIT EST AGE 40-64: CPT

## 2021-07-13 ENCOUNTER — NON-APPOINTMENT (OUTPATIENT)
Age: 66
End: 2021-07-13

## 2021-07-18 ENCOUNTER — RESULT REVIEW (OUTPATIENT)
Age: 66
End: 2021-07-18

## 2021-07-19 ENCOUNTER — APPOINTMENT (OUTPATIENT)
Dept: GASTROENTEROLOGY | Facility: HOSPITAL | Age: 66
End: 2021-07-19

## 2021-07-19 PROBLEM — Z00.00 ENCOUNTER FOR PREVENTIVE HEALTH EXAMINATION: Status: ACTIVE | Noted: 2017-06-07

## 2021-07-19 PROBLEM — Z00.00 ENCOUNTER FOR PREVENTIVE HEALTH EXAMINATION: Status: ACTIVE | Noted: 2021-07-19

## 2021-07-19 NOTE — HISTORY OF PRESENT ILLNESS
[FreeTextEntry1] : annual visit [de-identified] : doing well s/p L3-5 laminectomy and L5-S1 micrdiscectomy on 6/17 with Dr Muñoz, still some pain left lateral thigh but much improved, PT   2-3x/week,\par  also s/p VATS 2/21-adenoca in situ\par \par s/p cardiology visit pre-op with Dr Gifford, started on bblocker dueto finding of SVT, PVC on outpt monitoring, tolerating well, f/u diue about 5 months from now\par \par annual GYN visit completed-Dr Carrion June 30\par \par Seen by Dr Paulson, chronic cough, possibe COPD, started on Anoro, pt states cough possibly improved but continues to be present, less NOWAK, also f/u for adenoca in situ, no further treatment indicated, will have f/u CT as per Dr Paulson\par \par

## 2021-07-19 NOTE — HEALTH RISK ASSESSMENT
[No falls in past year] : Patient reported no falls in the past year [0] : 2) Feeling down, depressed, or hopeless: Not at all (0) [Employed] : employed [Fully functional (bathing, dressing, toileting, transferring, walking, feeding)] : Fully functional (bathing, dressing, toileting, transferring, walking, feeding) [Fully functional (using the telephone, shopping, preparing meals, housekeeping, doing laundry, using] : Fully functional and needs no help or supervision to perform IADLs (using the telephone, shopping, preparing meals, housekeeping, doing laundry, using transportation, managing medications and managing finances) [Smoke Detector] : smoke detector [Carbon Monoxide Detector] : carbon monoxide detector [Seat Belt] :  uses seat belt [Intercurrent hospitalizations] : was admitted to the hospital  [No] : No [Patient reported mammogram was normal] : Patient reported mammogram was normal [Patient reported PAP Smear was normal] : Patient reported PAP Smear was normal [Alone] : lives alone [Single] : single [Feels Safe at Home] : Feels safe at home [] : No [[id number]] : 0 [Change in mental status noted] : No change in mental status noted [Reports changes in hearing] : Reports no changes in hearing [Reports changes in dental health] : Reports no changes in dental health [MammogramDate] : 05/2021 [PapSmearDate] : 6/30/2021 [ColonoscopyDate] : 12/19/2016 [ColonoscopyComments] : due 7/19/2021 [FreeTextEntry2] :  [de-identified] : [employer]

## 2021-07-19 NOTE — PHYSICAL EXAM
[General Appearance - Alert] : alert [General Appearance - In No Acute Distress] : in no acute distress [General Appearance - Well Nourished] : well nourished [General Appearance - Well Developed] : well developed [General Appearance - Well-Appearing] : healthy appearing [Sclera] : the sclera and conjunctiva were normal [PERRL With Normal Accommodation] : pupils were equal in size, round, and reactive to light [Extraocular Movements] : extraocular movements were intact [Optic Disc Abnormality] : the optic disc were normal in size and color [Outer Ear] : the ears and nose were normal in appearance [Hearing Threshold Finger Rub Not [place]] : hearing was normal [Examination Of The Oral Cavity] : the lips and gums were normal [Both Tympanic Membranes Were Examined] : both tympanic membranes were normal [Nasal Cavity] : the nasal mucosa and septum were normal [Oropharynx] : the oropharynx was normal [Neck Appearance] : the appearance of the neck was normal [Neck Cervical Mass (___cm)] : no neck mass was observed [Jugular Venous Distention Increased] : there was no jugular-venous distention [Thyroid Diffuse Enlargement] : the thyroid was not enlarged [Thyroid Nodule] : there were no palpable thyroid nodules [Respiration, Rhythm And Depth] : normal respiratory rhythm and effort [Exaggerated Use Of Accessory Muscles For Inspiration] : no accessory muscle use [Auscultation Breath Sounds / Voice Sounds] : lungs were clear to auscultation bilaterally [Chest Palpation] : palpation of the chest revealed no abnormalities [Lungs Percussion] : the lungs were normal to percussion [Apical Impulse] : the apical impulse was normal [Heart Rate And Rhythm] : heart rate was normal and rhythm regular [Heart Sounds] : normal S1 and S2 [Heart Sounds Gallop] : no gallops [Murmurs] : no murmurs [Heart Sounds Pericardial Friction Rub] : no pericardial rub [Arterial Pulses Carotid] : carotid pulses were normal with no bruits [Abdominal Aorta] : the abdominal aorta was normal [Arterial Pulses Femoral] : femoral pulses were normal without bruits [Full Pulse] : the pedal pulses are present [Edema] : there was no peripheral edema [Bowel Sounds] : normal bowel sounds [Abdomen Soft] : soft [Abdomen Tenderness] : non-tender [Abdomen Hernia] : no hernia was discovered [Cervical Lymph Nodes Enlarged Posterior Bilaterally] : posterior cervical [Supraclavicular Lymph Nodes Enlarged Bilaterally] : supraclavicular [Axillary Lymph Nodes Enlarged Bilaterally] : axillary [No CVA Tenderness] : no ~M costovertebral angle tenderness [No Spinal Tenderness] : no spinal tenderness [Abnormal Walk] : normal gait [Nail Clubbing] : no clubbing  or cyanosis of the fingernails [Motor Tone] : muscle strength and tone were normal [Skin Color & Pigmentation] : normal skin color and pigmentation [Skin Turgor] : normal skin turgor [] : no rash [Skin Lesions] : no skin lesions [Cranial Nerves] : cranial nerves 2-12 were intact [Deep Tendon Reflexes (DTR)] : deep tendon reflexes were 2+ and symmetric [Sensation] : the sensory exam was normal to light touch and pinprick [Motor Exam] : the motor exam was normal [No Focal Deficits] : no focal deficits [Oriented To Time, Place, And Person] : oriented to person, place, and time [Impaired Insight] : insight and judgment were intact [Affect] : the affect was normal [Mood] : the mood was normal [Memory Recent] : recent memory was not impaired [Memory Remote] : remote memory was not impaired [FreeTextEntry1] : incision lower back healing very well, no erythema, dry and intact

## 2021-07-21 ENCOUNTER — NON-APPOINTMENT (OUTPATIENT)
Age: 66
End: 2021-07-21

## 2021-07-23 ENCOUNTER — APPOINTMENT (OUTPATIENT)
Dept: GERIATRICS | Facility: CLINIC | Age: 66
End: 2021-07-23
Payer: COMMERCIAL

## 2021-07-23 PROCEDURE — 90750 HZV VACC RECOMBINANT IM: CPT

## 2021-07-23 PROCEDURE — 90471 IMMUNIZATION ADMIN: CPT

## 2021-07-23 PROCEDURE — 99072 ADDL SUPL MATRL&STAF TM PHE: CPT

## 2021-08-02 ENCOUNTER — RESULT REVIEW (OUTPATIENT)
Age: 66
End: 2021-08-02

## 2021-08-11 ENCOUNTER — APPOINTMENT (OUTPATIENT)
Dept: NEUROSURGERY | Facility: CLINIC | Age: 66
End: 2021-08-11
Payer: COMMERCIAL

## 2021-08-11 VITALS
WEIGHT: 154 LBS | HEIGHT: 65.5 IN | DIASTOLIC BLOOD PRESSURE: 77 MMHG | SYSTOLIC BLOOD PRESSURE: 110 MMHG | HEART RATE: 80 BPM | TEMPERATURE: 97.7 F | BODY MASS INDEX: 25.35 KG/M2

## 2021-08-11 DIAGNOSIS — G95.19 SPINAL STENOSIS, LUMBOSACRAL REGION: ICD-10-CM

## 2021-08-11 DIAGNOSIS — M48.07 SPINAL STENOSIS, LUMBOSACRAL REGION: ICD-10-CM

## 2021-08-11 PROCEDURE — 99024 POSTOP FOLLOW-UP VISIT: CPT

## 2021-08-11 NOTE — ASSESSMENT
[FreeTextEntry1] : Lumbosacral stenosis with neurogenic claudication (724.03) (M48.07,G95.19)\par \par Assessment: 66 year female with a PMH of HTN, HLD, ex-smoker, paroxysmal SVT, Hx Pulmonary nodule s/p VATS 2/2021-path adenocarcinoma in situ, GERD, Lumbar radiculopathy, now s/p L3-L5 laminectomy, left L5-S1 microdisectomy on 6/17/21, presents to clinic for post-op follow up visit\par \par \par \par Plan:\par Ms. Finn is doing well during her post operative appointment. She should continue physical therapy. I provided a work letter to her stating she can return to work. She will follow up with us in clinic in 6 months. The patient understands the plan of care and is in agreement.  All questions answered to patient satisfaction.\par \par

## 2021-08-11 NOTE — PHYSICAL EXAM
[General Appearance - Alert] : alert [General Appearance - In No Acute Distress] : in no acute distress [FreeTextEntry1] : clean, dry, intact

## 2021-08-19 ENCOUNTER — RESULT REVIEW (OUTPATIENT)
Age: 66
End: 2021-08-19

## 2021-08-27 LAB
CYTOLOGY CVX/VAG DOC THIN PREP: ABNORMAL
HPV HIGH+LOW RISK DNA PNL CVX: NOT DETECTED

## 2021-09-29 ENCOUNTER — APPOINTMENT (OUTPATIENT)
Dept: PULMONOLOGY | Facility: CLINIC | Age: 66
End: 2021-09-29
Payer: COMMERCIAL

## 2021-09-29 VITALS
RESPIRATION RATE: 18 BRPM | DIASTOLIC BLOOD PRESSURE: 70 MMHG | SYSTOLIC BLOOD PRESSURE: 125 MMHG | OXYGEN SATURATION: 97 % | HEART RATE: 73 BPM

## 2021-09-29 PROCEDURE — G0009: CPT

## 2021-09-29 PROCEDURE — 90732 PPSV23 VACC 2 YRS+ SUBQ/IM: CPT

## 2021-11-30 ENCOUNTER — APPOINTMENT (OUTPATIENT)
Dept: RHEUMATOLOGY | Facility: CLINIC | Age: 66
End: 2021-11-30
Payer: COMMERCIAL

## 2021-11-30 ENCOUNTER — APPOINTMENT (OUTPATIENT)
Dept: RHEUMATOLOGY | Facility: CLINIC | Age: 66
End: 2021-11-30

## 2021-11-30 VITALS
HEART RATE: 76 BPM | HEIGHT: 66 IN | OXYGEN SATURATION: 98 % | DIASTOLIC BLOOD PRESSURE: 78 MMHG | SYSTOLIC BLOOD PRESSURE: 125 MMHG | WEIGHT: 160 LBS | BODY MASS INDEX: 25.71 KG/M2

## 2021-11-30 DIAGNOSIS — M16.0 BILATERAL PRIMARY OSTEOARTHRITIS OF HIP: ICD-10-CM

## 2021-11-30 PROCEDURE — 99214 OFFICE O/P EST MOD 30 MIN: CPT

## 2021-12-02 ENCOUNTER — APPOINTMENT (OUTPATIENT)
Dept: CARDIOLOGY | Facility: CLINIC | Age: 66
End: 2021-12-02

## 2021-12-03 PROBLEM — M16.0 OSTEOARTHRITIS OF HIPS, BILATERAL: Status: ACTIVE | Noted: 2020-11-04

## 2021-12-03 NOTE — HISTORY OF PRESENT ILLNESS
[FreeTextEntry1] : She had laminectomy L3-L5 on 6/17/21 and pain in her back and leg has completely resolved.  Post-op was complicated by spinal fluid leak.  She went back to work in August.  \par \par The pain in her shoulders has recurred and she feels very tired.  SHe needs shoulder surgery.  She does not sleep well at night bc of the shoulder pain.\par \par She denies headache, blurry vision, scalp and jaw tenderness.\par \par 3 weeks ago she had an ocular migraine where she sees flashing light.  Sometimes she gets a small headache afterwards but not usually.

## 2021-12-09 ENCOUNTER — APPOINTMENT (OUTPATIENT)
Dept: PULMONOLOGY | Facility: CLINIC | Age: 66
End: 2021-12-09
Payer: COMMERCIAL

## 2021-12-09 PROCEDURE — 90750 HZV VACC RECOMBINANT IM: CPT

## 2021-12-09 PROCEDURE — 90471 IMMUNIZATION ADMIN: CPT

## 2021-12-14 ENCOUNTER — APPOINTMENT (OUTPATIENT)
Dept: CARDIOLOGY | Facility: CLINIC | Age: 66
End: 2021-12-14
Payer: COMMERCIAL

## 2021-12-14 ENCOUNTER — NON-APPOINTMENT (OUTPATIENT)
Age: 66
End: 2021-12-14

## 2021-12-14 VITALS
DIASTOLIC BLOOD PRESSURE: 80 MMHG | WEIGHT: 160 LBS | HEART RATE: 67 BPM | SYSTOLIC BLOOD PRESSURE: 122 MMHG | BODY MASS INDEX: 26.66 KG/M2 | HEIGHT: 65 IN

## 2021-12-14 DIAGNOSIS — I47.1 SUPRAVENTRICULAR TACHYCARDIA: ICD-10-CM

## 2021-12-14 PROCEDURE — 99213 OFFICE O/P EST LOW 20 MIN: CPT

## 2021-12-14 PROCEDURE — 93000 ELECTROCARDIOGRAM COMPLETE: CPT

## 2021-12-14 RX ORDER — ACETAMINOPHEN 325 MG/1
325 TABLET, FILM COATED ORAL
Refills: 0 | Status: DISCONTINUED | COMMUNITY
End: 2021-12-14

## 2021-12-14 RX ORDER — HYDROCODONE BITARTRATE AND HOMATROPINE METHYLBROMIDE 5; 1.5 MG/5ML; MG/5ML
5-1.5 SYRUP ORAL
Qty: 300 | Refills: 0 | Status: DISCONTINUED | COMMUNITY
Start: 2021-06-21 | End: 2021-12-14

## 2021-12-14 RX ORDER — NAPROXEN SODIUM 220 MG
220 TABLET ORAL
Refills: 0 | Status: DISCONTINUED | COMMUNITY
End: 2021-12-14

## 2021-12-14 RX ORDER — ZOSTER VACCINE RECOMBINANT, ADJUVANTED 50 MCG/0.5
50 KIT INTRAMUSCULAR
Qty: 1 | Refills: 0 | Status: DISCONTINUED | COMMUNITY
Start: 2021-07-12 | End: 2021-12-14

## 2021-12-14 RX ORDER — ONDANSETRON 8 MG/1
8 TABLET, ORALLY DISINTEGRATING ORAL EVERY 8 HOURS
Qty: 30 | Refills: 1 | Status: DISCONTINUED | COMMUNITY
Start: 2021-01-27 | End: 2021-12-14

## 2021-12-14 RX ORDER — DICLOFENAC SODIUM 10 MG/G
1 GEL TOPICAL
Qty: 3 | Refills: 3 | Status: DISCONTINUED | COMMUNITY
Start: 2020-10-15 | End: 2021-12-14

## 2021-12-14 RX ORDER — FLUCONAZOLE 150 MG/1
150 TABLET ORAL
Qty: 15 | Refills: 1 | Status: DISCONTINUED | COMMUNITY
Start: 2019-05-24 | End: 2021-12-14

## 2021-12-14 RX ORDER — NITROFURANTOIN (MONOHYDRATE/MACROCRYSTALS) 25; 75 MG/1; MG/1
100 CAPSULE ORAL TWICE DAILY
Qty: 10 | Refills: 0 | Status: DISCONTINUED | COMMUNITY
Start: 2021-01-25 | End: 2021-12-14

## 2021-12-14 RX ORDER — OXYCODONE AND ACETAMINOPHEN 5; 325 MG/1; MG/1
5-325 TABLET ORAL
Qty: 28 | Refills: 0 | Status: DISCONTINUED | COMMUNITY
Start: 2021-02-19 | End: 2021-12-14

## 2021-12-14 RX ORDER — ESOMEPRAZOLE MAGNESIUM 40 MG/1
40 CAPSULE, DELAYED RELEASE ORAL
Qty: 90 | Refills: 1 | Status: DISCONTINUED | COMMUNITY
Start: 2019-01-21 | End: 2021-12-14

## 2021-12-14 RX ORDER — ZOSTER VACCINE RECOMBINANT, ADJUVANTED 50 MCG/0.5
50 KIT INTRAMUSCULAR
Qty: 1 | Refills: 0 | Status: DISCONTINUED | COMMUNITY
Start: 2021-09-10 | End: 2021-12-14

## 2021-12-14 NOTE — ASSESSMENT
[FreeTextEntry1] : Referred by Dr. Paulson\par \par EKG January 2021 normal sinus rhythm with sinus variation\par Dec 2021 - NSR\par \par 66-year-old ex-smoker with pulmonary nodule in for preop evaluation patient \par 2-D echocardiogram  - Jan 2021 - normal \par treadmill stress test - Jan 2021 - normal \par zio svt and pac and pvc < 1 %\par \par April 2021 - \par s/p surgery had stage 1 lung ca\par zio worn 1 run of VT\par 18 runs of SVT in 9 days longest 14 beats. \par \par Tolerating Toprol for SVT. \par \par Cardiac CT  with normal coronaries. \par \par BP controlled\par EKG reviewed - May 2021 - NSR. \par \par Dec - NSR\par recommend continuing Toprol. \par \par f/u 6 months. \par \par \par \par

## 2021-12-15 ENCOUNTER — RESULT REVIEW (OUTPATIENT)
Age: 66
End: 2021-12-15

## 2021-12-15 NOTE — HISTORY OF PRESENT ILLNESS
[de-identified] : Ms Finn comes to the office today for neurosurgical evaluation, imaging review for neurogenic claudication from lumbar stenosis. She continues to complain of low back pain radiating to bilateral lower extremities worse while walking. She also complains of weakness and heaviness of legs.  Her pain is worse in the left leg, begins in the left hip and radiates along the posterolateral aspect of the leg. She denies numbness,tingling, bladder or bowel incontinence.  She denies any new symptoms since her prior visit.\par \par 4/7/21 Ms Finn is a 66 year female with a PMH of HTN, HLD, ex-smoker, paroxysmal SVT, Hx Pulmonary nodule s/p VATS 2/2021-path adenocarcinoma in situ, GERD, Lumbar radiculopathy seen by Dr. Rodríguez, Pain management who presents to the office today for neurosurgical consultation due to long standing low back pain radiating to bilateral lower extremities, worse while walking.  She complains of weakness and heaviness in the legs.  Specifically, her pain is worse in the left leg.  It begins in the left hip and radiates down to the left foot, specifically along the posterolateral aspect of the leg.  She denies numbness/tingling.  Denies bowel/bladder incontinence.  She has undergone epidural steroid injections, trigger point injections, and physical therapy in the past with no alleviation of her symptoms.  Her pain significantly impacts her mobility and her quality of life.\par  The patient appears stated age, fair hygiene, dressed appropriately.  She was more cooperative with the interview.  Avoidant to intense eye contact and irritbale relatedness.  No PMA or PMR, no abnormal movements  Steady gait.  The patient’s speech was fluent, normal volume, rapid.  Mood is "good, proud". Affect is irritable, but more stable, not mood congruent. The patient’s thoughts are linear, goal directed. Denies tyshawn delusional content. Denies AVH. Denies SI/HI. Insight is poor.  Judgment is impaired.  Impulse control is limited to fair.

## 2021-12-20 ENCOUNTER — NON-APPOINTMENT (OUTPATIENT)
Age: 66
End: 2021-12-20

## 2021-12-22 ENCOUNTER — RESULT REVIEW (OUTPATIENT)
Age: 66
End: 2021-12-22

## 2022-01-07 ENCOUNTER — RESULT REVIEW (OUTPATIENT)
Age: 67
End: 2022-01-07

## 2022-01-07 LAB
ALBUMIN MFR SERPL ELPH: 59 %
ALBUMIN SERPL ELPH-MCNC: 4.7 G/DL
ALBUMIN SERPL ELPH-MCNC: 4.8 G/DL
ALBUMIN SERPL-MCNC: 4.4 G/DL
ALBUMIN/GLOB SERPL: 1.4 RATIO
ALP BLD-CCNC: 100 U/L
ALP BLD-CCNC: 98 U/L
ALPHA1 GLOB MFR SERPL ELPH: 3.9 %
ALPHA1 GLOB SERPL ELPH-MCNC: 0.3 G/DL
ALPHA2 GLOB MFR SERPL ELPH: 9.4 %
ALPHA2 GLOB SERPL ELPH-MCNC: 0.7 G/DL
ALT SERPL-CCNC: 13 U/L
ALT SERPL-CCNC: 16 U/L
ANION GAP SERPL CALC-SCNC: 11 MMOL/L
ANION GAP SERPL CALC-SCNC: 12 MMOL/L
AST SERPL-CCNC: 15 U/L
AST SERPL-CCNC: 16 U/L
B-GLOBULIN MFR SERPL ELPH: 12.2 %
B-GLOBULIN SERPL ELPH-MCNC: 0.9 G/DL
BASOPHILS # BLD AUTO: 0.07 K/UL
BASOPHILS # BLD AUTO: 0.07 K/UL
BASOPHILS NFR BLD AUTO: 0.7 %
BASOPHILS NFR BLD AUTO: 1.1 %
BILIRUB SERPL-MCNC: 0.3 MG/DL
BILIRUB SERPL-MCNC: 0.4 MG/DL
BUN SERPL-MCNC: 16 MG/DL
BUN SERPL-MCNC: 16 MG/DL
CALCIUM SERPL-MCNC: 9.4 MG/DL
CALCIUM SERPL-MCNC: 9.8 MG/DL
CHLORIDE SERPL-SCNC: 102 MMOL/L
CHLORIDE SERPL-SCNC: 103 MMOL/L
CO2 SERPL-SCNC: 24 MMOL/L
CO2 SERPL-SCNC: 26 MMOL/L
CREAT SERPL-MCNC: 0.75 MG/DL
CREAT SERPL-MCNC: 0.77 MG/DL
CRP SERPL-MCNC: 7 MG/L
CRP SERPL-MCNC: 7 MG/L
EOSINOPHIL # BLD AUTO: 0.1 K/UL
EOSINOPHIL # BLD AUTO: 0.14 K/UL
EOSINOPHIL NFR BLD AUTO: 1 %
EOSINOPHIL NFR BLD AUTO: 2.3 %
ERYTHROCYTE [SEDIMENTATION RATE] IN BLOOD BY WESTERGREN METHOD: 45 MM/HR
ERYTHROCYTE [SEDIMENTATION RATE] IN BLOOD BY WESTERGREN METHOD: 56 MM/HR
GAMMA GLOB FLD ELPH-MCNC: 1.2 G/DL
GAMMA GLOB MFR SERPL ELPH: 15.5 %
GLUCOSE SERPL-MCNC: 101 MG/DL
GLUCOSE SERPL-MCNC: 95 MG/DL
HCT VFR BLD CALC: 42.4 %
HCT VFR BLD CALC: 43.1 %
HGB BLD-MCNC: 13.2 G/DL
HGB BLD-MCNC: 13.3 G/DL
IMM GRANULOCYTES NFR BLD AUTO: 0.2 %
IMM GRANULOCYTES NFR BLD AUTO: 0.4 %
INTERPRETATION SERPL IEP-IMP: NORMAL
LYMPHOCYTES # BLD AUTO: 2.12 K/UL
LYMPHOCYTES # BLD AUTO: 3.29 K/UL
LYMPHOCYTES NFR BLD AUTO: 34.4 %
LYMPHOCYTES NFR BLD AUTO: 34.4 %
M PROTEIN SPEC IFE-MCNC: NORMAL
MAN DIFF?: NORMAL
MAN DIFF?: NORMAL
MCHC RBC-ENTMCNC: 30.1 PG
MCHC RBC-ENTMCNC: 30.2 PG
MCHC RBC-ENTMCNC: 30.9 GM/DL
MCHC RBC-ENTMCNC: 31.1 GM/DL
MCV RBC AUTO: 96.6 FL
MCV RBC AUTO: 97.7 FL
MONOCYTES # BLD AUTO: 0.51 K/UL
MONOCYTES # BLD AUTO: 0.76 K/UL
MONOCYTES NFR BLD AUTO: 7.9 %
MONOCYTES NFR BLD AUTO: 8.3 %
NEUTROPHILS # BLD AUTO: 3.32 K/UL
NEUTROPHILS # BLD AUTO: 5.31 K/UL
NEUTROPHILS NFR BLD AUTO: 53.7 %
NEUTROPHILS NFR BLD AUTO: 55.6 %
PLATELET # BLD AUTO: 353 K/UL
PLATELET # BLD AUTO: 360 K/UL
POTASSIUM SERPL-SCNC: 4.4 MMOL/L
POTASSIUM SERPL-SCNC: 4.6 MMOL/L
PROT SERPL-MCNC: 7.3 G/DL
PROT SERPL-MCNC: 7.5 G/DL
RBC # BLD: 4.39 M/UL
RBC # BLD: 4.41 M/UL
RBC # FLD: 13.5 %
RBC # FLD: 13.7 %
SODIUM SERPL-SCNC: 139 MMOL/L
SODIUM SERPL-SCNC: 139 MMOL/L
WBC # FLD AUTO: 6.17 K/UL
WBC # FLD AUTO: 9.57 K/UL

## 2022-02-25 DIAGNOSIS — Z12.39 ENCOUNTER FOR OTHER SCREENING FOR MALIGNANT NEOPLASM OF BREAST: ICD-10-CM

## 2022-03-04 LAB
ALBUMIN SERPL ELPH-MCNC: 4.6 G/DL
ALP BLD-CCNC: 94 U/L
ALT SERPL-CCNC: 18 U/L
ANION GAP SERPL CALC-SCNC: 16 MMOL/L
AST SERPL-CCNC: 15 U/L
BASOPHILS # BLD AUTO: 0.07 K/UL
BASOPHILS NFR BLD AUTO: 0.7 %
BILIRUB SERPL-MCNC: 0.3 MG/DL
BUN SERPL-MCNC: 14 MG/DL
CALCIUM SERPL-MCNC: 9.5 MG/DL
CHLORIDE SERPL-SCNC: 101 MMOL/L
CO2 SERPL-SCNC: 22 MMOL/L
CREAT SERPL-MCNC: 0.83 MG/DL
CRP SERPL-MCNC: 5 MG/L
EGFR: 79 ML/MIN/1.73M2
EOSINOPHIL # BLD AUTO: 0.04 K/UL
EOSINOPHIL NFR BLD AUTO: 0.4 %
ERYTHROCYTE [SEDIMENTATION RATE] IN BLOOD BY WESTERGREN METHOD: 39 MM/HR
GLUCOSE SERPL-MCNC: 88 MG/DL
HCT VFR BLD CALC: 44.1 %
HGB BLD-MCNC: 13.4 G/DL
IMM GRANULOCYTES NFR BLD AUTO: 0.4 %
LYMPHOCYTES # BLD AUTO: 2.98 K/UL
LYMPHOCYTES NFR BLD AUTO: 30.3 %
MAN DIFF?: NORMAL
MCHC RBC-ENTMCNC: 30 PG
MCHC RBC-ENTMCNC: 30.4 GM/DL
MCV RBC AUTO: 98.7 FL
MONOCYTES # BLD AUTO: 0.75 K/UL
MONOCYTES NFR BLD AUTO: 7.6 %
NEUTROPHILS # BLD AUTO: 5.97 K/UL
NEUTROPHILS NFR BLD AUTO: 60.6 %
PLATELET # BLD AUTO: 367 K/UL
POTASSIUM SERPL-SCNC: 4.4 MMOL/L
PROT SERPL-MCNC: 7.5 G/DL
RBC # BLD: 4.47 M/UL
RBC # FLD: 13.7 %
SODIUM SERPL-SCNC: 139 MMOL/L
WBC # FLD AUTO: 9.85 K/UL

## 2022-05-10 ENCOUNTER — RESULT REVIEW (OUTPATIENT)
Age: 67
End: 2022-05-10

## 2022-05-10 ENCOUNTER — APPOINTMENT (OUTPATIENT)
Dept: HEART AND VASCULAR | Facility: CLINIC | Age: 67
End: 2022-05-10

## 2022-06-07 ENCOUNTER — NON-APPOINTMENT (OUTPATIENT)
Age: 67
End: 2022-06-07

## 2022-06-10 ENCOUNTER — APPOINTMENT (OUTPATIENT)
Age: 67
End: 2022-06-10

## 2022-06-10 ENCOUNTER — APPOINTMENT (OUTPATIENT)
Dept: RHEUMATOLOGY | Facility: CLINIC | Age: 67
End: 2022-06-10

## 2022-06-10 DIAGNOSIS — R76.8 OTHER SPECIFIED ABNORMAL IMMUNOLOGICAL FINDINGS IN SERUM: ICD-10-CM

## 2022-06-10 PROCEDURE — 99213 OFFICE O/P EST LOW 20 MIN: CPT | Mod: 95

## 2022-06-14 ENCOUNTER — NON-APPOINTMENT (OUTPATIENT)
Age: 67
End: 2022-06-14

## 2022-06-14 ENCOUNTER — APPOINTMENT (OUTPATIENT)
Dept: HEART AND VASCULAR | Facility: CLINIC | Age: 67
End: 2022-06-14
Payer: MEDICARE

## 2022-06-14 VITALS
WEIGHT: 178 LBS | DIASTOLIC BLOOD PRESSURE: 80 MMHG | HEART RATE: 86 BPM | BODY MASS INDEX: 29.66 KG/M2 | OXYGEN SATURATION: 97 % | TEMPERATURE: 97.1 F | HEIGHT: 65 IN | RESPIRATION RATE: 16 BRPM | SYSTOLIC BLOOD PRESSURE: 110 MMHG

## 2022-06-14 PROCEDURE — 93000 ELECTROCARDIOGRAM COMPLETE: CPT

## 2022-06-14 PROCEDURE — 99204 OFFICE O/P NEW MOD 45 MIN: CPT

## 2022-06-14 RX ORDER — BACLOFEN 20 MG
100 TABLET ORAL
Refills: 0 | Status: DISCONTINUED | COMMUNITY
End: 2022-06-14

## 2022-06-14 NOTE — HISTORY OF PRESENT ILLNESS
[FreeTextEntry1] : 67-year-old female with past medical history of intermittent hypertension and hyperlipidemia, ex-smoker and pulmonary nodule s/p lobectomy with Dr. Cortez, and now followed with Dr. Paulson. History of SVT on Toprol.  Here for change of provider. Since last visit with Dr. Hutchinson, she has been feeling well. Her palpitations are controlled on MTP ER 25 mg PO daily. She denies chest pain, dyspnea, orthopnea, PND, edema.  \par \par EKG today NSR at 78 bpm without STT abnormalities. \par \par Prior cardiology notes:\par \par 2-D echocardiogram  - Jan 2021 - normal \par treadmill stress test - Jan 2021 - normal \par zio svt and pac and pvc < 1 %\par \par April 2021 - \par s/p surgery had stage 1 lung ca\par March 2021: zio worn 1 run of VT\par 18 runs of SVT in 9 days longest 14 beats. \par \par Jan 2021: outpatient cardiac monitor to assess PVC burden found to have 9 episodes of short SVT 10.2 seconds the lowest episode also found a rare PACs and PVCs (not on BB during monitor)\par \par April 2021 Cardiac CT  with normal coronaries.

## 2022-06-14 NOTE — DISCUSSION/SUMMARY
[SVT] : paroxysmal supraventricular tachycardia [___ Month(s)] : in [unfilled] month(s) [de-identified] : Controlled on MTP ER 25 mg PO daily - refilled today [FreeTextEntry4] : To get labs checked at [facility], including lipid profile. Will call us to tell us when she has had it done. Will review, and if any issues to call patient.

## 2022-06-23 ENCOUNTER — APPOINTMENT (OUTPATIENT)
Dept: OBGYN | Facility: CLINIC | Age: 67
End: 2022-06-23
Payer: MEDICARE

## 2022-06-23 VITALS
SYSTOLIC BLOOD PRESSURE: 122 MMHG | WEIGHT: 179 LBS | DIASTOLIC BLOOD PRESSURE: 76 MMHG | HEIGHT: 66 IN | BODY MASS INDEX: 28.77 KG/M2

## 2022-06-23 DIAGNOSIS — Z01.419 ENCOUNTER FOR GYNECOLOGICAL EXAMINATION (GENERAL) (ROUTINE) W/OUT ABNORMAL FINDINGS: ICD-10-CM

## 2022-06-23 PROCEDURE — G0101: CPT

## 2022-06-23 RX ORDER — MULTIVIT-MIN/FOLIC/VIT K/LYCOP 400-300MCG
25 MCG TABLET ORAL
Refills: 0 | Status: COMPLETED | COMMUNITY
End: 2022-06-23

## 2022-06-23 RX ORDER — B-COMPLEX WITH VITAMIN C
TABLET ORAL
Refills: 0 | Status: COMPLETED | COMMUNITY
End: 2022-06-23

## 2022-06-23 RX ORDER — MULTIVITAMIN
TABLET ORAL
Refills: 0 | Status: COMPLETED | COMMUNITY
End: 2022-06-23

## 2022-06-23 NOTE — HISTORY OF PRESENT ILLNESS
[FreeTextEntry1] : 67 y o P1 female presents for routine annual GYN care\par No GYN concerns\par She was diagnosed with lung carcinoma in 2/2021 and is s/p surgery(RUL wedge resection- stage 1 adenoCa)\par Also s/p back surgery for herniated discs\par Reports normal bowel and bladder function\par Postmenopausal w/ mild vaginal dryness\par Has retired(is very happy)\par Last pap 6/30/21 NILM HPV negative\par Breast imaging 5/10/22 Benign BI-RADS 1\par Colonoscopy 7/19/21 normal: repeat in 5 years\par

## 2022-07-05 LAB
CYTOLOGY CVX/VAG DOC THIN PREP: ABNORMAL
HPV HIGH+LOW RISK DNA PNL CVX: NOT DETECTED

## 2022-07-07 ENCOUNTER — RESULT REVIEW (OUTPATIENT)
Age: 67
End: 2022-07-07

## 2022-07-08 ENCOUNTER — APPOINTMENT (OUTPATIENT)
Dept: PULMONOLOGY | Facility: CLINIC | Age: 67
End: 2022-07-08

## 2022-07-08 VITALS
HEIGHT: 66 IN | SYSTOLIC BLOOD PRESSURE: 126 MMHG | WEIGHT: 180 LBS | OXYGEN SATURATION: 97 % | DIASTOLIC BLOOD PRESSURE: 78 MMHG | TEMPERATURE: 97.8 F | HEART RATE: 72 BPM | BODY MASS INDEX: 28.93 KG/M2

## 2022-07-08 PROCEDURE — 99214 OFFICE O/P EST MOD 30 MIN: CPT

## 2022-07-12 ENCOUNTER — LABORATORY RESULT (OUTPATIENT)
Age: 67
End: 2022-07-12

## 2022-07-12 ENCOUNTER — RESULT REVIEW (OUTPATIENT)
Age: 67
End: 2022-07-12

## 2022-07-12 ENCOUNTER — APPOINTMENT (OUTPATIENT)
Dept: GERIATRICS | Facility: CLINIC | Age: 67
End: 2022-07-12

## 2022-07-12 VITALS
BODY MASS INDEX: 28.73 KG/M2 | DIASTOLIC BLOOD PRESSURE: 74 MMHG | TEMPERATURE: 97.9 F | HEART RATE: 72 BPM | WEIGHT: 178 LBS | SYSTOLIC BLOOD PRESSURE: 118 MMHG | OXYGEN SATURATION: 100 %

## 2022-07-12 DIAGNOSIS — R92.2 INCONCLUSIVE MAMMOGRAM: ICD-10-CM

## 2022-07-12 DIAGNOSIS — K76.89 OTHER SPECIFIED DISEASES OF LIVER: ICD-10-CM

## 2022-07-12 DIAGNOSIS — R07.81 PLEURODYNIA: ICD-10-CM

## 2022-07-12 DIAGNOSIS — R07.9 CHEST PAIN, UNSPECIFIED: ICD-10-CM

## 2022-07-12 DIAGNOSIS — R10.811 RIGHT UPPER QUADRANT ABDOMINAL TENDERNESS: ICD-10-CM

## 2022-07-12 DIAGNOSIS — R91.1 SOLITARY PULMONARY NODULE: ICD-10-CM

## 2022-07-12 PROCEDURE — 99213 OFFICE O/P EST LOW 20 MIN: CPT | Mod: 25

## 2022-07-12 PROCEDURE — G0402 INITIAL PREVENTIVE EXAM: CPT

## 2022-07-12 RX ORDER — UMECLIDINIUM BROMIDE AND VILANTEROL TRIFENATATE 62.5; 25 UG/1; UG/1
62.5-25 POWDER RESPIRATORY (INHALATION)
Qty: 3 | Refills: 3 | Status: DISCONTINUED | COMMUNITY
Start: 2021-05-27 | End: 2022-07-12

## 2022-07-14 NOTE — PHYSICAL EXAM
[No Acute Distress] : no acute distress [III] : Mallampati Class: III [Normal Appearance] : normal appearance [No Neck Mass] : no neck mass [Normal Rate/Rhythm] : normal rate/rhythm [Normal S1, S2] : normal s1, s2 [No Murmurs] : no murmurs [No Resp Distress] : no resp distress [No Acc Muscle Use] : no acc muscle use [Normal Rhythm and Effort] : normal rhythm and effort [Surgical scars] : surgical scars [Benign] : benign [Normal Gait] : normal gait [No Clubbing] : no clubbing [No Cyanosis] : no cyanosis [No Edema] : no edema [FROM] : FROM [Normal Color/ Pigmentation] : normal color/ pigmentation [No Focal Deficits] : no focal deficits [Oriented x3] : oriented x3 [Normal Affect] : normal affect [TextBox_11] : mild nasal congestion, mild erythema [TextBox_68] : slightly bronchial BS, no wheeze rhonchi or rales

## 2022-07-14 NOTE — HISTORY OF PRESENT ILLNESS
[Never] : never [Former] : former [TextBox_4] : Patient returns on a f/u for abnormal chest CT/ COPD. Patient feels good and is breathing well. She has no issues walking or exercising. She has not been coughing much and never coughs up any phlegm if she does. She denies sinus issues, CP, pressure, tightness, or heartburn. She is still on 3 mg prednisone and has gained weight as a result. She has gained 2 more lbs since 6/14 (178 -> 180) and a total of 23 lbs since her last visit on 5/27/2021.  [TextBox_11] : 1 [TextBox_13] : 12

## 2022-07-17 PROBLEM — R76.8 POSITIVE ANA (ANTINUCLEAR ANTIBODY): Status: ACTIVE | Noted: 2019-11-01

## 2022-07-17 PROBLEM — R92.2 DENSE BREAST: Status: ACTIVE | Noted: 2019-02-22

## 2022-07-17 PROBLEM — R91.1 LUNG NODULE: Status: ACTIVE | Noted: 2019-11-08

## 2022-07-17 PROBLEM — R07.81 PLEURODYNIA: Status: ACTIVE | Noted: 2021-02-19

## 2022-07-17 PROBLEM — K76.89 LIVER CYST: Status: ACTIVE | Noted: 2019-03-09

## 2022-07-17 PROBLEM — R10.811 RUQ ABDOMINAL TENDERNESS: Status: ACTIVE | Noted: 2019-03-09

## 2022-07-17 PROBLEM — R07.9 CHEST PAIN: Status: ACTIVE | Noted: 2021-01-15

## 2022-07-17 NOTE — HISTORY OF PRESENT ILLNESS
[Compliant with medications] : compliant with medications [Fully Independent] : fully independent [Drives without concerns] : drives without concerns [No history of falls] : no history of falls [Seatbelts] : seatbelts [Safe Driving Habits] : safe driving habits [Smoke Detectors] : smoke detectors [No falls in past year] : Patient reported no falls in the past year [Completely Independent] : Completely independent. [Smoke Detector] : smoke detector [Carbon Monoxide Detector] : carbon monoxide detector [0] : 2) Feeling down, depressed, or hopeless: Not at all (0) [PMH Reviewed and Updated] : past medical history reviewed and updated [PSH Reviewed and Updated] : past surgical history reviewed and updated [Medication and Allergies Reconciled] : medication and allergies reconciled [Patient reported osteoporosis screening was abnormal] : Patient reported osteoporosis screening was abnormal [Patient reported vision is normal] : Patient reported vision is normal [Patient reported colon/rectal cancer screening was abnormal] : Patient reported colon/rectal cancer screening was abnormal [Patient reported breast cancer screening was normal] : Patient reported breast cancer screening was normal [Over the Past 2 Weeks, Have You Felt Down, Depressed, or Hopeless?] : 1.) Over the past 2 weeks, have you felt down, depressed, or hopeless? No [Over the Past 2 Weeks, Have You Felt Little Interest or Pleasure Doing Things?] : 2.) Over the past 2 weeks, have you felt little interest or pleasure doing things? No [Bathroom Grab Bars] : not using bathroom grab bars [FreeTextEntry1] : Patient presents today for annual visit.\par \par continues to deal with several issues:\par 1-fibromyalgia-seen by Dr Infante, dealing with a flare, now on tapering doses of prednisone, feeling better, less pain\par \par 2. pulmonary nodules/COPD-CT done in July for follow up, also seen by Dr Paulson.no sig changes seen on recent CT, pt feeling well, coughing at a min, no breathing issues per patient, states has not been using inhaler \par \par 3. SVT-seen by Dr Banuelos, cont on Bblocker, asymp, ECHO/treadmill stress test, Jan 2021-both normal\par \par \par pt also complaining of RUQ pain tenderness, states has been fairly chronic, no radiation, doesn’t think assoc with food, does not correlate to eating, position\par no assoc symptoms\par \par \par cardiology-Dr. Banuelos\par Rheum-Dr. Infante\par GYN-Dr. Haq\par Pulmonary-Dr. Paulson\par Neurosurg-Dr. Muñoz [BreastSonogramDate] : 08/21 [TextBox_25] : osteopenia, repeat due 2023 [PapSmeardate] : 07/21 [TextBox_31] : hearing aides jose [BoneDensityDate] : 05/21 [Mammogramdate] : 07/21 [FreeTextEntry3] : 05/22 [TextBox_19] : EGD-mild esophagitis, colonic diverticula, int hemorrhoids, repeat 5 yrs [FreeTextEntry4] : with US, both normal [FreeTextEntry5] : 06/22 [FreeTextEntry6] : neg pap [Grab Bars] : no grab bars [Shower Chair] : no shower chair [Driving Concerns] : not driving or driving without noted concerns [[id number]] : 0

## 2022-07-17 NOTE — PHYSICAL EXAM
[General Appearance - Alert] : alert [General Appearance - In No Acute Distress] : in no acute distress [General Appearance - Well Nourished] : well nourished [General Appearance - Well Developed] : well developed [General Appearance - Well-Appearing] : healthy appearing [Sclera] : the sclera and conjunctiva were normal [PERRL With Normal Accommodation] : pupils were equal in size, round, and reactive to light [Extraocular Movements] : extraocular movements were intact [Optic Disc Abnormality] : the optic disc were normal in size and color [Outer Ear] : the ears and nose were normal in appearance [Hearing Threshold Finger Rub Not [place]] : hearing was normal [Examination Of The Oral Cavity] : the lips and gums were normal [Both Tympanic Membranes Were Examined] : both tympanic membranes were normal [Nasal Cavity] : the nasal mucosa and septum were normal [Oropharynx] : the oropharynx was normal [Neck Appearance] : the appearance of the neck was normal [Neck Cervical Mass (___cm)] : no neck mass was observed [Jugular Venous Distention Increased] : there was no jugular-venous distention [Thyroid Diffuse Enlargement] : the thyroid was not enlarged [Thyroid Nodule] : there were no palpable thyroid nodules [Respiration, Rhythm And Depth] : normal respiratory rhythm and effort [Auscultation Breath Sounds / Voice Sounds] : lungs were clear to auscultation bilaterally [Exaggerated Use Of Accessory Muscles For Inspiration] : no accessory muscle use [Chest Palpation] : palpation of the chest revealed no abnormalities [Lungs Percussion] : the lungs were normal to percussion [Apical Impulse] : the apical impulse was normal [Heart Rate And Rhythm] : heart rate was normal and rhythm regular [Heart Sounds Gallop] : no gallops [Heart Sounds] : normal S1 and S2 [Murmurs] : no murmurs [Heart Sounds Pericardial Friction Rub] : no pericardial rub [Arterial Pulses Carotid] : carotid pulses were normal with no bruits [Abdominal Aorta] : the abdominal aorta was normal [Arterial Pulses Femoral] : femoral pulses were normal without bruits [Edema] : there was no peripheral edema [Full Pulse] : the pedal pulses are present [Bowel Sounds] : normal bowel sounds [Abdomen Soft] : soft [Abdomen Hernia] : no hernia was discovered [Cervical Lymph Nodes Enlarged Posterior Bilaterally] : posterior cervical [Axillary Lymph Nodes Enlarged Bilaterally] : axillary [Supraclavicular Lymph Nodes Enlarged Bilaterally] : supraclavicular [No CVA Tenderness] : no ~M costovertebral angle tenderness [No Spinal Tenderness] : no spinal tenderness [Abnormal Walk] : normal gait [Nail Clubbing] : no clubbing  or cyanosis of the fingernails [Motor Tone] : muscle strength and tone were normal [Skin Color & Pigmentation] : normal skin color and pigmentation [Skin Turgor] : normal skin turgor [] : no rash [Skin Lesions] : no skin lesions [Cranial Nerves] : cranial nerves 2-12 were intact [Sensation] : the sensory exam was normal to light touch and pinprick [Deep Tendon Reflexes (DTR)] : deep tendon reflexes were 2+ and symmetric [Motor Exam] : the motor exam was normal [No Focal Deficits] : no focal deficits [Oriented To Time, Place, And Person] : oriented to person, place, and time [Impaired Insight] : insight and judgment were intact [Mood] : the mood was normal [Affect] : the affect was normal [Memory Recent] : recent memory was not impaired [Memory Remote] : remote memory was not impaired [FreeTextEntry1] : mild tenderness with RUQ palpation, also tender over lower right ant rib cge

## 2022-07-20 ENCOUNTER — NON-APPOINTMENT (OUTPATIENT)
Age: 67
End: 2022-07-20

## 2022-07-22 LAB
APPEARANCE: ABNORMAL
BILIRUBIN URINE: NEGATIVE
BLOOD URINE: NEGATIVE
COLOR: YELLOW
GLUCOSE QUALITATIVE U: NEGATIVE
KETONES URINE: NORMAL
LEUKOCYTE ESTERASE URINE: NEGATIVE
NITRITE URINE: NEGATIVE
PH URINE: 5.5
PROTEIN URINE: NORMAL
SPECIFIC GRAVITY URINE: 1.03
UROBILINOGEN URINE: NORMAL

## 2022-07-29 NOTE — HISTORY OF PRESENT ILLNESS
[Home] : at home, [unfilled] , at the time of the visit. [Medical Office: ([facility])___] : at the medical office located in  [Verbal consent obtained from patient] : the patient, [unfilled] [FreeTextEntry1] : She is taking Prednisone 4 mg daily for 2 weeks.  She has no worsening in her symptoms.\par \par She will have a f/u CT chest in July and then will see pulm. No

## 2022-08-01 ENCOUNTER — RESULT REVIEW (OUTPATIENT)
Age: 67
End: 2022-08-01

## 2022-10-06 ENCOUNTER — LABORATORY RESULT (OUTPATIENT)
Age: 67
End: 2022-10-06

## 2022-10-06 ENCOUNTER — APPOINTMENT (OUTPATIENT)
Dept: RHEUMATOLOGY | Facility: CLINIC | Age: 67
End: 2022-10-06

## 2022-10-06 VITALS
HEIGHT: 66 IN | DIASTOLIC BLOOD PRESSURE: 78 MMHG | OXYGEN SATURATION: 98 % | WEIGHT: 178 LBS | HEART RATE: 87 BPM | SYSTOLIC BLOOD PRESSURE: 118 MMHG | BODY MASS INDEX: 28.61 KG/M2

## 2022-10-06 DIAGNOSIS — Z23 ENCOUNTER FOR IMMUNIZATION: ICD-10-CM

## 2022-10-06 PROCEDURE — 99214 OFFICE O/P EST MOD 30 MIN: CPT | Mod: 25

## 2022-10-06 PROCEDURE — G0008: CPT

## 2022-10-06 PROCEDURE — 90662 IIV NO PRSV INCREASED AG IM: CPT

## 2022-10-13 NOTE — HISTORY OF PRESENT ILLNESS
[FreeTextEntry1] : She is off Prednisone for 2 weeks.  Every single muscle and bone in her body hurts.  \par \par She had a physical in July and she felt good.  Her ESR was normal.\par \par Pain is worse in the shoulders.\par \par No fevers.\par \par She denies headache, blurry vision, scalp and jaw tenderness.\par

## 2022-10-13 NOTE — ASSESSMENT
[FreeTextEntry1] : Flaring due to rapid discontinuation of Prednisone.  Will repeat inflammatory  markers.  If elevated, to restart Prednisone, with a very slow taper.\par \par Will repeat vitamin B12 levels.  Will need parenteral replacement.

## 2022-10-14 LAB
ALBUMIN SERPL ELPH-MCNC: 4.8 G/DL
ALP BLD-CCNC: 101 U/L
ALT SERPL-CCNC: 21 U/L
ANION GAP SERPL CALC-SCNC: 27 MMOL/L
AST SERPL-CCNC: 23 U/L
BASOPHILS # BLD AUTO: 0.05 K/UL
BASOPHILS NFR BLD AUTO: 0.6 %
BILIRUB SERPL-MCNC: 0.2 MG/DL
BUN SERPL-MCNC: 9 MG/DL
CALCIUM SERPL-MCNC: 10.1 MG/DL
CHLORIDE SERPL-SCNC: 107 MMOL/L
CK SERPL-CCNC: 64 U/L
CO2 SERPL-SCNC: 13 MMOL/L
CREAT SERPL-MCNC: 0.76 MG/DL
CRP SERPL-MCNC: 9 MG/L
EGFR: 87 ML/MIN/1.73M2
EOSINOPHIL # BLD AUTO: 0.06 K/UL
EOSINOPHIL NFR BLD AUTO: 0.7 %
ERYTHROCYTE [SEDIMENTATION RATE] IN BLOOD BY WESTERGREN METHOD: 45 MM/HR
GLUCOSE SERPL-MCNC: 99 MG/DL
HCT VFR BLD CALC: 42.4 %
HGB BLD-MCNC: 13.4 G/DL
IMM GRANULOCYTES NFR BLD AUTO: 0.1 %
LYMPHOCYTES # BLD AUTO: 2.7 K/UL
LYMPHOCYTES NFR BLD AUTO: 31.9 %
MAN DIFF?: NORMAL
MCHC RBC-ENTMCNC: 30.7 PG
MCHC RBC-ENTMCNC: 31.6 GM/DL
MCV RBC AUTO: 97.2 FL
MONOCYTES # BLD AUTO: 0.65 K/UL
MONOCYTES NFR BLD AUTO: 7.7 %
NEUTROPHILS # BLD AUTO: 4.99 K/UL
NEUTROPHILS NFR BLD AUTO: 59 %
PLATELET # BLD AUTO: 334 K/UL
POTASSIUM SERPL-SCNC: 4.4 MMOL/L
PROT SERPL-MCNC: 7.8 G/DL
RBC # BLD: 4.36 M/UL
RBC # FLD: 12.7 %
SODIUM SERPL-SCNC: 147 MMOL/L
WBC # FLD AUTO: 8.46 K/UL

## 2022-10-20 ENCOUNTER — APPOINTMENT (OUTPATIENT)
Dept: RHEUMATOLOGY | Facility: CLINIC | Age: 67
End: 2022-10-20

## 2022-10-20 DIAGNOSIS — G62.9 POLYNEUROPATHY, UNSPECIFIED: ICD-10-CM

## 2022-10-20 PROCEDURE — 96372 THER/PROPH/DIAG INJ SC/IM: CPT

## 2022-10-20 RX ORDER — CYANOCOBALAMIN 1000 UG/ML
1000 INJECTION INTRAMUSCULAR; SUBCUTANEOUS
Qty: 0 | Refills: 0 | Status: COMPLETED | OUTPATIENT
Start: 2022-10-20

## 2022-10-20 RX ADMIN — CYANOCOBALAMIN 0 MCG/ML: 1000 INJECTION, SOLUTION INTRAMUSCULAR at 00:00

## 2022-10-26 ENCOUNTER — APPOINTMENT (OUTPATIENT)
Dept: RHEUMATOLOGY | Facility: CLINIC | Age: 67
End: 2022-10-26

## 2022-10-26 PROCEDURE — 96372 THER/PROPH/DIAG INJ SC/IM: CPT

## 2022-10-26 RX ORDER — CYANOCOBALAMIN 1000 UG/ML
1000 INJECTION INTRAMUSCULAR; SUBCUTANEOUS
Qty: 0 | Refills: 0 | Status: COMPLETED | OUTPATIENT
Start: 2022-10-26

## 2022-11-02 ENCOUNTER — APPOINTMENT (OUTPATIENT)
Dept: RHEUMATOLOGY | Facility: CLINIC | Age: 67
End: 2022-11-02

## 2022-11-02 PROCEDURE — 96372 THER/PROPH/DIAG INJ SC/IM: CPT

## 2022-11-02 RX ADMIN — CYANOCOBALAMIN 0 MCG/ML: 1000 INJECTION, SOLUTION INTRAMUSCULAR at 00:00

## 2022-11-03 RX ORDER — CYANOCOBALAMIN 1000 UG/ML
1000 INJECTION INTRAMUSCULAR; SUBCUTANEOUS
Qty: 0 | Refills: 0 | Status: COMPLETED | OUTPATIENT
Start: 2022-11-02

## 2022-11-09 ENCOUNTER — APPOINTMENT (OUTPATIENT)
Dept: RHEUMATOLOGY | Facility: CLINIC | Age: 67
End: 2022-11-09

## 2022-11-09 PROCEDURE — 96372 THER/PROPH/DIAG INJ SC/IM: CPT

## 2022-11-11 RX ORDER — CYANOCOBALAMIN 1000 UG/ML
1000 INJECTION INTRAMUSCULAR; SUBCUTANEOUS
Qty: 0 | Refills: 0 | Status: COMPLETED | OUTPATIENT
Start: 2022-11-11

## 2022-11-11 RX ADMIN — CYANOCOBALAMIN 0 MCG/ML: 1000 INJECTION, SOLUTION INTRAMUSCULAR at 00:00

## 2022-11-30 ENCOUNTER — RESULT REVIEW (OUTPATIENT)
Age: 67
End: 2022-11-30

## 2022-11-30 ENCOUNTER — APPOINTMENT (OUTPATIENT)
Dept: RHEUMATOLOGY | Facility: CLINIC | Age: 67
End: 2022-11-30

## 2022-12-05 ENCOUNTER — APPOINTMENT (OUTPATIENT)
Dept: PULMONOLOGY | Facility: CLINIC | Age: 67
End: 2022-12-05

## 2022-12-05 ENCOUNTER — NON-APPOINTMENT (OUTPATIENT)
Age: 67
End: 2022-12-05

## 2022-12-05 ENCOUNTER — APPOINTMENT (OUTPATIENT)
Dept: RHEUMATOLOGY | Facility: CLINIC | Age: 67
End: 2022-12-05

## 2022-12-05 VITALS
HEIGHT: 66 IN | BODY MASS INDEX: 28.61 KG/M2 | TEMPERATURE: 98.6 F | HEART RATE: 92 BPM | WEIGHT: 178 LBS | SYSTOLIC BLOOD PRESSURE: 130 MMHG | DIASTOLIC BLOOD PRESSURE: 82 MMHG | OXYGEN SATURATION: 96 %

## 2022-12-05 DIAGNOSIS — J30.89 OTHER ALLERGIC RHINITIS: ICD-10-CM

## 2022-12-05 PROCEDURE — 94010 BREATHING CAPACITY TEST: CPT

## 2022-12-05 PROCEDURE — 99214 OFFICE O/P EST MOD 30 MIN: CPT | Mod: 25

## 2022-12-05 PROCEDURE — 96372 THER/PROPH/DIAG INJ SC/IM: CPT

## 2022-12-05 NOTE — REVIEW OF SYSTEMS
-- DO NOT REPLY / DO NOT REPLY ALL --  -- Message is from the [facility]--    General Patient Message      Reason for Call: Patient had put in a request for a medication refill 01/02/2020 for \"losartan (COZAAR) 25 MG tablet\"   \"carvedilol (COREG) 6.25 MG tablet'  \"amLODIPine (NORVASC) 5 MG tablet\"  \"clopidogrel (PLAVIX) 75 MG tablet\"    Caller Information       Type Contact Phone    01/08/2020 12:22 PM Phone (Incoming)      01/08/2020 12:22 PM Phone (Incoming) Chaya Sherman (Self) [phone] (M)          Alternative phone number: no    Turnaround time given to caller:   \"This message will be sent to [state Provider's name]. The clinical team will fulfill your request as soon as they review your message.\"}     [Recent Wt Loss (___ Lbs)] : ~T recent [unfilled] lb weight loss [Cough] : cough [Negative] : Endocrine

## 2022-12-06 ENCOUNTER — APPOINTMENT (OUTPATIENT)
Dept: RHEUMATOLOGY | Facility: CLINIC | Age: 67
End: 2022-12-06

## 2022-12-06 PROCEDURE — 99213 OFFICE O/P EST LOW 20 MIN: CPT | Mod: 95

## 2022-12-07 PROBLEM — J30.89 ENVIRONMENTAL AND SEASONAL ALLERGIES: Status: ACTIVE | Noted: 2021-05-27

## 2022-12-07 RX ADMIN — CYANOCOBALAMIN 1 MCG/ML: 1000 INJECTION, SOLUTION INTRAMUSCULAR at 00:00

## 2022-12-07 NOTE — HISTORY OF PRESENT ILLNESS
[Former] : former [Never] : never [TextBox_4] : Patient returns on a f/u of her abnormal CT and COPD. She reports a cough for the last 2 months and is unsure if it had any correlation w/ the change of season. She was started on 3 mg prednisone and B12 injections 1x/week by Dr. Infante and has been feeling better for the last month. She gets SOB occasionally on ANORO. Her nasal/ sinus Sx have diminished since the summer. She denies heartburn on famotidine. She denies daytime xerostomia.\par  [TextBox_11] : 1 [TextBox_13] : 12

## 2022-12-07 NOTE — PHYSICAL EXAM
[No Acute Distress] : no acute distress [III] : Mallampati Class: III [Normal Appearance] : normal appearance [No Neck Mass] : no neck mass [Normal Rate/Rhythm] : normal rate/rhythm [Normal S1, S2] : normal s1, s2 [No Murmurs] : no murmurs [No Resp Distress] : no resp distress [No Acc Muscle Use] : no acc muscle use [Normal Rhythm and Effort] : normal rhythm and effort [Surgical scars] : surgical scars [Benign] : benign [Normal Gait] : normal gait [No Clubbing] : no clubbing [No Cyanosis] : no cyanosis [No Edema] : no edema [FROM] : FROM [No Focal Deficits] : no focal deficits [Oriented x3] : oriented x3 [Normal Affect] : normal affect [Turbinate hypertrophy] : turbinate hypertrophy [TextBox_11] : inflamed nasal mucosa, dry MM, erythematous o/p [TextBox_68] : prolonged expiratory phase, globally diminished BS w/o overt wheeze [TextBox_125] : very dry, hyperpigmented on LE

## 2022-12-07 NOTE — DISCUSSION/SUMMARY
[FreeTextEntry1] : All images and report reviewed by me in the office \par [place] 12/5/2022: FEV1 2.11 (84% pred), FEV1/FVC 58\par CT 11/30/2022: Surgical site in the RUL is unchanged. Slight tree- in- bud nodules in the superior segment of the LLL. Reports says RLL, but the text of the report is in the LLL. It is indeed in the LLL. When compared w/ the prior CT 7/7/2022, there has been only slight change in the superior segment JESSICA nodules.\par \par CT 7/7/2022 no change. \par \par Chest CT 5/21/2021+ RUL scarring + LLL micro nodules and mucus plugging\par \par CXR 3/2/2021 NO residual PTX R volume loss o/w NAD\par CXR  02/19/2021 - small apical pneumothorax with volume loss post thoracotomy, decreasing in size compared to 2/12/21.\par CT 11/21/19 mild subpleural fibrosis and stable RUL nodule\par PATH- RUL wedge 2- - Adenocarcinoma in situ nonmucinous.2 cm, p.Tis.  pN0\par \par PFTs 05/21/2021 Actual FEV1 1.93 (Pred 75%) FEV1/FVC- 61, %  % RV/% DLCO 58% \par \par PFT 4/20/2017 FEV1 2.77 (103% predicted) FEV1/FVC 74 no change postBD % % DLCO 81% consistent with mild hyperinflation and a borderline DLCO

## 2022-12-07 NOTE — ASSESSMENT
[FreeTextEntry1] : above was discussed at length with the patient who has an excellent understanding of the issues. I informed the patient that this may be our last visit as I am leaving the practice at the end of December.

## 2022-12-09 RX ORDER — CYANOCOBALAMIN 1000 UG/ML
1000 INJECTION INTRAMUSCULAR; SUBCUTANEOUS
Qty: 0 | Refills: 0 | Status: COMPLETED | OUTPATIENT
Start: 2022-12-07

## 2022-12-09 NOTE — HISTORY OF PRESENT ILLNESS
[Home] : at home, [unfilled] , at the time of the visit. [Medical Office: ([facility])___] : at the medical office located in  [Verbal consent obtained from patient] : the patient, [unfilled] [FreeTextEntry1] : She is taking Prednisone 3 mg daily since 10/13/22.  Her symptoms improved a lot.  She has shoulder pain but she needs B/L shoulder replacement.\par \par She is receiving B12 injections and notes her fatigue is improved.\par \par

## 2022-12-13 ENCOUNTER — APPOINTMENT (OUTPATIENT)
Dept: HEART AND VASCULAR | Facility: CLINIC | Age: 67
End: 2022-12-13

## 2022-12-13 VITALS
DIASTOLIC BLOOD PRESSURE: 78 MMHG | OXYGEN SATURATION: 98 % | HEIGHT: 66 IN | BODY MASS INDEX: 27.97 KG/M2 | SYSTOLIC BLOOD PRESSURE: 112 MMHG | WEIGHT: 174 LBS | HEART RATE: 76 BPM | RESPIRATION RATE: 16 BRPM | TEMPERATURE: 97.8 F

## 2022-12-13 PROCEDURE — 99214 OFFICE O/P EST MOD 30 MIN: CPT

## 2022-12-13 NOTE — DISCUSSION/SUMMARY
[SVT] : paroxysmal supraventricular tachycardia [___ Month(s)] : in [unfilled] month(s) [Diet Modification] : diet modification [Exercise] : exercise [Weight Loss] : weight loss [de-identified] :  ; stable [de-identified] : Controlled on MTP ER 25 mg PO daily - refilled today

## 2022-12-13 NOTE — HISTORY OF PRESENT ILLNESS
[FreeTextEntry1] : 67-year-old female with past medical history of intermittent hypertension and hyperlipidemia, ex-smoker and pulmonary nodule s/p lobectomy with Dr. Cortez, and now followed with Dr. Paulson. History of SVT on Toprol.  Since last visit, she has been feeling well. Her palpitations are controlled on MTP ER 25 mg PO daily. She denies chest pain, dyspnea, orthopnea, PND, edema.  \par \par Labs July 2022: TG 85; Total chol 255; ; . CBC/CMP WNL. \par \par EKG 06/2022: NSR at 78 bpm without STT abnormalities. \par \par Prior cardiology notes:\par \par 2-D echocardiogram  - Jan 2021 - normal \par treadmill stress test - Jan 2021 - normal \par zio svt and pac and pvc < 1 %\par \par April 2021 - \par s/p surgery had stage 1 lung ca\par March 2021: zio worn 1 run of VT\par 18 runs of SVT in 9 days longest 14 beats. \par \par Jan 2021: outpatient cardiac monitor to assess PVC burden found to have 9 episodes of short SVT 10.2 seconds the lowest episode also found a rare PACs and PVCs (not on BB during monitor)\par \par April 2021 Cardiac CT  with normal coronaries.

## 2022-12-15 DIAGNOSIS — R93.89 ABNORMAL FINDINGS ON DIAGNOSTIC IMAGING OF OTHER SPECIFIED BODY STRUCTURES: ICD-10-CM

## 2022-12-16 ENCOUNTER — APPOINTMENT (OUTPATIENT)
Dept: PULMONOLOGY | Facility: CLINIC | Age: 67
End: 2022-12-16

## 2022-12-28 ENCOUNTER — APPOINTMENT (OUTPATIENT)
Dept: RHEUMATOLOGY | Facility: CLINIC | Age: 67
End: 2022-12-28

## 2023-01-04 ENCOUNTER — APPOINTMENT (OUTPATIENT)
Dept: RHEUMATOLOGY | Facility: CLINIC | Age: 68
End: 2023-01-04
Payer: MEDICARE

## 2023-01-04 ENCOUNTER — LABORATORY RESULT (OUTPATIENT)
Age: 68
End: 2023-01-04

## 2023-01-04 VITALS
DIASTOLIC BLOOD PRESSURE: 38 MMHG | SYSTOLIC BLOOD PRESSURE: 140 MMHG | HEIGHT: 66 IN | HEART RATE: 81 BPM | OXYGEN SATURATION: 98 %

## 2023-01-04 DIAGNOSIS — Z87.898 PERSONAL HISTORY OF OTHER SPECIFIED CONDITIONS: ICD-10-CM

## 2023-01-04 PROCEDURE — 99213 OFFICE O/P EST LOW 20 MIN: CPT | Mod: 25

## 2023-01-04 PROCEDURE — 36415 COLL VENOUS BLD VENIPUNCTURE: CPT

## 2023-01-11 ENCOUNTER — APPOINTMENT (OUTPATIENT)
Dept: RHEUMATOLOGY | Facility: CLINIC | Age: 68
End: 2023-01-11

## 2023-01-11 ENCOUNTER — APPOINTMENT (OUTPATIENT)
Dept: PULMONOLOGY | Facility: CLINIC | Age: 68
End: 2023-01-11

## 2023-01-17 NOTE — HISTORY OF PRESENT ILLNESS
[FreeTextEntry1] : She is taking Prednisone 3 mg daily since 10/13/22. \par She has shoulder pain but she needs B/L shoulder replacement.\par \par She is receiving B12 injections monthly.\par \par She denies headache, blurry vision, scalp and jaw tenderness.\par \par She gets leg cramps rarely at night.\par \par She denies headache, blurry vision, scalp and jaw tenderness.

## 2023-02-10 ENCOUNTER — APPOINTMENT (OUTPATIENT)
Dept: RHEUMATOLOGY | Facility: CLINIC | Age: 68
End: 2023-02-10
Payer: MEDICARE

## 2023-02-10 VITALS
WEIGHT: 174 LBS | HEIGHT: 66 IN | OXYGEN SATURATION: 99 % | SYSTOLIC BLOOD PRESSURE: 116 MMHG | HEART RATE: 69 BPM | BODY MASS INDEX: 27.97 KG/M2 | DIASTOLIC BLOOD PRESSURE: 76 MMHG

## 2023-02-10 PROCEDURE — 99215 OFFICE O/P EST HI 40 MIN: CPT

## 2023-03-17 NOTE — HISTORY OF PRESENT ILLNESS
[FreeTextEntry1] : She started B12 SL.\par Her sister has pernicious anemia (Intrinsic factor Ab).\par \par 10 years ago she had a schwannoma resected from her stomach.\par \par She is on Prednisone 3 mg since October.  She feels just as good as she did on the higher dose of Prednisone.\par \par + fatigue but she has insomnia.  She has trouble falling asleep.\par \par She denies headache, blurry vision, scalp and jaw tenderness.\par \par She notes that when she goes in the heated pool, her shoulders feel better.

## 2023-03-29 ENCOUNTER — APPOINTMENT (OUTPATIENT)
Dept: RHEUMATOLOGY | Facility: CLINIC | Age: 68
End: 2023-03-29
Payer: MEDICARE

## 2023-03-29 VITALS
BODY MASS INDEX: 27.97 KG/M2 | SYSTOLIC BLOOD PRESSURE: 124 MMHG | HEART RATE: 82 BPM | HEIGHT: 66 IN | WEIGHT: 174 LBS | OXYGEN SATURATION: 99 % | DIASTOLIC BLOOD PRESSURE: 72 MMHG

## 2023-03-29 PROCEDURE — 36415 COLL VENOUS BLD VENIPUNCTURE: CPT

## 2023-03-29 PROCEDURE — 99214 OFFICE O/P EST MOD 30 MIN: CPT | Mod: 25

## 2023-04-03 DIAGNOSIS — Z15.01 GENETIC SUSCEPTIBILITY TO MALIGNANT NEOPLASM OF BREAST: ICD-10-CM

## 2023-04-13 LAB
ALBUMIN SERPL ELPH-MCNC: 4.5 G/DL
ALP BLD-CCNC: 102 U/L
ALT SERPL-CCNC: 13 U/L
ANION GAP SERPL CALC-SCNC: 17 MMOL/L
AST SERPL-CCNC: 14 U/L
BASOPHILS # BLD AUTO: 0.05 K/UL
BASOPHILS NFR BLD AUTO: 0.7 %
BILIRUB SERPL-MCNC: 0.2 MG/DL
BUN SERPL-MCNC: 16 MG/DL
CALCIUM SERPL-MCNC: 9.7 MG/DL
CHLORIDE SERPL-SCNC: 101 MMOL/L
CO2 SERPL-SCNC: 22 MMOL/L
CREAT SERPL-MCNC: 0.83 MG/DL
CRP SERPL-MCNC: 8 MG/L
EGFR: 78 ML/MIN/1.73M2
EOSINOPHIL # BLD AUTO: 0.09 K/UL
EOSINOPHIL NFR BLD AUTO: 1.2 %
ERYTHROCYTE [SEDIMENTATION RATE] IN BLOOD BY WESTERGREN METHOD: 53 MM/HR
GLUCOSE SERPL-MCNC: 93 MG/DL
HCT VFR BLD CALC: 40.2 %
HGB BLD-MCNC: 12.7 G/DL
IF BLOCK AB SER QL: 1.1 AU/ML
IMM GRANULOCYTES NFR BLD AUTO: 0.3 %
LYMPHOCYTES # BLD AUTO: 2.3 K/UL
LYMPHOCYTES NFR BLD AUTO: 30.8 %
MAN DIFF?: NORMAL
MCHC RBC-ENTMCNC: 30.5 PG
MCHC RBC-ENTMCNC: 31.6 GM/DL
MCV RBC AUTO: 96.4 FL
MONOCYTES # BLD AUTO: 0.57 K/UL
MONOCYTES NFR BLD AUTO: 7.6 %
NEUTROPHILS # BLD AUTO: 4.44 K/UL
NEUTROPHILS NFR BLD AUTO: 59.4 %
PCA AB SER QL IF: NORMAL
PLATELET # BLD AUTO: 320 K/UL
POTASSIUM SERPL-SCNC: 4.3 MMOL/L
PROT SERPL-MCNC: 7.2 G/DL
RBC # BLD: 4.17 M/UL
RBC # FLD: 13.3 %
SODIUM SERPL-SCNC: 139 MMOL/L
VIT B12 SERPL-MCNC: 1020 PG/ML
WBC # FLD AUTO: 7.47 K/UL

## 2023-04-24 NOTE — HISTORY OF PRESENT ILLNESS
[FreeTextEntry1] : She is on Prednisone 2 mg since October.  No change in symptoms with lowering the dose of Prednisone.\par \par She has poor sleep-wakes up many times at night and feels tired during the day.\par \par She denies frequent headaches, blurry vision, scalp and jaw tenderness.\par \par She takes B12 SL.\par \par She has right shoulder pain and has deferred surgery twice.

## 2023-05-11 ENCOUNTER — RESULT REVIEW (OUTPATIENT)
Age: 68
End: 2023-05-11

## 2023-05-15 ENCOUNTER — APPOINTMENT (OUTPATIENT)
Dept: RHEUMATOLOGY | Facility: CLINIC | Age: 68
End: 2023-05-15
Payer: MEDICARE

## 2023-05-15 VITALS
HEART RATE: 71 BPM | OXYGEN SATURATION: 99 % | SYSTOLIC BLOOD PRESSURE: 124 MMHG | DIASTOLIC BLOOD PRESSURE: 82 MMHG | BODY MASS INDEX: 27.97 KG/M2 | WEIGHT: 174 LBS | HEIGHT: 66 IN

## 2023-05-15 PROCEDURE — 99214 OFFICE O/P EST MOD 30 MIN: CPT | Mod: 25

## 2023-05-15 PROCEDURE — 36415 COLL VENOUS BLD VENIPUNCTURE: CPT

## 2023-05-16 LAB
ALBUMIN SERPL ELPH-MCNC: 4.5 G/DL
ALP BLD-CCNC: 98 U/L
ALT SERPL-CCNC: 16 U/L
ANION GAP SERPL CALC-SCNC: 14 MMOL/L
AST SERPL-CCNC: 14 U/L
BASOPHILS # BLD AUTO: 0.05 K/UL
BASOPHILS NFR BLD AUTO: 0.7 %
BILIRUB SERPL-MCNC: 0.2 MG/DL
BUN SERPL-MCNC: 18 MG/DL
CALCIUM SERPL-MCNC: 9.8 MG/DL
CHLORIDE SERPL-SCNC: 106 MMOL/L
CO2 SERPL-SCNC: 23 MMOL/L
CREAT SERPL-MCNC: 0.86 MG/DL
CRP SERPL-MCNC: 15 MG/L
EGFR: 74 ML/MIN/1.73M2
EOSINOPHIL # BLD AUTO: 0.12 K/UL
EOSINOPHIL NFR BLD AUTO: 1.6 %
ERYTHROCYTE [SEDIMENTATION RATE] IN BLOOD BY WESTERGREN METHOD: 47 MM/HR
GLUCOSE SERPL-MCNC: 83 MG/DL
HCT VFR BLD CALC: 42.1 %
HGB BLD-MCNC: 12.6 G/DL
IMM GRANULOCYTES NFR BLD AUTO: 0.3 %
LYMPHOCYTES # BLD AUTO: 2.49 K/UL
LYMPHOCYTES NFR BLD AUTO: 32.4 %
MAN DIFF?: NORMAL
MCHC RBC-ENTMCNC: 29.9 GM/DL
MCHC RBC-ENTMCNC: 30.4 PG
MCV RBC AUTO: 101.4 FL
MONOCYTES # BLD AUTO: 0.64 K/UL
MONOCYTES NFR BLD AUTO: 8.3 %
NEUTROPHILS # BLD AUTO: 4.37 K/UL
NEUTROPHILS NFR BLD AUTO: 56.7 %
PLATELET # BLD AUTO: 303 K/UL
POTASSIUM SERPL-SCNC: 4.2 MMOL/L
PROT SERPL-MCNC: 7.4 G/DL
RBC # BLD: 4.15 M/UL
RBC # FLD: 13.8 %
SODIUM SERPL-SCNC: 143 MMOL/L
VIT B12 SERPL-MCNC: 999 PG/ML
WBC # FLD AUTO: 7.69 K/UL

## 2023-05-16 NOTE — HISTORY OF PRESENT ILLNESS
[FreeTextEntry1] : She increased Prednisone to 5 mg sine 4/4/23.  Within 4-5 days, the shoulder pain improved.  It is not completely gone, but is significantly better.  It is not constant and is minimal.\par She feels fatigued.

## 2023-05-16 NOTE — ASSESSMENT
[FreeTextEntry1] : increase prednisone to 8 mg for 2 weeks.  If no improvement, decrease back down to 5 mg.

## 2023-06-06 ENCOUNTER — APPOINTMENT (OUTPATIENT)
Dept: HEART AND VASCULAR | Facility: CLINIC | Age: 68
End: 2023-06-06
Payer: MEDICARE

## 2023-06-06 VITALS
HEIGHT: 66 IN | BODY MASS INDEX: 28.93 KG/M2 | WEIGHT: 180 LBS | TEMPERATURE: 97.6 F | HEART RATE: 70 BPM | DIASTOLIC BLOOD PRESSURE: 84 MMHG | SYSTOLIC BLOOD PRESSURE: 133 MMHG | OXYGEN SATURATION: 98 % | RESPIRATION RATE: 16 BRPM

## 2023-06-06 PROCEDURE — 99214 OFFICE O/P EST MOD 30 MIN: CPT

## 2023-06-06 NOTE — HISTORY OF PRESENT ILLNESS
[FreeTextEntry1] : 68-year-old female with past medical history of intermittent hypertension and hyperlipidemia, ex-smoker and pulmonary nodule s/p lobectomy with Dr. Cortez, and now followed with Dr. Paulson. History of SVT on Toprol.  Since last visit, she has been feeling well. Her palpitations are controlled on MTP ER 25 mg PO daily. She denies chest pain, dyspnea, orthopnea, PND, edema.  \par \par Lab May 2023: B12 999; CMP WNL; CRP 15; CBC; ESR 47\par Labs March 2023: CBC WNL\par Labs July 2022: TG 85; Total chol 255; ; . CBC/CMP WNL. \par \par EKG 06/2022: NSR at 78 bpm without STT abnormalities. \par \par Prior cardiology notes:\par \par 2-D echocardiogram  - Jan 2021 - normal \par treadmill stress test - Jan 2021 - normal \par zio svt and pac and pvc < 1 %\par \par April 2021 - \par s/p surgery had stage 1 lung ca\par March 2021: zio worn 1 run of VT\par 18 runs of SVT in 9 days longest 14 beats. \par \par Jan 2021: outpatient cardiac monitor to assess PVC burden found to have 9 episodes of short SVT 10.2 seconds the lowest episode also found a rare PACs and PVCs (not on BB during monitor)\par \par April 2021 Cardiac CT  with normal coronaries.

## 2023-06-06 NOTE — DISCUSSION/SUMMARY
[Diet Modification] : diet modification [Exercise] : exercise [Weight Loss] : weight loss [SVT] : paroxysmal supraventricular tachycardia [___ Month(s)] : in [unfilled] month(s) [de-identified] :  ; stable [de-identified] : Controlled on MTP ER 25 mg PO daily - refilled today [FreeTextEntry4] : To get lipid panel in July 2023. To get repeat CT chest for lung surveillance at end of July as well. Rheum - PMR On prednisone 8 mg PO daily (for shoulder pain).

## 2023-06-23 NOTE — REASON FOR VISIT
Fay Hoff                                                                                                                  6/23/2023  MRN:   [id number]  YOB: 1967  PCP:                           Leslie Chris MD  Referring Physician: No ref. provider found  Treating Physician Name: Prosper Peraza MD      Reason for visit:  Chief Complaint   Patient presents with    Follow-up     Review status of disease   Toxicity check. Discussed treatment plan. Current problems:  Well-differentiated, grade 1, pancreatic neuroendocrine tumor, clinical stage T2 N0 M0  Elevated gastrin (patient on Protonix)  SMA focal severe stenosis, chronic per CT scan  Multiple comorbidities including diabetes mellitus obesity, renal insufficiency and severe hepatic steatosis  Family history of leukemia     Active and recent treatments:  Patient medically unfit for surgery  Lanreotide-11/2022    Interim History:  Patient presents to the clinic for a follow-up visit and for toxicity check and to review results of her blood work-up. Patient has been started on insulin but her glucose remains high. Patient states she feels less tired. Tolerating treatment without unexpected or severe side effects. During this visit patient's allergy, social, medical, surgical history and medications were reviewed and updated. Summary of Case/History:    Fay Hoff a 49 y. o.female is a patient who presents to the clinic to establish care and for further work-up and evaluation. Patient has multiple comorbidities including diabetes COPD hypertension morbid obesity. Patient initially presented to the emergency department with complaints of right lower quadrant pain radiating to the back. Work-up revealed acute kidney injury pyelonephritis. Patient CT scan without contrast also showed a possible solid-appearing lesion in the pancreatic uncinate process there was no biliary or pancreatic ductal dilatation noted.   There were [FreeTextEntry1] : 8/11/21: 65 yo F with a PMH of HTN, HLD, ex-smoker, paroxysmal SVT, Hx Pulmonary nodule s/p VATS 2/2021-path adenocarcinoma in situ, GERD, Lumbar radiculopathy, s/p L3-L5 laminectomy, left L5-S1 microdisectomy on 6/17/21. She returns to clinic for a post operative follow up appointment. Today she reports mild incisional pain however reports no radicular pain. She denies weakness of extremities, changes in sensation of extremities, urinary/fecal incontinence. \par \par 6/30/21: 66 year female with a PMH of HTN, HLD, ex-smoker, paroxysmal SVT, Hx Pulmonary nodule s/p VATS 2/2021-path adenocarcinoma in situ, GERD, Lumbar radiculopathy, now s/p L3-L5 laminectomy, left L5-S1 microdisectomy on 6/17/21. She presents to clinic for post operative follow up visit. She reports that she has left sided hip pain that radiates down her left mid thigh. She describes this pain similar to the one she had prior to surgery however it is not as severe as it use to be. In addition, she also complains of left foot numbness and that it feels cold. \par Ms. Finn is doing well during her post operative appointment. She does have complaints of left foot numbness/coldness however her foot was warm to the touch, pulses intact. Provided patient education that her pain is likely secondary from inflammation due to surgery and will likely get better in 2-3 months. Lastly, I recommend she follow up with Dr. Rodríguez for pain management.\par She will follow up in our clinic in 6 weeks. The patient understands the plan of care and is in agreement. All questions answered to patient satisfaction.\par \par \par \par \par 4/22/21: Huma comes to the office today for neurosurgical evaluation, imaging review for neurogenic claudication from lumbar stenosis. She continues to complain of low back pain radiating to bilateral lower extremities worse while walking. She also complains of weakness and heaviness of legs. Her pain is worse in the left leg, begins in the left hip and radiates along the posterolateral aspect of the leg. She denies numbness,tingling, bladder or bowel incontinence. She denies any new symptoms since her prior visit.\par \par 4/7/21 Ms Finn is a 66 year female with a PMH of HTN, HLD, ex-smoker, paroxysmal SVT, Hx Pulmonary nodule s/p VATS 2/2021-path adenocarcinoma in situ, GERD, Lumbar radiculopathy seen by Dr. Rodríguez, Pain management who presents to the office today for neurosurgical consultation due to long standing low back pain radiating to bilateral lower extremities, worse while walking. She complains of weakness and heaviness in the legs. Specifically, her pain is worse in the left leg. It begins in the left hip and radiates down to the left foot, specifically along the posterolateral aspect of the leg. She denies numbness/tingling. Denies bowel/bladder incontinence. She has undergone epidural steroid injections, trigger point injections, and physical therapy in the past with no alleviation of her symptoms. Her pain significantly impacts her mobility and her quality of life.

## 2023-07-24 ENCOUNTER — RESULT REVIEW (OUTPATIENT)
Age: 68
End: 2023-07-24

## 2023-07-24 ENCOUNTER — NON-APPOINTMENT (OUTPATIENT)
Age: 68
End: 2023-07-24

## 2023-07-24 ENCOUNTER — RESULT CHARGE (OUTPATIENT)
Age: 68
End: 2023-07-24

## 2023-07-24 ENCOUNTER — LABORATORY RESULT (OUTPATIENT)
Age: 68
End: 2023-07-24

## 2023-07-24 ENCOUNTER — APPOINTMENT (OUTPATIENT)
Dept: GERIATRICS | Facility: CLINIC | Age: 68
End: 2023-07-24
Payer: MEDICARE

## 2023-07-24 VITALS
HEART RATE: 70 BPM | SYSTOLIC BLOOD PRESSURE: 138 MMHG | WEIGHT: 181.4 LBS | TEMPERATURE: 98.8 F | DIASTOLIC BLOOD PRESSURE: 80 MMHG | OXYGEN SATURATION: 99 % | BODY MASS INDEX: 29.28 KG/M2

## 2023-07-24 DIAGNOSIS — M79.89 OTHER SPECIFIED SOFT TISSUE DISORDERS: ICD-10-CM

## 2023-07-24 DIAGNOSIS — R25.2 CRAMP AND SPASM: ICD-10-CM

## 2023-07-24 DIAGNOSIS — M81.0 AGE-RELATED OSTEOPOROSIS W/OUT CURRENT PATHOLOGICAL FRACTURE: ICD-10-CM

## 2023-07-24 DIAGNOSIS — R30.0 DYSURIA: ICD-10-CM

## 2023-07-24 DIAGNOSIS — E78.5 HYPERLIPIDEMIA, UNSPECIFIED: ICD-10-CM

## 2023-07-24 DIAGNOSIS — E61.1 IRON DEFICIENCY: ICD-10-CM

## 2023-07-24 DIAGNOSIS — M85.89 OTHER SPECIFIED DISORDERS OF BONE DENSITY AND STRUCTURE, MULTIPLE SITES: ICD-10-CM

## 2023-07-24 DIAGNOSIS — R92.2 INCONCLUSIVE MAMMOGRAM: ICD-10-CM

## 2023-07-24 DIAGNOSIS — I49.9 CARDIAC ARRHYTHMIA, UNSPECIFIED: ICD-10-CM

## 2023-07-24 DIAGNOSIS — K21.9 GASTRO-ESOPHAGEAL REFLUX DISEASE W/OUT ESOPHAGITIS: ICD-10-CM

## 2023-07-24 LAB
APPEARANCE: CLEAR
BILIRUBIN URINE: NEGATIVE
BLOOD URINE: NEGATIVE
COLOR: NORMAL
GLUCOSE QUALITATIVE U: NEGATIVE MG/DL
KETONES URINE: ABNORMAL MG/DL
LEUKOCYTE ESTERASE URINE: ABNORMAL
NITRITE URINE: NEGATIVE
PH URINE: 5.5
PROTEIN URINE: NEGATIVE MG/DL
SPECIFIC GRAVITY URINE: 1.02
UROBILINOGEN URINE: 0.2 MG/DL

## 2023-07-24 PROCEDURE — G0439: CPT

## 2023-07-25 ENCOUNTER — APPOINTMENT (OUTPATIENT)
Dept: RHEUMATOLOGY | Facility: CLINIC | Age: 68
End: 2023-07-25
Payer: MEDICARE

## 2023-07-25 VITALS
OXYGEN SATURATION: 98 % | HEART RATE: 70 BPM | DIASTOLIC BLOOD PRESSURE: 80 MMHG | HEIGHT: 65 IN | SYSTOLIC BLOOD PRESSURE: 110 MMHG | WEIGHT: 181 LBS | BODY MASS INDEX: 30.16 KG/M2

## 2023-07-25 PROBLEM — M79.89 LEG SWELLING: Status: ACTIVE | Noted: 2017-08-17

## 2023-07-25 PROBLEM — R30.0 DYSURIA: Status: ACTIVE | Noted: 2021-01-25

## 2023-07-25 PROBLEM — R92.2 DENSE BREASTS: Status: ACTIVE | Noted: 2020-03-02

## 2023-07-25 PROBLEM — I49.9 CARDIAC ARRHYTHMIA, UNSPECIFIED CARDIAC ARRHYTHMIA TYPE: Status: ACTIVE | Noted: 2021-01-15

## 2023-07-25 PROBLEM — E61.1 IRON DEFICIENCY: Status: ACTIVE | Noted: 2021-05-27

## 2023-07-25 PROBLEM — R25.2 MUSCLE CRAMPS: Status: ACTIVE | Noted: 2020-02-21

## 2023-07-25 PROBLEM — K21.9 GERD (GASTROESOPHAGEAL REFLUX DISEASE): Status: ACTIVE | Noted: 2019-01-21

## 2023-07-25 PROBLEM — E78.5 HYPERLIPIDEMIA: Status: ACTIVE | Noted: 2019-03-09

## 2023-07-25 PROCEDURE — 99215 OFFICE O/P EST HI 40 MIN: CPT

## 2023-07-25 NOTE — HISTORY OF PRESENT ILLNESS
[FreeTextEntry1] : She increased Prednisone to 8 mg (from 5 mg) for 2 months.  She no longer has shoulder pain.\par \par She denies headache, blurry vision, scalp and jaw tenderness.\par

## 2023-07-25 NOTE — REVIEW OF SYSTEMS
[Cough] : cough [As Noted in HPI] : as noted in HPI [Negative] : Heme/Lymph [FreeTextEntry9] : jose shoulder pain recently, prednisone increased to 8 mg daily with good results

## 2023-07-25 NOTE — HISTORY OF PRESENT ILLNESS
[No falls in past year] : Patient reported no falls in the past year [Completely Independent] : Completely independent. [Smoke Detector] : smoke detector [Carbon Monoxide Detector] : carbon monoxide detector [PMH Reviewed and Updated] : past medical history reviewed and updated [Medication and Allergies Reconciled] : medication and allergies reconciled [0] : 0 [Retired] : retired from work [Patient reported colon/rectal/cancer screening was normal] : Patient reported colon/rectal cancer screening was normal [Patient reported osteoporosis screening was abnormal] : Patient reported osteoporosis screening was abnormal [Patient reported breast cancer screening was normal] : Patient reported breast cancer screening was normal [Patient reported cervical cancer screening was normal] : Patient reported cervical cancer screening was normal [Over the Past 2 Weeks, Have You Felt Down, Depressed, or Hopeless?] : 1.) Over the past 2 weeks, have you felt down, depressed, or hopeless? No [Over the Past 2 Weeks, Have You Felt Little Interest or Pleasure Doing Things?] : 2.) Over the past 2 weeks, have you felt little interest or pleasure doing things? No [de-identified] : pain 0  [FreeTextEntry1] : Heaven presents today for her annual review\par \par she is feeling well at the present time\par she states that she did recently experience an increase in shoulder pain which responded well to in increase in prednisone to 8mg daily\par she is due for follow up with Dr stapleton tomorrow and feel like she is ready to start to very slowly decrease the prednisone\par \par \par \par there is a h/o RUL nodule s/p resection 2/2021- path revealed adenocarcinoma in situ, pt continues to follow with Dr Paulson and is due for CT scan tomorrow \par \par \par Heaven continues to follow with Dr Banuelos  every 6 months and is due next december 2023- she remains on low dose Metoprolol ER 25 mg daily and denies any palpitations\par \par gyn-pap done June 2022, due next July 2024\par \par optho-due\par \par \par bmd due-osteopenia seen in femur 2021-pt now taking steroids more chronically, rec BMD follow up\par Heaven states she experiences acid reflux about 2x/month, she cont on famotidine daily\par \par \par  [BreastSonogramDate] : 08/21 [TextBox_25] : due now for repeat [Mammogramdate] : 07/21 [TextBox_19] : rec 5 year follow up [FreeTextEntry3] : 05/23 [FreeTextEntry5] : 06/22 [FreeTextEntry6] : pap due 2024 [[id number]] : 0

## 2023-08-02 ENCOUNTER — RESULT REVIEW (OUTPATIENT)
Age: 68
End: 2023-08-02

## 2023-08-04 ENCOUNTER — NON-APPOINTMENT (OUTPATIENT)
Age: 68
End: 2023-08-04

## 2023-08-08 ENCOUNTER — APPOINTMENT (OUTPATIENT)
Dept: FAMILY MEDICINE | Facility: CLINIC | Age: 68
End: 2023-08-08
Payer: COMMERCIAL

## 2023-08-08 VITALS
HEART RATE: 84 BPM | SYSTOLIC BLOOD PRESSURE: 130 MMHG | DIASTOLIC BLOOD PRESSURE: 80 MMHG | BODY MASS INDEX: 30.16 KG/M2 | HEIGHT: 65 IN | WEIGHT: 181 LBS | OXYGEN SATURATION: 95 %
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
## 2023-08-08 PROCEDURE — 99214 OFFICE O/P EST MOD 30 MIN: CPT
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
## 2023-08-08 NOTE — HEALTH RISK ASSESSMENT
[Yes] : Yes [Monthly or less (1 pt)] : Monthly or less (1 point) [1 or 2 (0 pts)] : 1 or 2 (0 points) [Never (0 pts)] : Never (0 points) [No] : In the past 12 months have you used drugs other than those required for medical reasons? No [No falls in past year] : Patient reported no falls in the past year [0] : 2) Feeling down, depressed, or hopeless: Not at all (0) [Former] : Former [de-identified] : [facility] therapy [de-identified] : regular [de-identified] : 1981

## 2023-08-08 NOTE — PHYSICAL EXAM
[No Acute Distress] : no acute distress [Well Nourished] : well nourished [Well Developed] : well developed [Well-Appearing] : well-appearing [PERRL] : pupils equal round and reactive to light [EOMI] : extraocular movements intact [Normal Outer Ear/Nose] : the outer ears and nose were normal in appearance [Normal Oropharynx] : the oropharynx was normal [Supple] : supple [No Respiratory Distress] : no respiratory distress  [No Accessory Muscle Use] : no accessory muscle use [Normal Rate] : normal rate  [Regular Rhythm] : with a regular rhythm [Normal S1, S2] : normal S1 and S2 [No Edema] : there was no peripheral edema [No Spinal Tenderness] : no spinal tenderness [No Joint Swelling] : no joint swelling [Grossly Normal Strength/Tone] : grossly normal strength/tone [Coordination Grossly Intact] : coordination grossly intact [No Focal Deficits] : no focal deficits [Normal Gait] : normal gait [Normal] : affect was normal and insight and judgment were intact [de-identified] : Normal ROM [de-identified] : Ambulating without issues

## 2023-08-10 ENCOUNTER — RESULT REVIEW (OUTPATIENT)
Age: 68
End: 2023-08-10

## 2023-08-14 ENCOUNTER — APPOINTMENT (OUTPATIENT)
Dept: PAIN MANAGEMENT | Facility: CLINIC | Age: 68
End: 2023-08-14
Payer: COMMERCIAL

## 2023-08-14 ENCOUNTER — NON-APPOINTMENT (OUTPATIENT)
Age: 68
End: 2023-08-14

## 2023-08-14 VITALS
BODY MASS INDEX: 30.16 KG/M2 | DIASTOLIC BLOOD PRESSURE: 100 MMHG | HEIGHT: 65 IN | OXYGEN SATURATION: 99 % | SYSTOLIC BLOOD PRESSURE: 169 MMHG | WEIGHT: 181 LBS | HEART RATE: 73 BPM

## 2023-08-14 DIAGNOSIS — M25.50 PAIN IN UNSPECIFIED JOINT: ICD-10-CM

## 2023-08-14 DIAGNOSIS — M48.07 SPINAL STENOSIS, LUMBOSACRAL REGION: ICD-10-CM

## 2023-08-14 DIAGNOSIS — G89.29 PAIN IN UNSPECIFIED JOINT: ICD-10-CM

## 2023-08-14 PROCEDURE — 99214 OFFICE O/P EST MOD 30 MIN: CPT

## 2023-08-14 NOTE — PHYSICAL EXAM
[de-identified] : Constitutional: Well-developed, in no acute distress\par  Eyes: Pupil- Right: normal, Left: normal\par  Neck exam: Inspection normal\par  Respiratory: Normal effort, speaking in full sentences\par  Cardiovascular: Regular rate and rhythm\par  Vascular: Dorsal pedis pulses normal and equal bilaterally\par  Abdomen: Inspection normal, no distension\par  Skin: Inspection normal, no bruising noted\par  Musculoskeletal:\par  Lumbar Spine:   Gait: Antalgic\par  		Inspection: Normal curvature, no abnormal kyphosis or scoliosis\par  		Facet loading: pain bilaterally\par  		Palpation:\par  			Lumbar and paraspinal muscles: pain bilaterally\par  			Sacroiliac joint: no pain\par  			Greater trochanter: no pain\par  		Muscle Strength:\par  		Iliopsoas: 5/5 bilaterally\par  		Quadriceps: 5/5 bilaterally\par  		Hamstrings: 5/5 bilaterally\par  		Tibialis anterior: 5/5 bilaterally\par  		Extensor hallucis longus: 5/5 bilaterally\par  \par  		Sensation: normal and equal in bilateral lower extremities\par  \par  		Reflexes:\par  			Patellar reflex: 2+ bilaterally\par  			Ankle jerk reflex: 2+ bilaterally\par  		\par  		Straight leg raise: negative bilaterally\par  \par  Extremity: no edema noted\par  Neurological: Memory normal, AAO x 3, Cranial nerves II - XII grossly normal\par  Psychiatric: Appropriate mood and affect, oriented to time, place, person, and situation\par  \par  \par
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
## 2023-09-07 ENCOUNTER — APPOINTMENT (OUTPATIENT)
Dept: RHEUMATOLOGY | Facility: CLINIC | Age: 68
End: 2023-09-07

## 2023-09-11 ENCOUNTER — APPOINTMENT (OUTPATIENT)
Dept: RHEUMATOLOGY | Facility: CLINIC | Age: 68
End: 2023-09-11
Payer: MEDICARE

## 2023-09-11 VITALS
SYSTOLIC BLOOD PRESSURE: 130 MMHG | HEART RATE: 78 BPM | HEIGHT: 64 IN | BODY MASS INDEX: 30.9 KG/M2 | DIASTOLIC BLOOD PRESSURE: 80 MMHG | WEIGHT: 181 LBS | RESPIRATION RATE: 14 BRPM | OXYGEN SATURATION: 97 %

## 2023-09-11 PROCEDURE — 36415 COLL VENOUS BLD VENIPUNCTURE: CPT

## 2023-09-11 PROCEDURE — 99215 OFFICE O/P EST HI 40 MIN: CPT | Mod: 25

## 2023-09-12 ENCOUNTER — TRANSCRIPTION ENCOUNTER (OUTPATIENT)
Age: 68
End: 2023-09-12

## 2023-09-12 LAB
ALBUMIN SERPL ELPH-MCNC: 4.8 G/DL
ALP BLD-CCNC: 72 U/L
ALT SERPL-CCNC: 25 U/L
ANION GAP SERPL CALC-SCNC: 16 MMOL/L
AST SERPL-CCNC: 18 U/L
BASOPHILS # BLD AUTO: 0.05 K/UL
BASOPHILS NFR BLD AUTO: 0.5 %
BILIRUB SERPL-MCNC: 0.4 MG/DL
BUN SERPL-MCNC: 13 MG/DL
CALCIUM SERPL-MCNC: 9.8 MG/DL
CHLORIDE SERPL-SCNC: 98 MMOL/L
CO2 SERPL-SCNC: 24 MMOL/L
CREAT SERPL-MCNC: 0.89 MG/DL
CRP SERPL-MCNC: 5 MG/L
EGFR: 71 ML/MIN/1.73M2
EOSINOPHIL # BLD AUTO: 0.06 K/UL
EOSINOPHIL NFR BLD AUTO: 0.6 %
ERYTHROCYTE [SEDIMENTATION RATE] IN BLOOD BY WESTERGREN METHOD: 37 MM/HR
GLUCOSE SERPL-MCNC: 86 MG/DL
HCT VFR BLD CALC: 43.4 %
HGB BLD-MCNC: 13.6 G/DL
IMM GRANULOCYTES NFR BLD AUTO: 0.4 %
LYMPHOCYTES # BLD AUTO: 3.3 K/UL
LYMPHOCYTES NFR BLD AUTO: 32.4 %
MAN DIFF?: NORMAL
MCHC RBC-ENTMCNC: 31.3 GM/DL
MCHC RBC-ENTMCNC: 31.5 PG
MCV RBC AUTO: 100.5 FL
MONOCYTES # BLD AUTO: 0.59 K/UL
MONOCYTES NFR BLD AUTO: 5.8 %
NEUTROPHILS # BLD AUTO: 6.13 K/UL
NEUTROPHILS NFR BLD AUTO: 60.3 %
PLATELET # BLD AUTO: 337 K/UL
POTASSIUM SERPL-SCNC: 3.9 MMOL/L
PROT SERPL-MCNC: 7.4 G/DL
RBC # BLD: 4.32 M/UL
RBC # FLD: 15.2 %
SODIUM SERPL-SCNC: 138 MMOL/L
VIT B12 SERPL-MCNC: 1141 PG/ML
WBC # FLD AUTO: 10.17 K/UL

## 2023-09-27 ENCOUNTER — APPOINTMENT (OUTPATIENT)
Dept: PAIN MANAGEMENT | Facility: CLINIC | Age: 68
End: 2023-09-27
Payer: MEDICARE

## 2023-09-27 DIAGNOSIS — M47.812 SPONDYLOSIS W/OUT MYELOPATHY OR RADICULOPATHY, CERVICAL REGION: ICD-10-CM

## 2023-09-27 PROCEDURE — 99213 OFFICE O/P EST LOW 20 MIN: CPT | Mod: 95

## 2023-10-10 ENCOUNTER — APPOINTMENT (OUTPATIENT)
Dept: HEART AND VASCULAR | Facility: CLINIC | Age: 68
End: 2023-10-10
Payer: COMMERCIAL

## 2023-10-10 ENCOUNTER — NON-APPOINTMENT (OUTPATIENT)
Age: 68
End: 2023-10-10

## 2023-10-10 VITALS
TEMPERATURE: 97.8 F | WEIGHT: 179 LBS | SYSTOLIC BLOOD PRESSURE: 140 MMHG | HEART RATE: 78 BPM | HEIGHT: 64 IN | BODY MASS INDEX: 30.56 KG/M2 | OXYGEN SATURATION: 98 % | DIASTOLIC BLOOD PRESSURE: 90 MMHG | RESPIRATION RATE: 16 BRPM

## 2023-10-10 PROCEDURE — 99214 OFFICE O/P EST MOD 30 MIN: CPT

## 2023-10-10 PROCEDURE — 93000 ELECTROCARDIOGRAM COMPLETE: CPT

## 2023-10-13 ENCOUNTER — RESULT REVIEW (OUTPATIENT)
Age: 68
End: 2023-10-13

## 2023-10-13 ENCOUNTER — APPOINTMENT (OUTPATIENT)
Dept: GERIATRICS | Facility: CLINIC | Age: 68
End: 2023-10-13
Payer: COMMERCIAL

## 2023-10-13 VITALS
HEART RATE: 92 BPM | SYSTOLIC BLOOD PRESSURE: 128 MMHG | WEIGHT: 178 LBS | OXYGEN SATURATION: 98 % | BODY MASS INDEX: 30.55 KG/M2 | TEMPERATURE: 96.2 F | DIASTOLIC BLOOD PRESSURE: 64 MMHG

## 2023-10-13 PROCEDURE — 99214 OFFICE O/P EST MOD 30 MIN: CPT

## 2023-10-13 RX ORDER — FAMOTIDINE 40 MG/1
40 TABLET, FILM COATED ORAL DAILY
Qty: 90 | Refills: 1 | Status: ACTIVE | COMMUNITY
Start: 1900-01-01 | End: 1900-01-01

## 2023-10-17 DIAGNOSIS — B37.9 CANDIDIASIS, UNSPECIFIED: ICD-10-CM

## 2023-10-20 ENCOUNTER — TRANSCRIPTION ENCOUNTER (OUTPATIENT)
Age: 68
End: 2023-10-20

## 2023-10-30 ENCOUNTER — APPOINTMENT (OUTPATIENT)
Dept: GERIATRICS | Facility: CLINIC | Age: 68
End: 2023-10-30

## 2023-11-01 ENCOUNTER — APPOINTMENT (OUTPATIENT)
Dept: RHEUMATOLOGY | Facility: CLINIC | Age: 68
End: 2023-11-01
Payer: MEDICARE

## 2023-11-01 VITALS
HEIGHT: 65 IN | DIASTOLIC BLOOD PRESSURE: 80 MMHG | WEIGHT: 175 LBS | SYSTOLIC BLOOD PRESSURE: 130 MMHG | OXYGEN SATURATION: 97 % | RESPIRATION RATE: 16 BRPM | HEART RATE: 65 BPM | BODY MASS INDEX: 29.16 KG/M2

## 2023-11-01 DIAGNOSIS — R79.82 ELEVATED C-REACTIVE PROTEIN (CRP): ICD-10-CM

## 2023-11-01 DIAGNOSIS — M79.7 FIBROMYALGIA: ICD-10-CM

## 2023-11-01 PROCEDURE — G0008: CPT

## 2023-11-01 PROCEDURE — 90662 IIV NO PRSV INCREASED AG IM: CPT

## 2023-11-01 PROCEDURE — 99215 OFFICE O/P EST HI 40 MIN: CPT | Mod: 25

## 2023-11-01 RX ORDER — PREDNISONE 1 MG/1
1 TABLET ORAL DAILY
Refills: 0 | Status: DISCONTINUED | COMMUNITY
Start: 2022-01-06 | End: 2023-11-01

## 2023-11-01 RX ORDER — FLUCONAZOLE 150 MG/1
150 TABLET ORAL
Qty: 2 | Refills: 2 | Status: DISCONTINUED | COMMUNITY
Start: 2023-10-17 | End: 2023-11-01

## 2023-11-09 ENCOUNTER — TRANSCRIPTION ENCOUNTER (OUTPATIENT)
Age: 68
End: 2023-11-09

## 2023-11-18 PROBLEM — R79.82 ELEVATED C-REACTIVE PROTEIN (CRP): Status: ACTIVE | Noted: 2019-03-09

## 2023-11-18 PROBLEM — M79.7 FIBROMYALGIA: Status: ACTIVE | Noted: 2019-11-01

## 2023-12-01 ENCOUNTER — LABORATORY RESULT (OUTPATIENT)
Age: 68
End: 2023-12-01

## 2023-12-06 ENCOUNTER — APPOINTMENT (OUTPATIENT)
Dept: RHEUMATOLOGY | Facility: CLINIC | Age: 68
End: 2023-12-06
Payer: MEDICARE

## 2023-12-06 VITALS
BODY MASS INDEX: 27.66 KG/M2 | HEIGHT: 65 IN | DIASTOLIC BLOOD PRESSURE: 74 MMHG | OXYGEN SATURATION: 98 % | HEART RATE: 79 BPM | SYSTOLIC BLOOD PRESSURE: 126 MMHG | WEIGHT: 166 LBS

## 2023-12-06 DIAGNOSIS — M35.3 POLYMYALGIA RHEUMATICA: ICD-10-CM

## 2023-12-06 DIAGNOSIS — D75.89 OTHER SPECIFIED DISEASES OF BLOOD AND BLOOD-FORMING ORGANS: ICD-10-CM

## 2023-12-06 DIAGNOSIS — E53.8 DEFICIENCY OF OTHER SPECIFIED B GROUP VITAMINS: ICD-10-CM

## 2023-12-06 PROCEDURE — 99215 OFFICE O/P EST HI 40 MIN: CPT

## 2023-12-28 RX ORDER — FOLIC ACID 1 MG/1
1 TABLET ORAL
Qty: 90 | Refills: 3 | Status: ACTIVE | COMMUNITY
Start: 2023-07-25

## 2023-12-28 NOTE — ASSESSMENT
[FreeTextEntry1] : stay on Prednisone 2.5 mg until next visit  The patient's current disease and medications (MTX, Prednisone) necessitate close follow up to assess for progression of the disease and laboratory testing to assess for drug toxicity and response to treatment. Failure to follow up in a timely manner can result in laboratory abnormalities, poorly controlled disease, medication side effects or infectious complications.

## 2023-12-28 NOTE — HISTORY OF PRESENT ILLNESS
[FreeTextEntry1] : She had cervical surgery at [facility] by Dr. Cherry on 11/9 at [facility].  She had a f/u on 12/1/23. She was given oxycodone, which she took at night for a week.  She was very constipated.and started taking colace after 3 days.  On day 10 she finally had a bowel movement.  Her body feels sore.  When she overdoes it, her neck and B/L shoulder hurt..  Dr. Cherry advised her to take it easy.  She is taking Prednisone 2.5 mg daily since 11/1/23 and MTX 6 tabs.

## 2023-12-31 PROBLEM — Z23 NEED FOR VACCINATION WITH 13-POLYVALENT PNEUMOCOCCAL CONJUGATE VACCINE: Status: ACTIVE | Noted: 2019-04-25

## 2024-01-23 ENCOUNTER — NON-APPOINTMENT (OUTPATIENT)
Age: 69
End: 2024-01-23

## 2024-01-24 ENCOUNTER — LABORATORY RESULT (OUTPATIENT)
Age: 69
End: 2024-01-24

## 2024-01-25 ENCOUNTER — APPOINTMENT (OUTPATIENT)
Dept: PULMONOLOGY | Facility: CLINIC | Age: 69
End: 2024-01-25
Payer: MEDICARE

## 2024-01-25 VITALS
DIASTOLIC BLOOD PRESSURE: 79 MMHG | SYSTOLIC BLOOD PRESSURE: 153 MMHG | TEMPERATURE: 97 F | BODY MASS INDEX: 27.66 KG/M2 | OXYGEN SATURATION: 95 % | HEART RATE: 97 BPM | HEIGHT: 65 IN | WEIGHT: 166 LBS

## 2024-01-25 DIAGNOSIS — J44.9 CHRONIC OBSTRUCTIVE PULMONARY DISEASE, UNSPECIFIED: ICD-10-CM

## 2024-01-25 DIAGNOSIS — C34.11 MALIGNANT NEOPLASM OF UPPER LOBE, RIGHT BRONCHUS OR LUNG: ICD-10-CM

## 2024-01-25 PROCEDURE — 99215 OFFICE O/P EST HI 40 MIN: CPT

## 2024-02-22 NOTE — BRIEF OPERATIVE NOTE - NSICDXBRIEFPROCEDURE_GEN_ALL_CORE_FT
PROCEDURES:  Lobectomy, lung, upper lobe, right, robot-assisted 10-Feb-2021 13:16:15  Addison Lerma  
Our Emergency Department Referral Coordinators will be reaching out to you in the next 24-48 hours from 9:00am to 5:00pm with a follow up appointment. Please expect a phone call from the hospital in that time frame. If you do not receive a call or if you have any questions or concerns, you can reach them at [phone].    Palpitations    A palpitation is the feeling that your heartbeat is irregular or is faster than normal. It may feel like your heart is fluttering or skipping a beat. They may be caused by many things, including smoking, caffeine, alcohol, stress, and certain medicines. Although most causes of palpitations are not serious, palpitations can be a sign of a serious medical problem. Avoid caffeine, alcohol, and tobacco products at home. Try to reduce stress and anxiety and make sure to get plenty of rest.     SEEK IMMEDIATE MEDICAL CARE IF YOU HAVE ANY OF THE FOLLOWING SYMPTOMS: chest pain, shortness of breath, severe headache, dizziness/lightheadedness, or fainting.      Shortness of breath    Shortness of breath (dyspnea) means you have trouble breathing and could indicate a medical problem. Causes include lung disease, heart disease, low amount of red blood cells (anemia), poor physical fitness, being overweight, smoking, etc. Your health care provider today may not be able to find a cause for your shortness of breath after your exam. In this case, it is important to have a follow-up exam with your primary care physician as instructed. If medicines were prescribed, take them as directed for the full length of time directed. Refrain from tobacco products.    SEEK IMMEDIATE MEDICAL CARE IF YOU HAVE ANY OF THE FOLLOWING SYMPTOMS: worsening shortness of breath, chest pain, back pain, abdominal pain, fever, coughing up blood, lightheadedness/dizziness.

## 2024-03-11 ENCOUNTER — APPOINTMENT (OUTPATIENT)
Dept: PAIN MANAGEMENT | Facility: CLINIC | Age: 69
End: 2024-03-11
Payer: COMMERCIAL

## 2024-03-11 VITALS
HEIGHT: 65 IN | SYSTOLIC BLOOD PRESSURE: 154 MMHG | BODY MASS INDEX: 27.66 KG/M2 | WEIGHT: 166 LBS | DIASTOLIC BLOOD PRESSURE: 92 MMHG

## 2024-03-11 DIAGNOSIS — G89.4 CHRONIC PAIN SYNDROME: ICD-10-CM

## 2024-03-11 PROCEDURE — 99214 OFFICE O/P EST MOD 30 MIN: CPT

## 2024-03-11 RX ORDER — UMECLIDINIUM BROMIDE AND VILANTEROL TRIFENATATE 62.5; 25 UG/1; UG/1
62.5-25 POWDER RESPIRATORY (INHALATION)
Refills: 0 | Status: ACTIVE | COMMUNITY
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
## 2024-03-11 NOTE — PHYSICAL EXAM
[de-identified] : Constitutional: Well-developed, in no acute distress  Musculoskeletal: Lumbar Spine:   Gait: Antalgic 		Inspection: Normal curvature, no abnormal kyphosis or scoliosis 		Facet loading: pain bilaterally 		Palpation: 			Lumbar and paraspinal muscles: pain bilaterally 			Sacroiliac joint: no pain 			Greater trochanter: no pain 		Muscle Strength: 		Iliopsoas: 5/5 bilaterally 		Quadriceps: 5/5 bilaterally 		Hamstrings: 5/5 bilaterally 		Tibialis anterior: 5/5 bilaterally 		Extensor hallucis longus: 5/5 bilaterally  		Sensation: normal and equal in bilateral lower extremities  Extremity: no edema noted Neurological: Memory normal, AAO x 3, Cranial nerves II - XII grossly normal Psychiatric: Appropriate mood and affect, oriented to time, place, person, and situation
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
## 2024-03-18 ENCOUNTER — RESULT REVIEW (OUTPATIENT)
Age: 69
End: 2024-03-18

## 2024-03-21 ENCOUNTER — APPOINTMENT (OUTPATIENT)
Dept: PAIN MANAGEMENT | Facility: CLINIC | Age: 69
End: 2024-03-21
Payer: COMMERCIAL

## 2024-03-21 VITALS
RESPIRATION RATE: 16 BRPM | SYSTOLIC BLOOD PRESSURE: 157 MMHG | DIASTOLIC BLOOD PRESSURE: 94 MMHG | OXYGEN SATURATION: 99 % | HEART RATE: 68 BPM

## 2024-03-21 PROCEDURE — 64493 INJ PARAVERT F JNT L/S 1 LEV: CPT

## 2024-03-21 PROCEDURE — 64494 INJ PARAVERT F JNT L/S 2 LEV: CPT

## 2024-03-21 NOTE — PROCEDURE
[FreeTextEntry1] : Right L3, L4, and L5 Medial Branch Diagnostic Block under Fluoroscopic guidance  The patient was placed in the prone position on the fluoroscopic table with a pillow under the abdomen.  Routine monitors were applied.  The back was draped in the usual sterile fashion and sterile technique was adhered to throughout the entire procedure.  The L4 vertebral body was identified under fluoroscopic guidance.  The vertebral body end plates were aligned and the junction of the superior articular process and the base of the transverse process was brought into view using an oblique angulation of the C-arm.  The skin and subcutaneous tissues were infiltrated with 1% Lidocaine.  A 22-gauge 3.5" spinal needle was inserted at the angle between the superior articular process and the base of the transverse process.  Oblique angulation was used to guide the needle into position to approximate the L3 medial branch. The same sequence of events was performed at the L5 vertebral body for the L4 medial branch. Following this the L5 medial branch was identified at the lumbosacral junction and a 22-gauge 3.5" was guided fluoroscopically using AP view to the right lumbosacral junction.  Approximately 1.0 cc of 0.25% Bupivacaine was injected at each level.   The patient tolerated the procedure well.  There was no neurological deficit post procedure.  The patient went to recovery room in stable condition.  Discharge instructions were given to the patient.

## 2024-03-25 ENCOUNTER — APPOINTMENT (OUTPATIENT)
Dept: PAIN MANAGEMENT | Facility: CLINIC | Age: 69
End: 2024-03-25
Payer: COMMERCIAL

## 2024-03-25 VITALS
DIASTOLIC BLOOD PRESSURE: 88 MMHG | WEIGHT: 166 LBS | HEIGHT: 65 IN | SYSTOLIC BLOOD PRESSURE: 137 MMHG | BODY MASS INDEX: 27.66 KG/M2

## 2024-03-25 PROCEDURE — 99214 OFFICE O/P EST MOD 30 MIN: CPT

## 2024-03-25 NOTE — PHYSICAL EXAM
[de-identified] : Constitutional: Well-developed, in no acute distress  Musculoskeletal: Lumbar Spine:   Gait: Antalgic 		Inspection: Normal curvature, no abnormal kyphosis or scoliosis 		Facet loading: pain bilaterally 		Palpation: 			Lumbar and paraspinal muscles: pain bilaterally 			Sacroiliac joint: no pain 			Greater trochanter: no pain 		Muscle Strength: 		Iliopsoas: 5/5 bilaterally 		Quadriceps: 5/5 bilaterally 		Hamstrings: 5/5 bilaterally 		Tibialis anterior: 5/5 bilaterally 		Extensor hallucis longus: 5/5 bilaterally  		Sensation: normal and equal in bilateral lower extremities  Extremity: no edema noted Neurological: Memory normal, AAO x 3, Cranial nerves II - XII grossly normal Psychiatric: Appropriate mood and affect, oriented to time, place, person, and situation
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
## 2024-03-25 NOTE — REASON FOR VISIT
Patient is scheduled for a colonoscopy with Dr SUAREZ on 7/20/23. Please send Nulytely prep to patient's selected pharmacy.   Thank you, GI Preadmit Surgery Scheduler 
[Follow-Up Visit] : a follow-up pain management visit
[Follow-Up Visit] : a follow-up pain management visit
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
## 2024-03-25 NOTE — PHYSICAL EXAM
[de-identified] : Constitutional: Well-developed, in no acute distress  Musculoskeletal: Lumbar Spine:   Gait: Antalgic 		Inspection: Normal curvature, no abnormal kyphosis or scoliosis 		Facet loading: pain bilaterally 		Palpation: 			Lumbar and paraspinal muscles: pain bilaterally 			Sacroiliac joint: no pain 			Greater trochanter: no pain 		Muscle Strength: 		Iliopsoas: 5/5 bilaterally 		Quadriceps: 5/5 bilaterally 		Hamstrings: 5/5 bilaterally 		Tibialis anterior: 5/5 bilaterally 		Extensor hallucis longus: 5/5 bilaterally  		Sensation: normal and equal in bilateral lower extremities  Extremity: no edema noted Neurological: Memory normal, AAO x 3, Cranial nerves II - XII grossly normal Psychiatric: Appropriate mood and affect, oriented to time, place, person, and situation

## 2024-03-28 ENCOUNTER — APPOINTMENT (OUTPATIENT)
Dept: PAIN MANAGEMENT | Facility: CLINIC | Age: 69
End: 2024-03-28
Payer: COMMERCIAL

## 2024-03-28 VITALS
RESPIRATION RATE: 16 BRPM | SYSTOLIC BLOOD PRESSURE: 138 MMHG | OXYGEN SATURATION: 99 % | HEART RATE: 71 BPM | DIASTOLIC BLOOD PRESSURE: 76 MMHG

## 2024-03-28 PROCEDURE — 64493 INJ PARAVERT F JNT L/S 1 LEV: CPT

## 2024-03-28 PROCEDURE — 64494 INJ PARAVERT F JNT L/S 2 LEV: CPT

## 2024-04-01 ENCOUNTER — APPOINTMENT (OUTPATIENT)
Dept: PAIN MANAGEMENT | Facility: CLINIC | Age: 69
End: 2024-04-01
Payer: COMMERCIAL

## 2024-04-01 VITALS
SYSTOLIC BLOOD PRESSURE: 157 MMHG | WEIGHT: 166 LBS | BODY MASS INDEX: 27.66 KG/M2 | DIASTOLIC BLOOD PRESSURE: 91 MMHG | HEIGHT: 65 IN

## 2024-04-01 PROCEDURE — 99214 OFFICE O/P EST MOD 30 MIN: CPT
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
## 2024-04-01 NOTE — PHYSICAL EXAM
[de-identified] : Constitutional: Well-developed, in no acute distress  Musculoskeletal: Lumbar Spine:   Gait: Antalgic 		Inspection: Normal curvature, no abnormal kyphosis or scoliosis 		Facet loading: pain bilaterally 		Palpation: 			Lumbar and paraspinal muscles: pain bilaterally 			Sacroiliac joint: no pain 			Greater trochanter: no pain 		Muscle Strength: 		Iliopsoas: 5/5 bilaterally 		Quadriceps: 5/5 bilaterally 		Hamstrings: 5/5 bilaterally 		Tibialis anterior: 5/5 bilaterally 		Extensor hallucis longus: 5/5 bilaterally  		Sensation: normal and equal in bilateral lower extremities  Extremity: no edema noted Neurological: Memory normal, AAO x 3, Cranial nerves II - XII grossly normal Psychiatric: Appropriate mood and affect, oriented to time, place, person, and situation
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
## 2024-04-04 ENCOUNTER — APPOINTMENT (OUTPATIENT)
Dept: PAIN MANAGEMENT | Facility: HOSPITAL | Age: 69
End: 2024-04-04

## 2024-04-05 ENCOUNTER — TRANSCRIPTION ENCOUNTER (OUTPATIENT)
Age: 69
End: 2024-04-05
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
## 2024-04-11 DIAGNOSIS — T36.95XA CANDIDIASIS, UNSPECIFIED: ICD-10-CM

## 2024-04-11 DIAGNOSIS — B37.9 CANDIDIASIS, UNSPECIFIED: ICD-10-CM

## 2024-04-12 RX ORDER — PREDNISONE 2.5 MG/1
2.5 TABLET ORAL
Qty: 90 | Refills: 2 | Status: DISCONTINUED | COMMUNITY
Start: 2023-11-01 | End: 2024-04-12

## 2024-04-12 RX ORDER — ACETAMINOPHEN 325 MG/1
325 TABLET, FILM COATED ORAL
Refills: 0 | Status: ACTIVE | COMMUNITY

## 2024-04-12 RX ORDER — AZITHROMYCIN 250 MG/1
250 TABLET, FILM COATED ORAL
Qty: 1 | Refills: 0 | Status: DISCONTINUED | COMMUNITY
Start: 2024-01-25 | End: 2024-04-12

## 2024-04-12 RX ORDER — FLUCONAZOLE 150 MG/1
150 TABLET ORAL
Qty: 2 | Refills: 1 | Status: DISCONTINUED | COMMUNITY
Start: 2024-04-11 | End: 2024-04-12

## 2024-04-12 RX ORDER — DOXYCYCLINE HYCLATE 100 MG/1
100 TABLET ORAL ONCE
Qty: 2 | Refills: 0 | Status: DISCONTINUED | COMMUNITY
Start: 2024-04-09 | End: 2024-04-12

## 2024-04-16 ENCOUNTER — APPOINTMENT (OUTPATIENT)
Dept: HEART AND VASCULAR | Facility: CLINIC | Age: 69
End: 2024-04-16
Payer: MEDICARE

## 2024-04-16 VITALS
HEIGHT: 65 IN | WEIGHT: 155 LBS | OXYGEN SATURATION: 99 % | RESPIRATION RATE: 16 BRPM | DIASTOLIC BLOOD PRESSURE: 80 MMHG | BODY MASS INDEX: 25.83 KG/M2 | HEART RATE: 71 BPM | SYSTOLIC BLOOD PRESSURE: 118 MMHG

## 2024-04-16 PROCEDURE — 99214 OFFICE O/P EST MOD 30 MIN: CPT

## 2024-04-16 PROCEDURE — 93000 ELECTROCARDIOGRAM COMPLETE: CPT

## 2024-04-16 PROCEDURE — G2211 COMPLEX E/M VISIT ADD ON: CPT

## 2024-04-16 RX ORDER — METHOTREXATE 2.5 MG/1
2.5 TABLET ORAL
Qty: 96 | Refills: 1 | Status: DISCONTINUED | COMMUNITY
Start: 2023-07-25 | End: 2024-04-16

## 2024-04-16 RX ORDER — PREDNISONE 5 MG/1
5 TABLET ORAL DAILY
Qty: 225 | Refills: 3 | Status: DISCONTINUED | COMMUNITY
Start: 2020-01-10 | End: 2020-05-06

## 2024-04-16 RX ORDER — METHOTREXATE 2.5 MG/1
2.5 TABLET ORAL
Refills: 0 | Status: ACTIVE | COMMUNITY

## 2024-04-16 RX ORDER — METOPROLOL SUCCINATE 25 MG/1
25 TABLET, EXTENDED RELEASE ORAL DAILY
Qty: 90 | Refills: 3 | Status: ACTIVE | COMMUNITY
Start: 2021-04-01 | End: 1900-01-01

## 2024-04-16 NOTE — HISTORY OF PRESENT ILLNESS
[FreeTextEntry1] : 69-year-old female with past medical history of intermittent hypertension and hyperlipidemia, ex-smoker and pulmonary nodule s/p lobectomy with Dr. Cortez, and now followed with Dr. Paulson. History of SVT on Toprol.  s/p Anterior Cervical Discectomy and Fusion C5-C7 on 11/09/2023. Since last visit, she has been feeling well. Her palpitations are controlled on MTP ER 25 mg PO daily. She denies chest pain, dyspnea, orthopnea, PND, edema.    Labs Sept 2023: CBC WNL; CMP WNL; CRP 5; B12 1141; ESR 37 Labs July 24 2023: HDL 87; .6; ; Total chol 233 Lab May 2023: B12 999; CMP WNL; CRP 15; CBC; ESR 47 Labs March 2023: CBC WNL Labs July 2022: TG 85; Total chol 255; ; . CBC/CMP WNL.   EKG today : NSR at 73 bpm without STT abnormalities.  EKG 06/2022: NSR at 78 bpm without STT abnormalities.   Prior cardiology notes:  2-D echocardiogram  - Jan 2021 - normal  treadmill stress test - Jan 2021 - normal  zio svt and pac and pvc < 1 %  April 2021 -  s/p surgery had stage 1 lung ca March 2021: zio worn 1 run of VT 18 runs of SVT in 9 days longest 14 beats.   Jan 2021: outpatient cardiac monitor to assess PVC burden found to have 9 episodes of short SVT 10.2 seconds the lowest episode also found a rare PACs and PVCs (not on BB during monitor)  April 2021 Cardiac CT  with normal coronaries.

## 2024-04-16 NOTE — DISCUSSION/SUMMARY
[Diet Modification] : diet modification [Exercise] : exercise [Weight Loss] : weight loss [SVT] : paroxysmal supraventricular tachycardia [___ Month(s)] : in [unfilled] month(s) [de-identified] : .6 per July 2023 ; stable [de-identified] : Controlled on MTP ER 25 mg PO daily  [FreeTextEntry4] : Labs to be done at [facility] in July

## 2024-04-18 ENCOUNTER — APPOINTMENT (OUTPATIENT)
Dept: PAIN MANAGEMENT | Facility: CLINIC | Age: 69
End: 2024-04-18
Payer: COMMERCIAL

## 2024-04-18 PROCEDURE — 99214 OFFICE O/P EST MOD 30 MIN: CPT

## 2024-04-18 NOTE — REASON FOR VISIT
[Follow-Up Visit] : a follow-up pain management visit [FreeTextEntry2] : pain level 4 Muscle Hinge Flap Text: The defect edges were debeveled with a #15 scalpel blade.  Given the size, depth and location of the defect and the proximity to free margins a muscle hinge flap was deemed most appropriate.  Using a sterile surgical marker, an appropriate hinge flap was drawn incorporating the defect. The area thus outlined was incised with a #15 scalpel blade.  The skin margins were undermined to an appropriate distance in all directions utilizing iris scissors.

## 2024-04-26 ENCOUNTER — TRANSCRIPTION ENCOUNTER (OUTPATIENT)
Age: 69
End: 2024-04-26

## 2024-04-29 ENCOUNTER — APPOINTMENT (OUTPATIENT)
Dept: PAIN MANAGEMENT | Facility: CLINIC | Age: 69
End: 2024-04-29
Payer: COMMERCIAL

## 2024-04-29 VITALS
DIASTOLIC BLOOD PRESSURE: 82 MMHG | RESPIRATION RATE: 16 BRPM | HEART RATE: 74 BPM | SYSTOLIC BLOOD PRESSURE: 140 MMHG | OXYGEN SATURATION: 98 %

## 2024-04-29 PROCEDURE — 64494 INJ PARAVERT F JNT L/S 2 LEV: CPT

## 2024-04-29 PROCEDURE — 64493 INJ PARAVERT F JNT L/S 1 LEV: CPT

## 2024-05-02 ENCOUNTER — APPOINTMENT (OUTPATIENT)
Dept: PAIN MANAGEMENT | Facility: CLINIC | Age: 69
End: 2024-05-02
Payer: COMMERCIAL

## 2024-05-02 VITALS
SYSTOLIC BLOOD PRESSURE: 140 MMHG | BODY MASS INDEX: 25.83 KG/M2 | HEIGHT: 65 IN | WEIGHT: 155 LBS | DIASTOLIC BLOOD PRESSURE: 80 MMHG

## 2024-05-02 DIAGNOSIS — M54.2 CERVICALGIA: ICD-10-CM

## 2024-05-02 PROCEDURE — G2211 COMPLEX E/M VISIT ADD ON: CPT | Mod: NC,1L

## 2024-05-02 PROCEDURE — 99214 OFFICE O/P EST MOD 30 MIN: CPT

## 2024-05-02 NOTE — PHYSICAL EXAM
[de-identified] : Constitutional: Well-developed, in no acute distress  Musculoskeletal: Lumbar Spine:   Gait: Antalgic 		Inspection: Normal curvature, no abnormal kyphosis or scoliosis 		Facet loading: pain bilaterally 		Palpation: 			Lumbar and paraspinal muscles: pain bilaterally 			Sacroiliac joint: no pain 			Greater trochanter: no pain 		Muscle Strength: 		Iliopsoas: 5/5 bilaterally 		Quadriceps: 5/5 bilaterally 		Hamstrings: 5/5 bilaterally 		Tibialis anterior: 5/5 bilaterally 		Extensor hallucis longus: 5/5 bilaterally  		Sensation: normal and equal in bilateral lower extremities  Extremity: no edema noted Neurological: Memory normal, AAO x 3, Cranial nerves II - XII grossly normal Psychiatric: Appropriate mood and affect, oriented to time, place, person, and situation
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
## 2024-05-06 ENCOUNTER — APPOINTMENT (OUTPATIENT)
Dept: PAIN MANAGEMENT | Facility: CLINIC | Age: 69
End: 2024-05-06
Payer: COMMERCIAL

## 2024-05-06 VITALS
HEART RATE: 70 BPM | DIASTOLIC BLOOD PRESSURE: 86 MMHG | SYSTOLIC BLOOD PRESSURE: 143 MMHG | RESPIRATION RATE: 16 BRPM | OXYGEN SATURATION: 98 %

## 2024-05-06 PROCEDURE — 64494 INJ PARAVERT F JNT L/S 2 LEV: CPT

## 2024-05-06 PROCEDURE — 64493 INJ PARAVERT F JNT L/S 1 LEV: CPT

## 2024-05-06 NOTE — PROCEDURE
[FreeTextEntry1] : Left L3, L4, and L5 Medial Branch Diagnostic Block under Fluoroscopic guidance  The patient was placed in the prone position on the fluoroscopic table with a pillow under the abdomen.  Routine monitors were applied.  The back was draped in the usual sterile fashion and sterile technique was adhered to throughout the entire procedure.  The L4 vertebral body was identified under fluoroscopic guidance.  The vertebral body end plates were aligned and the junction of the superior articular process and the base of the transverse process was brought into view using an oblique angulation of the C-arm.  The skin and subcutaneous tissues were infiltrated with 1% Lidocaine.  A 22-gauge 3.5" spinal needle was inserted at the angle between the superior articular process and the base of the transverse process.  Oblique angulation was used to guide the needle into position to approximate the L3 medial branch. The same sequence of events was performed at the L5 vertebral body for the L4 medial branch. Following this the L5 medial branch was identified at the lumbosacral junction and a 22-gauge 3.5" was guided fluoroscopically using AP view to the left lumbosacral junction.  Approximately 1.0 cc of 0.25% Bupivacaine was injected at each level.   The patient tolerated the procedure well.  There was no neurological deficit post procedure.  The patient went to recovery room in stable condition.  Discharge instructions were given to the patient.

## 2024-05-08 ENCOUNTER — APPOINTMENT (OUTPATIENT)
Dept: PAIN MANAGEMENT | Facility: CLINIC | Age: 69
End: 2024-05-08
Payer: COMMERCIAL

## 2024-05-08 PROCEDURE — 99214 OFFICE O/P EST MOD 30 MIN: CPT

## 2024-05-08 PROCEDURE — G2211 COMPLEX E/M VISIT ADD ON: CPT | Mod: NC,1L

## 2024-05-09 ENCOUNTER — APPOINTMENT (OUTPATIENT)
Dept: PAIN MANAGEMENT | Facility: CLINIC | Age: 69
End: 2024-05-09
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
## 2024-05-10 NOTE — PHYSICAL EXAM
[de-identified] : Constitutional: Well-developed, in no acute distress  Musculoskeletal: Lumbar Spine:   Gait: Antalgic 		Inspection: Normal curvature, no abnormal kyphosis or scoliosis 		Facet loading: pain bilaterally 		Palpation: 			Lumbar and paraspinal muscles: pain bilaterally 			Sacroiliac joint: no pain 			Greater trochanter: no pain 		Muscle Strength: 		Iliopsoas: 5/5 bilaterally 		Quadriceps: 5/5 bilaterally 		Hamstrings: 5/5 bilaterally 		Tibialis anterior: 5/5 bilaterally 		Extensor hallucis longus: 5/5 bilaterally  		Sensation: normal and equal in bilateral lower extremities  Extremity: no edema noted Neurological: Memory normal, AAO x 3, Cranial nerves II - XII grossly normal Psychiatric: Appropriate mood and affect, oriented to time, place, person, and situation
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
## 2024-05-13 ENCOUNTER — TRANSCRIPTION ENCOUNTER (OUTPATIENT)
Age: 69
End: 2024-05-13

## 2024-05-21 ENCOUNTER — RESULT REVIEW (OUTPATIENT)
Age: 69
End: 2024-05-21

## 2024-05-23 ENCOUNTER — TRANSCRIPTION ENCOUNTER (OUTPATIENT)
Age: 69
End: 2024-05-23

## 2024-05-23 ENCOUNTER — APPOINTMENT (OUTPATIENT)
Dept: PAIN MANAGEMENT | Facility: HOSPITAL | Age: 69
End: 2024-05-23

## 2024-05-23 ENCOUNTER — APPOINTMENT (OUTPATIENT)
Dept: PAIN MANAGEMENT | Facility: CLINIC | Age: 69
End: 2024-05-23

## 2024-05-29 ENCOUNTER — RESULT REVIEW (OUTPATIENT)
Age: 69
End: 2024-05-29

## 2024-05-29 ENCOUNTER — APPOINTMENT (OUTPATIENT)
Dept: GERIATRICS | Facility: CLINIC | Age: 69
End: 2024-05-29
Payer: MEDICARE

## 2024-05-29 ENCOUNTER — TRANSCRIPTION ENCOUNTER (OUTPATIENT)
Age: 69
End: 2024-05-29

## 2024-05-29 VITALS — WEIGHT: 155.8 LBS | BODY MASS INDEX: 25.93 KG/M2

## 2024-05-29 VITALS
RESPIRATION RATE: 16 BRPM | HEART RATE: 78 BPM | OXYGEN SATURATION: 98 % | DIASTOLIC BLOOD PRESSURE: 81 MMHG | SYSTOLIC BLOOD PRESSURE: 131 MMHG | TEMPERATURE: 99.1 F

## 2024-05-29 DIAGNOSIS — R21 RASH AND OTHER NONSPECIFIC SKIN ERUPTION: ICD-10-CM

## 2024-05-29 PROCEDURE — 99213 OFFICE O/P EST LOW 20 MIN: CPT

## 2024-05-29 RX ORDER — FLUCONAZOLE 150 MG/1
150 TABLET ORAL AS DIRECTED
Qty: 3 | Refills: 0 | Status: ACTIVE | COMMUNITY
Start: 2024-05-29 | End: 1900-01-01

## 2024-05-29 RX ORDER — CEPHALEXIN 500 MG/1
500 CAPSULE ORAL 4 TIMES DAILY
Qty: 28 | Refills: 0 | Status: ACTIVE | COMMUNITY
Start: 2024-05-29 | End: 1900-01-01

## 2024-05-29 NOTE — HISTORY OF PRESENT ILLNESS
[FreeTextEntry8] : Noticed itching on her lower back around the 21st.  Family looked and there was a rash in the area.  It has gotten bigger over time.  +Itching. Has not tried anything for treatment.   Of note, 4/8 she had a tick bite on her right neck.  Did receive prophylactic doxy dose.

## 2024-05-29 NOTE — REVIEW OF SYSTEMS
[Fever] : no fever [Chills] : chills [Itching] : Itching [Skin Rash] : skin rash [Negative] : Integumentary

## 2024-05-29 NOTE — PHYSICAL EXAM
[No Acute Distress] : no acute distress [Alert and Oriented x3] : oriented to person, place, and time [de-identified] : erythematous rash ~ 3 x 4 inch, midline around laminectomy scar, there is a 3mm scab in the center.

## 2024-05-30 ENCOUNTER — APPOINTMENT (OUTPATIENT)
Dept: NEUROSURGERY | Facility: CLINIC | Age: 69
End: 2024-05-30
Payer: MEDICARE

## 2024-05-30 VITALS
DIASTOLIC BLOOD PRESSURE: 88 MMHG | HEIGHT: 65 IN | BODY MASS INDEX: 25.83 KG/M2 | SYSTOLIC BLOOD PRESSURE: 148 MMHG | HEART RATE: 78 BPM | OXYGEN SATURATION: 98 % | WEIGHT: 155 LBS

## 2024-05-30 DIAGNOSIS — T81.49XA INFECTION FOLLOWING A PROCEDURE, OTHER SURGICAL SITE, INITIAL ENCOUNTER: ICD-10-CM

## 2024-05-30 PROCEDURE — 99215 OFFICE O/P EST HI 40 MIN: CPT

## 2024-05-30 NOTE — END OF VISIT
[FreeTextEntry3] : I have seen the patient and reviewed the case together with PA and I agree with the final recommendations and plan of care.  Sathya Muñoz MD Neurosurgery  [Time Spent: ___ minutes] : I have spent [unfilled] minutes of time on the encounter.

## 2024-05-30 NOTE — ASSESSMENT
[FreeTextEntry1] : Ms. Finn is a 65 yo F with a PMH of HTN, HLD, ex-smoker, paroxysmal SVT, Hx Pulmonary Fibrosis, PMR, Pulmonary nodule s/p VATS 2/2021-path adenocarcinoma in situ, GERD, Lumbar radiculopathy, s/p L3-L5 laminectomy, left L5-S1 microdiscectomy on 6/17/21.  S/p recent MBB with Dr. Rodríguez on March, April, early May and most recently 5/23/24.  Now presenting with enlarging area of erythema of low back since fir the past 3-4 weeks.  I recommend MRI Lspine without contrast as the patient is allergic to gadolinium, ID consult with Dr. Weeks, and follow up in one week for wound check.   The patient understands the plan of care and is in agreement.  All questions answered to patient satisfaction.

## 2024-05-30 NOTE — PHYSICAL EXAM
[General Appearance - Alert] : alert [General Appearance - In No Acute Distress] : in no acute distress [Clean] : clean [No Drainage] : without drainage [Erythema] : erythematous [Motor Strength] : muscle strength was normal in all four extremities [Sensation Tactile Decrease] : light touch was intact [FreeTextEntry1] : low back [FreeTextEntry6] : small crusting mid pole

## 2024-06-06 ENCOUNTER — APPOINTMENT (OUTPATIENT)
Dept: PAIN MANAGEMENT | Facility: CLINIC | Age: 69
End: 2024-06-06
Payer: COMMERCIAL

## 2024-06-06 VITALS
DIASTOLIC BLOOD PRESSURE: 80 MMHG | HEIGHT: 65 IN | BODY MASS INDEX: 25.83 KG/M2 | WEIGHT: 155 LBS | SYSTOLIC BLOOD PRESSURE: 122 MMHG

## 2024-06-06 DIAGNOSIS — M47.817 SPONDYLOSIS W/OUT MYELOPATHY OR RADICULOPATHY, LUMBOSACRAL REGION: ICD-10-CM

## 2024-06-06 DIAGNOSIS — M54.16 RADICULOPATHY, LUMBAR REGION: ICD-10-CM

## 2024-06-06 DIAGNOSIS — M54.12 RADICULOPATHY, CERVICAL REGION: ICD-10-CM

## 2024-06-06 PROCEDURE — G2211 COMPLEX E/M VISIT ADD ON: CPT | Mod: NC,1L

## 2024-06-06 PROCEDURE — 99214 OFFICE O/P EST MOD 30 MIN: CPT

## 2024-06-06 NOTE — PHYSICAL EXAM
[de-identified] : Constitutional: Well-developed, in no acute distress  Musculoskeletal: Lumbar Spine:   Gait: Antalgic 		Inspection: Normal curvature, no abnormal kyphosis or scoliosis 		Facet loading: pain bilaterally 		Palpation: 			Lumbar and paraspinal muscles: pain bilaterally 			Sacroiliac joint: no pain 			Greater trochanter: no pain 		Muscle Strength: 		Iliopsoas: 5/5 bilaterally 		Quadriceps: 5/5 bilaterally 		Hamstrings: 5/5 bilaterally 		Tibialis anterior: 5/5 bilaterally 		Extensor hallucis longus: 5/5 bilaterally  		Sensation: normal and equal in bilateral lower extremities  Extremity: no edema noted Neurological: Memory normal, AAO x 3, Cranial nerves II - XII grossly normal Psychiatric: Appropriate mood and affect, oriented to time, place, person, and situation
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
## 2024-06-11 NOTE — PATIENT PROFILE ADULT - DO YOU NEED ADDITIONAL SERVICES TO MANAGE ANY OF THESE MEDICAL CONDITIONS AT HOME?
Attempt 1/3: No answer  
Progress Note - Carey Brittle 37 y.o. male MRN: [id number]    Unit/Bed#: -01 Encounter: [id number]      Assessment:  Patient status post bilateral ureteroscopy with laser lithotripsy of stones and bilateral stent placement with Dr. Josiane Patel at [address] on 10/16. Postop pain. No intervention indicated. CT scan reviewed. Some residual fragments bilaterally in kidneys. Stents in position. Plan:  Patient scheduled with his urologist for stent removal and follow-up November 1. Subjective:   Patient status post bilateral ureteroscopy and laser lithotripsy of calculi and bilateral stent placement at [address] on 10/16. Patient experiencing postop stent related discomfort and came to [address] Low abdominal discomfort urgency frequency reviewed patient voiding no retention no fever    Objective:     Vitals: Blood pressure (!) 154/103, pulse (!) 123, temperature 98.4 °F (36.9 °C), temperature source Temporal, resp. rate 16, height 5' 7" (1.702 m), weight 87 kg (191 lb 12.8 oz), SpO2 95 %. ,Body mass index is 30.04 kg/m². Intake/Output Summary (Last 24 hours) at 10/30/2023 2052  Last data filed at 10/30/2023 1520  Gross per 24 hour   Intake 1100 ml   Output --   Net 1100 ml       Physical Exam:  Const: Appears healthy and well developed. No signs of acute distress present. Resp: Respirations are regular and unlabored. CV: Rate is regular. Rhythm is regular. Abdomen: Abdomen is soft, nontender, and nondistended. Kidneys are not palpable. : Abdomen soft nontender no flank discomfort. Psych: Patient's attitude is cooperative. Mood is normal. Affect is normal.    Invasive Devices       Peripheral Intravenous Line  Duration             Peripheral IV 10/30/23 Left;Ventral (anterior) Forearm <1 day                    Lab, Imaging and other studies: I have personally reviewed pertinent reports.
no

## 2024-06-14 ENCOUNTER — NON-APPOINTMENT (OUTPATIENT)
Age: 69
End: 2024-06-14

## 2024-07-11 ENCOUNTER — APPOINTMENT (OUTPATIENT)
Dept: OBGYN | Facility: CLINIC | Age: 69
End: 2024-07-11
Payer: MEDICARE

## 2024-07-11 VITALS
WEIGHT: 151 LBS | HEIGHT: 65 IN | SYSTOLIC BLOOD PRESSURE: 120 MMHG | DIASTOLIC BLOOD PRESSURE: 68 MMHG | BODY MASS INDEX: 25.16 KG/M2

## 2024-07-11 DIAGNOSIS — Z01.419 ENCOUNTER FOR GYNECOLOGICAL EXAMINATION (GENERAL) (ROUTINE) W/OUT ABNORMAL FINDINGS: ICD-10-CM

## 2024-07-11 PROCEDURE — G0101: CPT

## 2024-07-13 RX ORDER — FLUCONAZOLE 100 MG/1
100 TABLET ORAL
Qty: 10 | Refills: 0 | Status: COMPLETED | COMMUNITY
Start: 2024-06-04

## 2024-07-13 RX ORDER — ALBUTEROL SULFATE 90 UG/1
108 (90 BASE) INHALANT RESPIRATORY (INHALATION)
Qty: 8 | Refills: 0 | Status: ACTIVE | COMMUNITY
Start: 2024-01-24

## 2024-07-13 RX ORDER — PREDNISONE 5 MG/1
5 TABLET ORAL
Refills: 0 | Status: ACTIVE | COMMUNITY

## 2024-07-16 LAB
CYTOLOGY CVX/VAG DOC THIN PREP: ABNORMAL
HPV 16 E6+E7 MRNA CVX QL NAA+PROBE: NOT DETECTED
HPV HIGH+LOW RISK DNA PNL CVX: DETECTED
HPV18+45 E6+E7 MRNA CVX QL NAA+PROBE: NOT DETECTED

## 2024-07-25 ENCOUNTER — RESULT REVIEW (OUTPATIENT)
Age: 69
End: 2024-07-25

## 2024-07-25 ENCOUNTER — APPOINTMENT (OUTPATIENT)
Dept: GERIATRICS | Facility: CLINIC | Age: 69
End: 2024-07-25

## 2024-07-25 VITALS
DIASTOLIC BLOOD PRESSURE: 70 MMHG | BODY MASS INDEX: 25.33 KG/M2 | OXYGEN SATURATION: 98 % | HEART RATE: 83 BPM | SYSTOLIC BLOOD PRESSURE: 120 MMHG | HEIGHT: 65 IN | WEIGHT: 152 LBS

## 2024-07-25 DIAGNOSIS — G62.9 POLYNEUROPATHY, UNSPECIFIED: ICD-10-CM

## 2024-07-25 DIAGNOSIS — R30.0 DYSURIA: ICD-10-CM

## 2024-07-25 DIAGNOSIS — E61.1 IRON DEFICIENCY: ICD-10-CM

## 2024-07-25 DIAGNOSIS — E78.5 HYPERLIPIDEMIA, UNSPECIFIED: ICD-10-CM

## 2024-07-25 DIAGNOSIS — E53.8 DEFICIENCY OF OTHER SPECIFIED B GROUP VITAMINS: ICD-10-CM

## 2024-07-25 PROCEDURE — G0439: CPT

## 2024-07-25 NOTE — HISTORY OF PRESENT ILLNESS
[PMH Reviewed and Updated] : past medical history reviewed and updated [PSH Reviewed and Updated] : past surgical history reviewed and updated [Family History Reviewed and Updated] : family history reviewed and updated [Medication and Allergies Reconciled] : medication and allergies reconciled [Patient reported breast cancer screening was normal] : Patient reported breast cancer screening was normal [BreastSonogramDate] : 08/23 [Mammogramdate] : 07/21 [TextBox_19] : repeat 5 years [FreeTextEntry3] : 05/24 [FreeTextEntry5] : 07/24 [FreeTextEntry6] : neg, repeat 2 years

## 2024-07-28 NOTE — DISCUSSION/SUMMARY
[FreeTextEntry1] : All images and report reviewed by me in the office \par CT 7/7/2022 no change. \par \par Chest CT 5/21/2021+ RUL scarring + LLL micro nodules and mucus plugging\par \par CXR 3/2/2021 NO residual PTX R volume loss o/w NAD\par CXR  02/19/2021 - small apical pneumothorax with volume loss post thoracotomy, decreasing in size compared to 2/12/21.\par CT 11/21/19 mild subpleural fibrosis and stable RUL nodule\par PATH- RUL wedge 2- - Adenocarcinoma in situ nonmucinous.2 cm, p.Tis.  pN0\par \par PFTs 05/21/2021 Actual FEV1 1.93 (Pred 75%) FEV1/FVC- 61, %  % RV/% DLCO 58% \par \par PFT 4/20/2017 FEV1 2.77 (103% predicted) FEV1/FVC 74 no change postBD % % DLCO 81% consistent with mild hyperinflation and a borderline DLCO
Spontaneous

## 2024-08-01 ENCOUNTER — APPOINTMENT (OUTPATIENT)
Dept: PAIN MANAGEMENT | Facility: HOSPITAL | Age: 69
End: 2024-08-01

## 2024-08-02 ENCOUNTER — TRANSCRIPTION ENCOUNTER (OUTPATIENT)
Age: 69
End: 2024-08-02

## 2024-08-14 ENCOUNTER — OFFICE (OUTPATIENT)
Dept: URBAN - METROPOLITAN AREA CLINIC 122 | Facility: CLINIC | Age: 69
Setting detail: OPHTHALMOLOGY
End: 2024-08-14
Payer: MEDICARE

## 2024-08-14 DIAGNOSIS — H40.033: ICD-10-CM

## 2024-08-14 DIAGNOSIS — H52.4: ICD-10-CM

## 2024-08-14 DIAGNOSIS — G43.109: ICD-10-CM

## 2024-08-14 DIAGNOSIS — H25.13: ICD-10-CM

## 2024-08-14 PROBLEM — H52.7 REFRACTIVE ERROR: Status: ACTIVE | Noted: 2024-08-14

## 2024-08-14 PROCEDURE — 92020 GONIOSCOPY: CPT | Performed by: OPHTHALMOLOGY

## 2024-08-14 PROCEDURE — 92015 DETERMINE REFRACTIVE STATE: CPT | Performed by: OPHTHALMOLOGY

## 2024-08-14 PROCEDURE — 92004 COMPRE OPH EXAM NEW PT 1/>: CPT | Performed by: OPHTHALMOLOGY

## 2024-08-14 ASSESSMENT — CONFRONTATIONAL VISUAL FIELD TEST (CVF)
OD_FINDINGS: FULL
OS_FINDINGS: FULL

## 2024-08-19 ENCOUNTER — APPOINTMENT (OUTPATIENT)
Dept: PAIN MANAGEMENT | Facility: CLINIC | Age: 69
End: 2024-08-19
Payer: COMMERCIAL

## 2024-08-19 VITALS
HEART RATE: 69 BPM | OXYGEN SATURATION: 98 % | SYSTOLIC BLOOD PRESSURE: 128 MMHG | BODY MASS INDEX: 25.61 KG/M2 | WEIGHT: 150 LBS | DIASTOLIC BLOOD PRESSURE: 74 MMHG | HEIGHT: 64 IN

## 2024-08-19 DIAGNOSIS — G62.9 POLYNEUROPATHY, UNSPECIFIED: ICD-10-CM

## 2024-08-19 DIAGNOSIS — G89.4 CHRONIC PAIN SYNDROME: ICD-10-CM

## 2024-08-19 DIAGNOSIS — M54.16 RADICULOPATHY, LUMBAR REGION: ICD-10-CM

## 2024-08-19 PROCEDURE — G2211 COMPLEX E/M VISIT ADD ON: CPT | Mod: NC,1L

## 2024-08-19 PROCEDURE — 99214 OFFICE O/P EST MOD 30 MIN: CPT

## 2024-08-19 NOTE — PHYSICAL EXAM
[de-identified] : Constitutional: Well-developed, in no acute distress  Musculoskeletal: Lumbar Spine:   Gait: Antalgic 		Inspection: Normal curvature, no abnormal kyphosis or scoliosis 		Facet loading: pain bilaterally 		Palpation: 			Lumbar and paraspinal muscles: pain bilaterally 			Sacroiliac joint: no pain 			Greater trochanter: no pain 		Muscle Strength: 		Iliopsoas: 5/5 bilaterally 		Quadriceps: 5/5 bilaterally 		Hamstrings: 5/5 bilaterally 		Tibialis anterior: 5/5 bilaterally 		Extensor hallucis longus: 5/5 bilaterally  		Sensation: normal and equal in bilateral lower extremities  Extremity: no edema noted Neurological: Memory normal, AAO x 3, Cranial nerves II - XII grossly normal Psychiatric: Appropriate mood and affect, oriented to time, place, person, and situation
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
## 2024-10-22 ENCOUNTER — APPOINTMENT (OUTPATIENT)
Dept: HEART AND VASCULAR | Facility: CLINIC | Age: 69
End: 2024-10-22
Payer: MEDICARE

## 2024-10-22 ENCOUNTER — NON-APPOINTMENT (OUTPATIENT)
Age: 69
End: 2024-10-22

## 2024-10-22 VITALS
SYSTOLIC BLOOD PRESSURE: 120 MMHG | TEMPERATURE: 97.8 F | RESPIRATION RATE: 16 BRPM | HEIGHT: 64 IN | WEIGHT: 148 LBS | DIASTOLIC BLOOD PRESSURE: 78 MMHG | OXYGEN SATURATION: 99 % | HEART RATE: 68 BPM | BODY MASS INDEX: 25.27 KG/M2

## 2024-10-22 DIAGNOSIS — E78.5 HYPERLIPIDEMIA, UNSPECIFIED: ICD-10-CM

## 2024-10-22 DIAGNOSIS — R25.2 CRAMP AND SPASM: ICD-10-CM

## 2024-10-22 DIAGNOSIS — I47.10 SUPRAVENTRICULAR TACHYCARDIA, UNSPECIFIED: ICD-10-CM

## 2024-10-22 PROCEDURE — G2211 COMPLEX E/M VISIT ADD ON: CPT

## 2024-10-22 PROCEDURE — 99215 OFFICE O/P EST HI 40 MIN: CPT

## 2024-10-22 PROCEDURE — 93000 ELECTROCARDIOGRAM COMPLETE: CPT

## 2024-10-25 ENCOUNTER — RX RENEWAL (OUTPATIENT)
Age: 69
End: 2024-10-25

## 2024-10-29 ENCOUNTER — RESULT REVIEW (OUTPATIENT)
Age: 69
End: 2024-10-29

## 2024-12-09 ENCOUNTER — APPOINTMENT (OUTPATIENT)
Dept: DERMATOLOGY | Facility: CLINIC | Age: 69
End: 2024-12-09
Payer: MEDICARE

## 2024-12-09 VITALS — WEIGHT: 148 LBS | BODY MASS INDEX: 25.27 KG/M2 | HEIGHT: 64 IN

## 2024-12-09 DIAGNOSIS — L82.1 OTHER SEBORRHEIC KERATOSIS: ICD-10-CM

## 2024-12-09 DIAGNOSIS — L85.3 XEROSIS CUTIS: ICD-10-CM

## 2024-12-09 DIAGNOSIS — D22.9 MELANOCYTIC NEVI, UNSPECIFIED: ICD-10-CM

## 2024-12-09 PROCEDURE — 99203 OFFICE O/P NEW LOW 30 MIN: CPT

## 2024-12-09 RX ORDER — PREDNISONE 50 MG/1
TABLET ORAL
Refills: 0 | Status: ACTIVE | COMMUNITY

## 2025-01-21 ENCOUNTER — RESULT REVIEW (OUTPATIENT)
Age: 70
End: 2025-01-21

## 2025-03-19 ENCOUNTER — RESULT REVIEW (OUTPATIENT)
Age: 70
End: 2025-03-19

## 2025-04-16 ENCOUNTER — RESULT REVIEW (OUTPATIENT)
Age: 70
End: 2025-04-16

## 2025-04-29 ENCOUNTER — NON-APPOINTMENT (OUTPATIENT)
Age: 70
End: 2025-04-29

## 2025-04-29 ENCOUNTER — APPOINTMENT (OUTPATIENT)
Dept: HEART AND VASCULAR | Facility: CLINIC | Age: 70
End: 2025-04-29
Payer: MEDICARE

## 2025-04-29 VITALS
RESPIRATION RATE: 16 BRPM | WEIGHT: 158 LBS | DIASTOLIC BLOOD PRESSURE: 84 MMHG | OXYGEN SATURATION: 98 % | HEART RATE: 63 BPM | SYSTOLIC BLOOD PRESSURE: 130 MMHG | BODY MASS INDEX: 26.98 KG/M2 | HEIGHT: 64 IN

## 2025-04-29 DIAGNOSIS — R07.9 CHEST PAIN, UNSPECIFIED: ICD-10-CM

## 2025-04-29 DIAGNOSIS — I47.10 SUPRAVENTRICULAR TACHYCARDIA, UNSPECIFIED: ICD-10-CM

## 2025-04-29 DIAGNOSIS — R00.2 PALPITATIONS: ICD-10-CM

## 2025-04-29 PROCEDURE — 93242 EXT ECG>48HR<7D RECORDING: CPT

## 2025-04-29 PROCEDURE — G2211 COMPLEX E/M VISIT ADD ON: CPT

## 2025-04-29 PROCEDURE — 93244 EXT ECG>48HR<7D REV&INTERPJ: CPT

## 2025-04-29 PROCEDURE — 93000 ELECTROCARDIOGRAM COMPLETE: CPT | Mod: 59

## 2025-04-29 PROCEDURE — 99214 OFFICE O/P EST MOD 30 MIN: CPT

## 2025-04-29 RX ORDER — TOCILIZUMAB 20 MG/ML
INJECTION INTRAVENOUS
Refills: 0 | Status: ACTIVE | COMMUNITY

## 2025-04-29 RX ORDER — PREDNISONE 50 MG/1
TABLET ORAL
Refills: 0 | Status: ACTIVE | COMMUNITY

## 2025-05-14 ENCOUNTER — RESULT REVIEW (OUTPATIENT)
Age: 70
End: 2025-05-14

## 2025-05-16 ENCOUNTER — NON-APPOINTMENT (OUTPATIENT)
Age: 70
End: 2025-05-16

## 2025-05-20 ENCOUNTER — NON-APPOINTMENT (OUTPATIENT)
Age: 70
End: 2025-05-20
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
## 2025-05-29 ENCOUNTER — RESULT REVIEW (OUTPATIENT)
Age: 70
End: 2025-05-29

## 2025-06-05 ENCOUNTER — APPOINTMENT (OUTPATIENT)
Dept: HEART AND VASCULAR | Facility: CLINIC | Age: 70
End: 2025-06-05
Payer: MEDICARE

## 2025-06-05 VITALS
HEIGHT: 64 IN | WEIGHT: 162 LBS | HEART RATE: 62 BPM | BODY MASS INDEX: 27.66 KG/M2 | OXYGEN SATURATION: 98 % | DIASTOLIC BLOOD PRESSURE: 80 MMHG | SYSTOLIC BLOOD PRESSURE: 142 MMHG

## 2025-06-05 DIAGNOSIS — I47.10 SUPRAVENTRICULAR TACHYCARDIA, UNSPECIFIED: ICD-10-CM

## 2025-06-05 DIAGNOSIS — R07.9 CHEST PAIN, UNSPECIFIED: ICD-10-CM

## 2025-06-05 DIAGNOSIS — E78.5 HYPERLIPIDEMIA, UNSPECIFIED: ICD-10-CM

## 2025-06-05 PROCEDURE — 93351 STRESS TTE COMPLETE: CPT

## 2025-06-05 PROCEDURE — 93320 DOPPLER ECHO COMPLETE: CPT

## 2025-06-05 PROCEDURE — 99214 OFFICE O/P EST MOD 30 MIN: CPT

## 2025-06-05 PROCEDURE — G2211 COMPLEX E/M VISIT ADD ON: CPT

## 2025-06-05 PROCEDURE — 93325 DOPPLER ECHO COLOR FLOW MAPG: CPT

## 2025-06-09 NOTE — REVIEW OF SYSTEMS
Home Sleep Study.  Gave verbal instructions on how to use device and provided written instructions.  Advised pt to call 911 in the event of a medical emergency [facility].  Pt understood and stated they would return the device tomorrow by  0900   [Negative] : Heme/Lymph

## 2025-06-11 ENCOUNTER — RESULT REVIEW (OUTPATIENT)
Age: 70
End: 2025-06-11

## 2025-07-09 ENCOUNTER — RESULT REVIEW (OUTPATIENT)
Age: 70
End: 2025-07-09

## 2025-07-16 ENCOUNTER — APPOINTMENT (OUTPATIENT)
Dept: OBGYN | Facility: CLINIC | Age: 70
End: 2025-07-16
Payer: MEDICARE

## 2025-07-16 VITALS
WEIGHT: 159 LBS | SYSTOLIC BLOOD PRESSURE: 150 MMHG | DIASTOLIC BLOOD PRESSURE: 80 MMHG | HEIGHT: 64 IN | BODY MASS INDEX: 27.14 KG/M2

## 2025-07-16 PROBLEM — N90.89 VULVAR LESION: Status: ACTIVE | Noted: 2025-07-16

## 2025-07-16 PROBLEM — R87.810 CERVICAL HIGH RISK HPV (HUMAN PAPILLOMAVIRUS) TEST POSITIVE: Status: ACTIVE | Noted: 2025-07-16

## 2025-07-16 PROCEDURE — 99214 OFFICE O/P EST MOD 30 MIN: CPT

## 2025-07-16 RX ORDER — PREDNISONE 10 MG
TABLET ORAL
Refills: 0 | Status: ACTIVE | COMMUNITY

## 2025-07-16 RX ORDER — TOCILIZUMAB 20 MG/ML
INJECTION, SOLUTION, CONCENTRATE INTRAVENOUS
Refills: 0 | Status: ACTIVE | COMMUNITY

## 2025-07-23 DIAGNOSIS — E78.5 HYPERLIPIDEMIA, UNSPECIFIED: ICD-10-CM

## 2025-07-23 DIAGNOSIS — R00.2 PALPITATIONS: ICD-10-CM

## 2025-07-23 DIAGNOSIS — R79.82 ELEVATED C-REACTIVE PROTEIN (CRP): ICD-10-CM

## 2025-07-23 DIAGNOSIS — M79.89 OTHER SPECIFIED SOFT TISSUE DISORDERS: ICD-10-CM

## 2025-07-23 DIAGNOSIS — M54.2 CERVICALGIA: ICD-10-CM

## 2025-07-23 DIAGNOSIS — R25.2 CRAMP AND SPASM: ICD-10-CM

## 2025-07-23 DIAGNOSIS — M79.7 FIBROMYALGIA: ICD-10-CM

## 2025-07-23 DIAGNOSIS — E61.1 IRON DEFICIENCY: ICD-10-CM

## 2025-07-25 LAB
CYTOLOGY CVX/VAG DOC THIN PREP: ABNORMAL
HPV HIGH+LOW RISK DNA PNL CVX: NOT DETECTED

## 2025-08-06 ENCOUNTER — RESULT REVIEW (OUTPATIENT)
Age: 70
End: 2025-08-06

## 2025-08-28 ENCOUNTER — NON-APPOINTMENT (OUTPATIENT)
Age: 70
End: 2025-08-28

## 2025-09-04 ENCOUNTER — APPOINTMENT (OUTPATIENT)
Dept: GERIATRICS | Facility: CLINIC | Age: 70
End: 2025-09-04

## 2025-09-04 DIAGNOSIS — R31.9 HEMATURIA, UNSPECIFIED: ICD-10-CM

## 2025-09-05 LAB
APPEARANCE: CLEAR
BILIRUBIN URINE: NEGATIVE
BLOOD URINE: NEGATIVE
COLOR: YELLOW
GLUCOSE QUALITATIVE U: NEGATIVE MG/DL
KETONES URINE: NEGATIVE MG/DL
LEUKOCYTE ESTERASE URINE: NEGATIVE
NITRITE URINE: NEGATIVE
PH URINE: 6
PROTEIN URINE: NEGATIVE MG/DL
SPECIFIC GRAVITY URINE: 1.02
UROBILINOGEN URINE: 0.2 MG/DL

## 2025-09-08 ENCOUNTER — APPOINTMENT (OUTPATIENT)
Dept: ORTHOPEDIC SURGERY | Facility: CLINIC | Age: 70
End: 2025-09-08
Payer: MEDICARE

## 2025-09-08 VITALS
RESPIRATION RATE: 16 BRPM | SYSTOLIC BLOOD PRESSURE: 152 MMHG | DIASTOLIC BLOOD PRESSURE: 83 MMHG | WEIGHT: 163 LBS | OXYGEN SATURATION: 97 % | HEART RATE: 69 BPM | BODY MASS INDEX: 27.98 KG/M2

## 2025-09-08 DIAGNOSIS — M16.11 UNILATERAL PRIMARY OSTEOARTHRITIS, RIGHT HIP: ICD-10-CM

## 2025-09-08 PROCEDURE — 73502 X-RAY EXAM HIP UNI 2-3 VIEWS: CPT

## 2025-09-08 PROCEDURE — 99204 OFFICE O/P NEW MOD 45 MIN: CPT
